# Patient Record
Sex: FEMALE | Race: ASIAN | NOT HISPANIC OR LATINO | Employment: FULL TIME | URBAN - METROPOLITAN AREA
[De-identification: names, ages, dates, MRNs, and addresses within clinical notes are randomized per-mention and may not be internally consistent; named-entity substitution may affect disease eponyms.]

---

## 2024-01-19 ENCOUNTER — APPOINTMENT (OUTPATIENT)
Dept: PRIMARY CARE | Facility: CLINIC | Age: 54
End: 2024-01-19
Payer: COMMERCIAL

## 2024-01-25 ENCOUNTER — OFFICE VISIT (OUTPATIENT)
Dept: PRIMARY CARE | Facility: CLINIC | Age: 54
End: 2024-01-25
Payer: COMMERCIAL

## 2024-01-25 ENCOUNTER — LAB (OUTPATIENT)
Dept: LAB | Facility: LAB | Age: 54
End: 2024-01-25
Payer: COMMERCIAL

## 2024-01-25 VITALS
HEIGHT: 63 IN | RESPIRATION RATE: 18 BRPM | DIASTOLIC BLOOD PRESSURE: 84 MMHG | SYSTOLIC BLOOD PRESSURE: 126 MMHG | OXYGEN SATURATION: 98 % | WEIGHT: 187.6 LBS | HEART RATE: 85 BPM | TEMPERATURE: 97.2 F | BODY MASS INDEX: 33.24 KG/M2

## 2024-01-25 DIAGNOSIS — Z01.419 WELL WOMAN EXAM: ICD-10-CM

## 2024-01-25 DIAGNOSIS — E11.65 TYPE 2 DIABETES MELLITUS WITH HYPERGLYCEMIA, WITHOUT LONG-TERM CURRENT USE OF INSULIN (MULTI): Primary | ICD-10-CM

## 2024-01-25 DIAGNOSIS — Z00.00 ANNUAL PHYSICAL EXAM: ICD-10-CM

## 2024-01-25 DIAGNOSIS — M1A.9XX0 CHRONIC GOUT INVOLVING TOE OF RIGHT FOOT WITHOUT TOPHUS, UNSPECIFIED CAUSE: ICD-10-CM

## 2024-01-25 DIAGNOSIS — Z76.89 ESTABLISHING CARE WITH NEW DOCTOR, ENCOUNTER FOR: Primary | ICD-10-CM

## 2024-01-25 DIAGNOSIS — Z12.12 SCREENING FOR COLORECTAL CANCER: ICD-10-CM

## 2024-01-25 DIAGNOSIS — E66.9 CLASS 1 OBESITY WITH BODY MASS INDEX (BMI) OF 33.0 TO 33.9 IN ADULT, UNSPECIFIED OBESITY TYPE, UNSPECIFIED WHETHER SERIOUS COMORBIDITY PRESENT: ICD-10-CM

## 2024-01-25 DIAGNOSIS — Z12.31 BREAST CANCER SCREENING BY MAMMOGRAM: ICD-10-CM

## 2024-01-25 DIAGNOSIS — Z12.11 SCREENING FOR COLORECTAL CANCER: ICD-10-CM

## 2024-01-25 DIAGNOSIS — N39.3 STRESS INCONTINENCE: ICD-10-CM

## 2024-01-25 DIAGNOSIS — E55.9 VITAMIN D DEFICIENCY: ICD-10-CM

## 2024-01-25 DIAGNOSIS — Z23 NEED FOR SHINGLES VACCINE: ICD-10-CM

## 2024-01-25 PROBLEM — J06.9 VIRAL UPPER RESPIRATORY INFECTION: Status: ACTIVE | Noted: 2024-01-25

## 2024-01-25 PROBLEM — J02.9 SORE THROAT: Status: RESOLVED | Noted: 2024-01-25 | Resolved: 2024-01-25

## 2024-01-25 PROBLEM — J06.9 VIRAL UPPER RESPIRATORY INFECTION: Status: RESOLVED | Noted: 2024-01-25 | Resolved: 2024-01-25

## 2024-01-25 PROBLEM — R05.9 COUGH: Status: ACTIVE | Noted: 2024-01-25

## 2024-01-25 PROBLEM — J02.9 SORE THROAT: Status: ACTIVE | Noted: 2024-01-25

## 2024-01-25 PROBLEM — R50.9 FEVER: Status: ACTIVE | Noted: 2024-01-25

## 2024-01-25 PROBLEM — J20.9 ACUTE BRONCHITIS: Status: RESOLVED | Noted: 2024-01-25 | Resolved: 2024-01-25

## 2024-01-25 PROBLEM — R51.9 HEAD ACHE: Status: ACTIVE | Noted: 2024-01-25

## 2024-01-25 PROBLEM — D69.6 THROMBOCYTOPENIA (CMS-HCC): Status: ACTIVE | Noted: 2024-01-25

## 2024-01-25 PROBLEM — J20.9 ACUTE BRONCHITIS: Status: ACTIVE | Noted: 2024-01-25

## 2024-01-25 PROBLEM — R03.0 ELEVATED BP WITHOUT DIAGNOSIS OF HYPERTENSION: Status: ACTIVE | Noted: 2024-01-25

## 2024-01-25 PROBLEM — R68.89: Status: ACTIVE | Noted: 2024-01-25

## 2024-01-25 PROBLEM — R03.0 ELEVATED BP WITHOUT DIAGNOSIS OF HYPERTENSION: Status: RESOLVED | Noted: 2024-01-25 | Resolved: 2024-01-25

## 2024-01-25 PROBLEM — R63.2 POLYPHAGIA: Status: ACTIVE | Noted: 2024-01-25

## 2024-01-25 PROBLEM — R05.9 COUGH: Status: RESOLVED | Noted: 2024-01-25 | Resolved: 2024-01-25

## 2024-01-25 LAB
25(OH)D3 SERPL-MCNC: 10 NG/ML (ref 30–100)
ALBUMIN SERPL BCP-MCNC: 4.5 G/DL (ref 3.4–5)
ALP SERPL-CCNC: 69 U/L (ref 33–110)
ALT SERPL W P-5'-P-CCNC: 26 U/L (ref 7–45)
ANION GAP SERPL CALC-SCNC: 17 MMOL/L (ref 10–20)
AST SERPL W P-5'-P-CCNC: 24 U/L (ref 9–39)
BASOPHILS # BLD AUTO: 0.04 X10*3/UL (ref 0–0.1)
BASOPHILS NFR BLD AUTO: 0.8 %
BILIRUB SERPL-MCNC: 0.6 MG/DL (ref 0–1.2)
BUN SERPL-MCNC: 10 MG/DL (ref 6–23)
CALCIUM SERPL-MCNC: 10 MG/DL (ref 8.6–10.6)
CHLORIDE SERPL-SCNC: 100 MMOL/L (ref 98–107)
CHOLEST SERPL-MCNC: 239 MG/DL (ref 0–199)
CHOLESTEROL/HDL RATIO: 5.5
CO2 SERPL-SCNC: 26 MMOL/L (ref 21–32)
CREAT SERPL-MCNC: 0.94 MG/DL (ref 0.5–1.05)
EGFRCR SERPLBLD CKD-EPI 2021: 72 ML/MIN/1.73M*2
EOSINOPHIL # BLD AUTO: 0.05 X10*3/UL (ref 0–0.7)
EOSINOPHIL NFR BLD AUTO: 1 %
ERYTHROCYTE [DISTWIDTH] IN BLOOD BY AUTOMATED COUNT: 12 % (ref 11.5–14.5)
EST. AVERAGE GLUCOSE BLD GHB EST-MCNC: 346 MG/DL
GLUCOSE SERPL-MCNC: 312 MG/DL (ref 74–99)
HBA1C MFR BLD: 13.7 %
HCT VFR BLD AUTO: 49.4 % (ref 36–46)
HDLC SERPL-MCNC: 43.6 MG/DL
HGB BLD-MCNC: 16.2 G/DL (ref 12–16)
IMM GRANULOCYTES # BLD AUTO: 0.02 X10*3/UL (ref 0–0.7)
IMM GRANULOCYTES NFR BLD AUTO: 0.4 % (ref 0–0.9)
LDLC SERPL CALC-MCNC: 129 MG/DL
LYMPHOCYTES # BLD AUTO: 1.12 X10*3/UL (ref 1.2–4.8)
LYMPHOCYTES NFR BLD AUTO: 22 %
MCH RBC QN AUTO: 27.6 PG (ref 26–34)
MCHC RBC AUTO-ENTMCNC: 32.8 G/DL (ref 32–36)
MCV RBC AUTO: 84 FL (ref 80–100)
MONOCYTES # BLD AUTO: 0.27 X10*3/UL (ref 0.1–1)
MONOCYTES NFR BLD AUTO: 5.3 %
NEUTROPHILS # BLD AUTO: 3.58 X10*3/UL (ref 1.2–7.7)
NEUTROPHILS NFR BLD AUTO: 70.5 %
NON HDL CHOLESTEROL: 195 MG/DL (ref 0–149)
NRBC BLD-RTO: 0 /100 WBCS (ref 0–0)
PLATELET # BLD AUTO: 242 X10*3/UL (ref 150–450)
POTASSIUM SERPL-SCNC: 4.6 MMOL/L (ref 3.5–5.3)
PROT SERPL-MCNC: 7.7 G/DL (ref 6.4–8.2)
RBC # BLD AUTO: 5.87 X10*6/UL (ref 4–5.2)
SODIUM SERPL-SCNC: 138 MMOL/L (ref 136–145)
TRIGL SERPL-MCNC: 332 MG/DL (ref 0–149)
TSH SERPL-ACNC: 2.32 MIU/L (ref 0.44–3.98)
VLDL: 66 MG/DL (ref 0–40)
WBC # BLD AUTO: 5.1 X10*3/UL (ref 4.4–11.3)

## 2024-01-25 PROCEDURE — 36415 COLL VENOUS BLD VENIPUNCTURE: CPT

## 2024-01-25 PROCEDURE — 82306 VITAMIN D 25 HYDROXY: CPT

## 2024-01-25 PROCEDURE — 99386 PREV VISIT NEW AGE 40-64: CPT | Performed by: STUDENT IN AN ORGANIZED HEALTH CARE EDUCATION/TRAINING PROGRAM

## 2024-01-25 PROCEDURE — 80061 LIPID PANEL: CPT

## 2024-01-25 PROCEDURE — 80053 COMPREHEN METABOLIC PANEL: CPT

## 2024-01-25 PROCEDURE — 3008F BODY MASS INDEX DOCD: CPT | Performed by: STUDENT IN AN ORGANIZED HEALTH CARE EDUCATION/TRAINING PROGRAM

## 2024-01-25 PROCEDURE — 84443 ASSAY THYROID STIM HORMONE: CPT

## 2024-01-25 PROCEDURE — 85025 COMPLETE CBC W/AUTO DIFF WBC: CPT

## 2024-01-25 PROCEDURE — 90750 HZV VACC RECOMBINANT IM: CPT | Performed by: STUDENT IN AN ORGANIZED HEALTH CARE EDUCATION/TRAINING PROGRAM

## 2024-01-25 PROCEDURE — 1036F TOBACCO NON-USER: CPT | Performed by: STUDENT IN AN ORGANIZED HEALTH CARE EDUCATION/TRAINING PROGRAM

## 2024-01-25 PROCEDURE — 90471 IMMUNIZATION ADMIN: CPT | Performed by: STUDENT IN AN ORGANIZED HEALTH CARE EDUCATION/TRAINING PROGRAM

## 2024-01-25 PROCEDURE — 83036 HEMOGLOBIN GLYCOSYLATED A1C: CPT

## 2024-01-25 RX ORDER — METFORMIN HYDROCHLORIDE 500 MG/1
1000 TABLET, EXTENDED RELEASE ORAL
Qty: 60 TABLET | Refills: 11 | Status: SHIPPED | OUTPATIENT
Start: 2024-01-25 | End: 2024-05-02 | Stop reason: DRUGHIGH

## 2024-01-25 RX ORDER — ERGOCALCIFEROL 1.25 MG/1
50000 CAPSULE ORAL
Qty: 4 CAPSULE | Refills: 1 | Status: SHIPPED | OUTPATIENT
Start: 2024-01-25 | End: 2024-03-21

## 2024-01-25 ASSESSMENT — LIFESTYLE VARIABLES
AUDIT-C TOTAL SCORE: 1
HOW MANY STANDARD DRINKS CONTAINING ALCOHOL DO YOU HAVE ON A TYPICAL DAY: 1 OR 2
SKIP TO QUESTIONS 9-10: 1
HOW OFTEN DO YOU HAVE A DRINK CONTAINING ALCOHOL: MONTHLY OR LESS
HOW OFTEN DO YOU HAVE SIX OR MORE DRINKS ON ONE OCCASION: NEVER

## 2024-01-25 ASSESSMENT — PATIENT HEALTH QUESTIONNAIRE - PHQ9
2. FEELING DOWN, DEPRESSED OR HOPELESS: SEVERAL DAYS
1. LITTLE INTEREST OR PLEASURE IN DOING THINGS: NOT AT ALL
10. IF YOU CHECKED OFF ANY PROBLEMS, HOW DIFFICULT HAVE THESE PROBLEMS MADE IT FOR YOU TO DO YOUR WORK, TAKE CARE OF THINGS AT HOME, OR GET ALONG WITH OTHER PEOPLE: SOMEWHAT DIFFICULT
SUM OF ALL RESPONSES TO PHQ9 QUESTIONS 1 & 2: 1

## 2024-01-25 NOTE — PATIENT INSTRUCTIONS
Due for shingles vaccine   Colonoscopy and mammogram ordered.   Blood work before your next visit.  If you receive medical information from My Chart, your results will be released into your online chart. This means you may view or see results before someone from our office contact you directly.  Please keep in mind that if blood work or imaging were ordered during your visit, all the nonurgent lab results will be discussed with you at your next office visit.  Please arrive 15 minutes before your appointment.     Please return to our nurse clinic in 2-6 months for 2nd dose of shingles vaccine   Return to office in 12 months for annual physical exam or as needed

## 2024-01-25 NOTE — PROGRESS NOTES
Subjective   Patient ID: Iva Pollock is a pleasant 53 y.o. female w hx of gout  who presents for New Patient Visit.  HPI    Health Maintenance:  -   Colonoscopy: ordered   -   Mammogram: ordered   -   PAP: due   -   Bone density DEXA: NA     Immunizations:  - COVID vaccination status: vaccinated   - Influenza: declined   - Shingles: recommended , 1st dose today.   - TDAP: recommended every 10 years   - Pneumo Vaccine: at 65    She reports she has occasional loss of bladder control with coughing and sneezing.  Denies any pelvic pain or pressure.  Denies any history of pregnancy or pelvic surgery.  Does not have hematuria, pyuria or dysuria.    Review of Systems   All other systems reviewed and are negative.      Visit Vitals  /84 (BP Location: Right arm, Patient Position: Sitting, BP Cuff Size: Adult)   Pulse 85   Temp 36.2 °C (97.2 °F)   Resp 18          Objective   Physical Exam  Constitutional:       General: She is not in acute distress.     Appearance: Normal appearance.   HENT:      Head: Normocephalic and atraumatic.   Eyes:      General: No scleral icterus.     Conjunctiva/sclera: Conjunctivae normal.   Cardiovascular:      Rate and Rhythm: Normal rate and regular rhythm.      Heart sounds: Normal heart sounds.   Pulmonary:      Effort: Pulmonary effort is normal.      Breath sounds: Normal breath sounds. No wheezing.   Abdominal:      General: Bowel sounds are normal. There is no distension.      Palpations: Abdomen is soft.      Tenderness: There is no abdominal tenderness.   Musculoskeletal:      Cervical back: Neck supple.      Right lower leg: No edema.      Left lower leg: No edema.   Lymphadenopathy:      Cervical: No cervical adenopathy.   Skin:     General: Skin is warm and dry.   Neurological:      General: No focal deficit present.      Mental Status: She is alert and oriented to person, place, and time.   Psychiatric:         Mood and Affect: Mood normal.         Behavior: Behavior  normal.         Assessment/Plan   Problem List Items Addressed This Visit    None  Visit Diagnoses       Establishing care with new doctor, encounter for    -  Primary    Annual physical exam        Class 1 obesity with body mass index (BMI) of 33.0 to 33.9 in adult, unspecified obesity type, unspecified whether serious comorbidity present        Relevant Orders    Comprehensive Metabolic Panel    CBC and Auto Differential    Vitamin D 25-Hydroxy,Total (for eval of Vitamin D levels)    Hemoglobin A1C    Lipid Panel    TSH with reflex to Free T4 if abnormal    Breast cancer screening by mammogram        Relevant Orders    BI mammo bilateral screening tomosynthesis    Screening for colorectal cancer        Relevant Orders    Colonoscopy Screening; Average Risk Patient    Chronic gout involving toe of right foot without tophus, unspecified cause        Well woman exam        Relevant Orders    Referral to Obstetrics / Gynecology    Need for shingles vaccine        Relevant Orders    Zoster vaccine, recombinant, adult (SHINGRIX) (Completed)    Stress incontinence        Relevant Orders    Referral to Physical Therapy

## 2024-02-08 ENCOUNTER — TELEMEDICINE (OUTPATIENT)
Dept: PHARMACY | Facility: HOSPITAL | Age: 54
End: 2024-02-08
Payer: COMMERCIAL

## 2024-02-08 DIAGNOSIS — E11.65 TYPE 2 DIABETES MELLITUS WITH HYPERGLYCEMIA, WITHOUT LONG-TERM CURRENT USE OF INSULIN (MULTI): ICD-10-CM

## 2024-02-08 RX ORDER — BLOOD SUGAR DIAGNOSTIC
STRIP MISCELLANEOUS
Qty: 100 STRIP | Refills: 3 | Status: SHIPPED | OUTPATIENT
Start: 2024-02-08 | End: 2024-05-02 | Stop reason: DRUGHIGH

## 2024-02-08 RX ORDER — DEXTROSE 4 G
TABLET,CHEWABLE ORAL
Qty: 1 EACH | Refills: 0 | Status: SHIPPED | OUTPATIENT
Start: 2024-02-08

## 2024-02-08 RX ORDER — LANCETS
EACH MISCELLANEOUS
Qty: 100 EACH | Refills: 3 | Status: SHIPPED | OUTPATIENT
Start: 2024-02-08 | End: 2024-05-02 | Stop reason: DRUGHIGH

## 2024-02-08 NOTE — PROGRESS NOTES
Subjective     Patient ID: Iva Pollock is a 53 y.o. female who presents for Diabetes.    Referring Provider: Rachael Avery MD     Diabetes  She presents for her initial diabetic visit. She has type 2 diabetes mellitus. Current diabetic treatment includes oral agent (monotherapy). She is compliant with treatment all of the time.       Allergies   Allergen Reactions    Sulfamethoxazole-Trimethoprim Swelling and Rash       Objective     Current DM Pharmacotherapy:   Metformin  mg; take 2 tablets by mouth daily with breakfast    SECONDARY PREVENTION  - Statin? No  - ACE-I/ARB? No  - Aspirin? No    Current monitoring regimen:   Patient is using:  patient is newly diagnosed and so she does not have a glucometer at this time.    Pertinent PMH Review:  - PMH of Pancreatitis: No  - PMH/FH of Medullary Thyroid Cancer: No  - PMH of Retinopathy: No  - PMH of Urinary Tract Infections: No    Lab Review  Lab Results   Component Value Date    BILITOT 0.6 01/25/2024    CALCIUM 10.0 01/25/2024    CO2 26 01/25/2024     01/25/2024    CREATININE 0.94 01/25/2024    GLUCOSE 312 (H) 01/25/2024    ALKPHOS 69 01/25/2024    K 4.6 01/25/2024    PROT 7.7 01/25/2024     01/25/2024    AST 24 01/25/2024    ALT 26 01/25/2024    BUN 10 01/25/2024    ANIONGAP 17 01/25/2024    ALBUMIN 4.5 01/25/2024     Lab Results   Component Value Date    TRIG 332 (H) 01/25/2024    CHOL 239 (H) 01/25/2024    LDLCALC 129 (H) 01/25/2024    HDL 43.6 01/25/2024     Lab Results   Component Value Date    HGBA1C 13.7 (H) 01/25/2024     The 10-year ASCVD risk score (Iron NIEVES, et al., 2019) is: 5.1%    Values used to calculate the score:      Age: 53 years      Sex: Female      Is Non- : No      Diabetic: Yes      Tobacco smoker: No      Systolic Blood Pressure: 126 mmHg      Is BP treated: No      HDL Cholesterol: 43.6 mg/dL      Total Cholesterol: 239 mg/dL      Assessment/Plan     Problem List Items Addressed This Visit        Type 2 diabetes mellitus with hyperglycemia, without long-term current use of insulin (CMS/[facility])     Patient's diabetes is not currently at goal. Her most recent A1c from 1/25/2024 was 13.7% which is above the goal A1c of <7%. Patient is newly diagnosed with diabetes and does not have testing supplies at this time. Given how high her A1c was on 1/25/2024, it is good for patient to see where BG is daily so will recommend her to test once a day. May also be able to recommend a GLP 1-RA or SGLT2i to help further reduce her BG based on insurance coverage and may be able to apply for PAP.    PLAN:  Continue  Metformin  mg; take 2 tablets by mouth daily with breakfast  Begin testing blood sugar daily before breakfast.         Relevant Medications    blood-glucose meter (Accu-Chek Guide Glucose Meter) misc    blood sugar diagnostic (Accu-Chek Guide test strips) strip    lancets (Accu-Chek Fastclix Lancet Drum) misc    Other Relevant Orders    Follow Up In [facility]       Type 2 diabetes mellitus, is not at goal. Goal A1C: <7%    Follow up: I recommend diabetes care be 2 weeks.    Emmie Falcon, Pharm. D.  PGY-1 Pharmacy Resident    Continue all meds under the continuation of care with the referring provider and clinical pharmacy team

## 2024-02-08 NOTE — ASSESSMENT & PLAN NOTE
Patient's diabetes is not currently at goal. Her most recent A1c from 1/25/2024 was 13.7% which is above the goal A1c of <7%. Patient is newly diagnosed with diabetes and does not have testing supplies at this time. Given how high her A1c was on 1/25/2024, it is good for patient to see where BG is daily so will recommend her to test once a day. May also be able to recommend a GLP 1-RA or SGLT2i to help further reduce her BG based on insurance coverage and may be able to apply for PAP.    PLAN:  Continue  Metformin  mg; take 2 tablets by mouth daily with breakfast  Begin testing blood sugar daily before breakfast.

## 2024-02-22 ENCOUNTER — TELEMEDICINE (OUTPATIENT)
Dept: PHARMACY | Facility: HOSPITAL | Age: 54
End: 2024-02-22
Payer: COMMERCIAL

## 2024-02-22 DIAGNOSIS — E11.65 TYPE 2 DIABETES MELLITUS WITH HYPERGLYCEMIA, WITHOUT LONG-TERM CURRENT USE OF INSULIN (MULTI): ICD-10-CM

## 2024-02-22 NOTE — PROGRESS NOTES
Subjective     Patient ID: Iva Pollock is a 53 y.o. female who presents for Diabetes.    Referring Provider: Rachael Avery MD     Diabetes  She presents for her follow-up diabetic visit. She has type 2 diabetes mellitus. Her disease course has been improving. There are no hypoglycemic associated symptoms. There are no hypoglycemic complications. There are no diabetic complications. Current diabetic treatment includes oral agent (monotherapy). She is following a generally healthy diet. She participates in exercise three times a week. Her breakfast blood glucose range is generally 130-140 mg/dl.       Allergies   Allergen Reactions    Sulfamethoxazole-Trimethoprim Swelling and Rash       Objective     Current DM Pharmacotherapy:   Metformin XR 500mg; take 2 tablets by mouth daily with breakfast    SECONDARY PREVENTION  - Statin? No  - ACE-I/ARB? No  - Aspirin? No    Current monitoring regimen:   Patient is using: glucometer    Testing frequency: daily in the mornings before breakfast    SMBG Readings: typically in the range of 130-145 mg/dL, today was 122 mg/dL    Any episodes of hypoglycemia? No  Hypoglycemia awareness? Yes      Pertinent PMH Review:  - PMH of Pancreatitis: No  - PMH/FH of Medullary Thyroid Cancer: No  - PMH of Retinopathy: No  - PMH of Urinary Tract Infections: No    Lab Review  Lab Results   Component Value Date    BILITOT 0.6 01/25/2024    CALCIUM 10.0 01/25/2024    CO2 26 01/25/2024     01/25/2024    CREATININE 0.94 01/25/2024    GLUCOSE 312 (H) 01/25/2024    ALKPHOS 69 01/25/2024    K 4.6 01/25/2024    PROT 7.7 01/25/2024     01/25/2024    AST 24 01/25/2024    ALT 26 01/25/2024    BUN 10 01/25/2024    ANIONGAP 17 01/25/2024    ALBUMIN 4.5 01/25/2024     Lab Results   Component Value Date    TRIG 332 (H) 01/25/2024    CHOL 239 (H) 01/25/2024    LDLCALC 129 (H) 01/25/2024    HDL 43.6 01/25/2024     Lab Results   Component Value Date    HGBA1C 13.7 (H) 01/25/2024     The 10-year  ASCVD risk score (Iron NIEVES, et al., 2019) is: 5.1%    Values used to calculate the score:      Age: 53 years      Sex: Female      Is Non- : No      Diabetic: Yes      Tobacco smoker: No      Systolic Blood Pressure: 126 mmHg      Is BP treated: No      HDL Cholesterol: 43.6 mg/dL      Total Cholesterol: 239 mg/dL      Assessment/Plan     Problem List Items Addressed This Visit       Type 2 diabetes mellitus with hyperglycemia, without long-term current use of insulin (CMS/[facility])     Patient's diabetes is not currently at goal. Her most recent A1c from 1/25/2024 was 13.7% which is above the goal A1c of <7%. Patient has been testing her BG once a day in the mornings before she eats. Typically her BG ranges from 130-145 mg/dL in the mornings. Will see what insurance covers for an SGLT2i or GLP-1RA to see what option would be best for patient. Patient does not want to be on a lot of medications if possible, counseled patient on the other benefits of these classes of medications including cardioprotection, renal protection, and weight loss (for GLP-1s only). Patient understood and wants to see what the insurance will cover.    PLAN  Continue  Metformin  mg; take 2 tablets by mouth daily with breakfast            Type 2 diabetes mellitus, is not at goal. Goal A1C: <7%    Follow up: I recommend diabetes care be 3 weeks.    Emmie Falcon, Pharm. D.  PGY-1 Pharmacy Resident     Continue all meds under the continuation of care with the referring provider and clinical pharmacy team

## 2024-02-22 NOTE — ASSESSMENT & PLAN NOTE
Patient's diabetes is not currently at goal. Her most recent A1c from 1/25/2024 was 13.7% which is above the goal A1c of <7%. Patient has been testing her BG once a day in the mornings before she eats. Typically her BG ranges from 130-145 mg/dL in the mornings. Will see what insurance covers for an SGLT2i or GLP-1RA to see what option would be best for patient. Patient does not want to be on a lot of medications if possible, counseled patient on the other benefits of these classes of medications including cardioprotection, renal protection, and weight loss (for GLP-1s only). Patient understood and wants to see what the insurance will cover.    PLAN  Continue  Metformin  mg; take 2 tablets by mouth daily with breakfast

## 2024-03-14 ENCOUNTER — TELEMEDICINE (OUTPATIENT)
Dept: PHARMACY | Facility: HOSPITAL | Age: 54
End: 2024-03-14
Payer: COMMERCIAL

## 2024-03-14 DIAGNOSIS — E11.65 TYPE 2 DIABETES MELLITUS WITH HYPERGLYCEMIA, WITHOUT LONG-TERM CURRENT USE OF INSULIN (MULTI): ICD-10-CM

## 2024-03-14 NOTE — ASSESSMENT & PLAN NOTE
Patient's diabetes is not currently at goal. Her most recent A1c from 1/25/2024 was 13.7% which is above the goal A1c of <7%. Patient has been continuing with the lifestyle changes and is eating a good balanced diet that includes a lot of healthy fruits and vegetables and does exercise several times a week.   Counseled patient about the beneficial effects of the SGLT2 and GLP-1 RA classes including the cardioprotective and renal protective effects in addition to the glucose lowering effects. A test claim shows  Farxiga 5 mg was $40 copay, Ozempic would need a PA. Patient states that she does not want to be on a lot of medications if she can help it and wants to think more about starting a new medication since her FBG at this time is under the goal of <130 mg/dL. Counseled patient on the adverse outcomes that are more likely with uncontrolled BG and since her A1c in January was so elevated, it is important to adequately treat her diabetes so she is not at risk for these conditions.    PLAN:  Continue  Metformin  mg; take 2 tablets by mouth daily with breakfast

## 2024-03-14 NOTE — PROGRESS NOTES
Subjective     Patient ID: Iva Pollock is a 53 y.o. female who presents for Diabetes.    Referring Provider: aRchael Avery MD     Diabetes  She presents for her follow-up diabetic visit. She has type 2 diabetes mellitus. Her breakfast blood glucose range is generally 110-130 mg/dl.       Diet: Patient is eating a lot of healthy fruits and vegetables and avoiding more carbohydrates     Exercise: Patient exercises several days a week    Allergies   Allergen Reactions    Sulfamethoxazole-Trimethoprim Swelling and Rash       Objective     Current DM Pharmacotherapy:   Metformin  mg; take 2 tablets by mouth daily with breakfast    SECONDARY PREVENTION  - Statin? No  - ACE-I/ARB? No  - Aspirin? No    Current monitoring regimen:   Patient is using: glucometer    Testing frequency: daily in the mornings before breakfast    SMBG Readings: Patient reports that her FBG has been running around the 120's in mornings     Any episodes of hypoglycemia? No  Hypoglycemia awareness? Yes      Pertinent PMH Review:  - PMH of Pancreatitis: No  - PMH/FH of Medullary Thyroid Cancer: No  - PMH/FH of Multiple Endocrine Neoplasia (MEN) Type II: No  - PMH of Retinopathy: No  - PMH of Urinary Tract Infections: No    Lab Review  Lab Results   Component Value Date    BILITOT 0.6 01/25/2024    CALCIUM 10.0 01/25/2024    CO2 26 01/25/2024     01/25/2024    CREATININE 0.94 01/25/2024    GLUCOSE 312 (H) 01/25/2024    ALKPHOS 69 01/25/2024    K 4.6 01/25/2024    PROT 7.7 01/25/2024     01/25/2024    AST 24 01/25/2024    ALT 26 01/25/2024    BUN 10 01/25/2024    ANIONGAP 17 01/25/2024    ALBUMIN 4.5 01/25/2024     Lab Results   Component Value Date    TRIG 332 (H) 01/25/2024    CHOL 239 (H) 01/25/2024    LDLCALC 129 (H) 01/25/2024    HDL 43.6 01/25/2024     Lab Results   Component Value Date    HGBA1C 13.7 (H) 01/25/2024     The 10-year ASCVD risk score (Iron NIEVES, et al., 2019) is: 5.1%    Values used to calculate the  score:      Age: 53 years      Sex: Female      Is Non- : No      Diabetic: Yes      Tobacco smoker: No      Systolic Blood Pressure: 126 mmHg      Is BP treated: No      HDL Cholesterol: 43.6 mg/dL      Total Cholesterol: 239 mg/dL      Assessment/Plan     Problem List Items Addressed This Visit       Type 2 diabetes mellitus with hyperglycemia, without long-term current use of insulin (CMS/[facility])     Patient's diabetes is not currently at goal. Her most recent A1c from 1/25/2024 was 13.7% which is above the goal A1c of <7%. Patient has been continuing with the lifestyle changes and is eating a good balanced diet that includes a lot of healthy fruits and vegetables and does exercise several times a week.   Counseled patient about the beneficial effects of the SGLT2 and GLP-1 RA classes including the cardioprotective and renal protective effects in addition to the glucose lowering effects. A test claim shows  Farxiga 5 mg was $40 copay, Ozempic would need a PA. Patient states that she does not want to be on a lot of medications if she can help it and wants to think more about starting a new medication since her FBG at this time is under the goal of <130 mg/dL. Counseled patient on the adverse outcomes that are more likely with uncontrolled BG and since her A1c in January was so elevated, it is important to adequately treat her diabetes so she is not at risk for these conditions.    PLAN:  Continue  Metformin  mg; take 2 tablets by mouth daily with breakfast            Type 2 diabetes mellitus, is not at goal. Goal A1C: <7%    Follow up: I recommend diabetes care be 3 weeks.    Emmie Falcon, Pharm. D.  PGY-1 Pharmacy Resident     Continue all meds under the continuation of care with the referring provider and clinical pharmacy team

## 2024-04-04 ENCOUNTER — TELEMEDICINE (OUTPATIENT)
Dept: PHARMACY | Facility: HOSPITAL | Age: 54
End: 2024-04-04
Payer: COMMERCIAL

## 2024-04-04 DIAGNOSIS — E11.65 TYPE 2 DIABETES MELLITUS WITH HYPERGLYCEMIA, WITHOUT LONG-TERM CURRENT USE OF INSULIN (MULTI): ICD-10-CM

## 2024-04-04 NOTE — PROGRESS NOTES
Subjective     Patient ID: Iva Pollock is a 53 y.o. female who presents for Diabetes.    Referring Provider: Rachael Avery MD     Diabetes  She presents for her follow-up diabetic visit. She has type 2 diabetes mellitus. Her disease course has been improving. Current diabetic treatment includes oral agent (monotherapy). She is compliant with treatment all of the time. She is following a generally healthy diet. She participates in exercise every other day. Her breakfast blood glucose range is generally 110-130 mg/dl. Her dinner blood glucose range is generally 140-180 mg/dl.       Diet: Patient eats a lot of healthy fruits and vegetables and is avoiding a lot of carbohydrates.    Exercise: Patient exercises every other day.    Allergies   Allergen Reactions    Sulfamethoxazole-Trimethoprim Swelling and Rash       Objective     Current DM Pharmacotherapy:   Metformin  mg; take 2 tablets by mouth daily with breakfast    SECONDARY PREVENTION  - Statin? No  - ACE-I/ARB? No  - Aspirin? No    Current monitoring regimen:   Patient is using: glucometer    Testing frequency: Daily in the mornings before breakfast.    SMBG Readings: 110's-120s in the mornings. In evenings it is around 170.    Any episodes of hypoglycemia? No  Hypoglycemia awareness? Yes      Pertinent PMH Review:  - PMH of Pancreatitis: No  - PMH/FH of Medullary Thyroid Cancer: No  - PMH/FH of Multiple Endocrine Neoplasia (MEN) Type II: No  - PMH of Retinopathy: No  - PMH of Urinary Tract Infections: No    Lab Review  Lab Results   Component Value Date    BILITOT 0.6 01/25/2024    CALCIUM 10.0 01/25/2024    CO2 26 01/25/2024     01/25/2024    CREATININE 0.94 01/25/2024    GLUCOSE 312 (H) 01/25/2024    ALKPHOS 69 01/25/2024    K 4.6 01/25/2024    PROT 7.7 01/25/2024     01/25/2024    AST 24 01/25/2024    ALT 26 01/25/2024    BUN 10 01/25/2024    ANIONGAP 17 01/25/2024    ALBUMIN 4.5 01/25/2024     Lab Results   Component Value Date     TRIG 332 (H) 01/25/2024    CHOL 239 (H) 01/25/2024    LDLCALC 129 (H) 01/25/2024    HDL 43.6 01/25/2024     Lab Results   Component Value Date    HGBA1C 13.7 (H) 01/25/2024     The 10-year ASCVD risk score (Iron NIEVES, et al., 2019) is: 5.1%    Values used to calculate the score:      Age: 53 years      Sex: Female      Is Non- : No      Diabetic: Yes      Tobacco smoker: No      Systolic Blood Pressure: 126 mmHg      Is BP treated: No      HDL Cholesterol: 43.6 mg/dL      Total Cholesterol: 239 mg/dL      Assessment/Plan     Problem List Items Addressed This Visit       Type 2 diabetes mellitus with hyperglycemia, without long-term current use of insulin (CMS/[facility])     Patient's diabetes is not currently at goal. Her most recent A1c from 1/25/2024 was 13.7% which is above the goal A1c of <7%. Patient has been continuing with the lifestyle changes and is eating a good balanced diet that includes a lot of healthy fruits and vegetables and does exercise several times a week.   Counseled patient about the beneficial effects of the SGLT2 and GLP-1 RA classes including the cardioprotective and renal protective effects in addition to the glucose lowering effects. Patient wants to see how her A1c comes back at the end of this month before starting a SGLT2, she says that she does not want to be on a lot of medications if possible.   PLAN:  Continue  Metformin  mg; take 2 tablets by mouth daily with breakfast            Type 2 diabetes mellitus, is not at goal. Goal A1C: <7%    Follow up: I recommend diabetes care be 4 weeks.    Emmie Falcon, Pharm. D.  PGY-1 Pharmacy Resident     Continue all meds under the continuation of care with the referring provider and clinical pharmacy team

## 2024-04-04 NOTE — ASSESSMENT & PLAN NOTE
Patient's diabetes is not currently at goal. Her most recent A1c from 1/25/2024 was 13.7% which is above the goal A1c of <7%. Patient has been continuing with the lifestyle changes and is eating a good balanced diet that includes a lot of healthy fruits and vegetables and does exercise several times a week.   Counseled patient about the beneficial effects of the SGLT2 and GLP-1 RA classes including the cardioprotective and renal protective effects in addition to the glucose lowering effects. Patient wants to see how her A1c comes back at the end of this month before starting a SGLT2, she says that she does not want to be on a lot of medications if possible.   PLAN:  Continue  Metformin  mg; take 2 tablets by mouth daily with breakfast

## 2024-04-16 ENCOUNTER — APPOINTMENT (OUTPATIENT)
Dept: PRIMARY CARE | Facility: CLINIC | Age: 54
End: 2024-04-16
Payer: COMMERCIAL

## 2024-04-23 ENCOUNTER — APPOINTMENT (OUTPATIENT)
Dept: PRIMARY CARE | Facility: CLINIC | Age: 54
End: 2024-04-23
Payer: COMMERCIAL

## 2024-04-23 ENCOUNTER — CLINICAL SUPPORT (OUTPATIENT)
Dept: PRIMARY CARE | Facility: CLINIC | Age: 54
End: 2024-04-23
Payer: COMMERCIAL

## 2024-04-23 DIAGNOSIS — Z23 NEED FOR SHINGLES VACCINE: ICD-10-CM

## 2024-04-23 PROCEDURE — 90750 HZV VACC RECOMBINANT IM: CPT | Performed by: STUDENT IN AN ORGANIZED HEALTH CARE EDUCATION/TRAINING PROGRAM

## 2024-04-23 PROCEDURE — 90471 IMMUNIZATION ADMIN: CPT | Performed by: STUDENT IN AN ORGANIZED HEALTH CARE EDUCATION/TRAINING PROGRAM

## 2024-04-30 ENCOUNTER — LAB (OUTPATIENT)
Dept: LAB | Facility: LAB | Age: 54
End: 2024-04-30
Payer: COMMERCIAL

## 2024-04-30 DIAGNOSIS — E11.65 TYPE 2 DIABETES MELLITUS WITH HYPERGLYCEMIA, WITHOUT LONG-TERM CURRENT USE OF INSULIN (MULTI): ICD-10-CM

## 2024-04-30 LAB
EST. AVERAGE GLUCOSE BLD GHB EST-MCNC: 169 MG/DL
HBA1C MFR BLD: 7.5 %

## 2024-04-30 PROCEDURE — 83036 HEMOGLOBIN GLYCOSYLATED A1C: CPT

## 2024-04-30 PROCEDURE — 36415 COLL VENOUS BLD VENIPUNCTURE: CPT

## 2024-05-02 ENCOUNTER — TELEMEDICINE (OUTPATIENT)
Dept: PHARMACY | Facility: HOSPITAL | Age: 54
End: 2024-05-02
Payer: COMMERCIAL

## 2024-05-02 DIAGNOSIS — E11.65 TYPE 2 DIABETES MELLITUS WITH HYPERGLYCEMIA, WITHOUT LONG-TERM CURRENT USE OF INSULIN (MULTI): Primary | ICD-10-CM

## 2024-05-02 RX ORDER — METFORMIN HYDROCHLORIDE 500 MG/1
TABLET, EXTENDED RELEASE ORAL
Qty: 90 TABLET | Refills: 11 | Status: SHIPPED | OUTPATIENT
Start: 2024-05-02

## 2024-05-02 RX ORDER — LANCETS
EACH MISCELLANEOUS
Qty: 200 EACH | Refills: 1 | Status: SHIPPED | OUTPATIENT
Start: 2024-05-02

## 2024-05-02 RX ORDER — BLOOD SUGAR DIAGNOSTIC
1 STRIP MISCELLANEOUS 2 TIMES DAILY
Qty: 200 STRIP | Refills: 3 | Status: SHIPPED | OUTPATIENT
Start: 2024-05-02

## 2024-05-02 NOTE — PROGRESS NOTES
Subjective     Patient ID: Iva Pollock is a 53 y.o. female who presents for Diabetes.    Referring Provider: Rachael Avery MD     Diabetes  She presents for her follow-up diabetic visit. She has type 2 diabetes mellitus. Her disease course has been improving. Current diabetic treatment includes diet and oral agent (monotherapy). She is compliant with treatment all of the time. She is following a generally healthy diet. She participates in exercise every other day. Her home blood glucose trend is decreasing steadily. Her breakfast blood glucose range is generally 110-130 mg/dl.       Diet: Patient continuing her diet of heathy fruits and vegetables and tries to avoid most carbohydrates.    Exercise: Patient exercises every other day    Allergies   Allergen Reactions    Sulfamethoxazole-Trimethoprim Swelling and Rash       Objective     Current DM Pharmacotherapy:   Metformin  mg; take 2 tablets by mouth daily with breakfast    SECONDARY PREVENTION  - Statin? No  - ACE-I/ARB? No  - Aspirin? No    Current monitoring regimen:   Patient is using: glucometer    Testing frequency: Daily in the mornings before breakfast    SMBG Readings: Blood sugar is typically in the 110's-120's in the mornings    Any episodes of hypoglycemia? No  Hypoglycemia awareness? Yes      Pertinent PMH Review:  - PMH of Pancreatitis: No  - PMH/FH of Medullary Thyroid Cancer: No  - PMH/FH of Multiple Endocrine Neoplasia (MEN) Type II: No  - PMH of Retinopathy: No  - PMH of Urinary Tract Infections/Yeast Infections: No    Lab Review  Lab Results   Component Value Date    BILITOT 0.6 01/25/2024    CALCIUM 10.0 01/25/2024    CO2 26 01/25/2024     01/25/2024    CREATININE 0.94 01/25/2024    GLUCOSE 312 (H) 01/25/2024    ALKPHOS 69 01/25/2024    K 4.6 01/25/2024    PROT 7.7 01/25/2024     01/25/2024    AST 24 01/25/2024    ALT 26 01/25/2024    BUN 10 01/25/2024    ANIONGAP 17 01/25/2024    ALBUMIN 4.5 01/25/2024     Lab Results    Component Value Date    TRIG 332 (H) 01/25/2024    CHOL 239 (H) 01/25/2024    LDLCALC 129 (H) 01/25/2024    HDL 43.6 01/25/2024     Lab Results   Component Value Date    HGBA1C 7.5 (H) 04/30/2024     The 10-year ASCVD risk score (Iron NIEVES, et al., 2019) is: 5.1%    Values used to calculate the score:      Age: 53 years      Sex: Female      Is Non- : No      Diabetic: Yes      Tobacco smoker: No      Systolic Blood Pressure: 126 mmHg      Is BP treated: No      HDL Cholesterol: 43.6 mg/dL      Total Cholesterol: 239 mg/dL      Assessment/Plan     Problem List Items Addressed This Visit       Type 2 diabetes mellitus with hyperglycemia, without long-term current use of insulin (Multi) - Primary     Patient's diabetes is not currently at goal. Her most recent A1c from 4/30/2024 was much improved from previous values at 7.5%, but is still over the goal A1c of <7%.   Counseled patient about the beneficial effects of the SGLT2 and GLP-1 RA classes including the cardioprotective and renal protective effects in addition to the glucose lowering effects. Patient would rather increase the dose of metformin than start a new medication at this time.   Patient wants to try testing BG twice a day to see where it is in the afternoon/evening after she eats. Patient is getting ready to travel out of the country for a month to visit family. She states that she has enough of the metformin for her trip, but wants to get a new prescription for test strips/ lancets to test twice a day before she leaves.    PLAN:  Change:  Metformin  mg; take 2 tablets (1000 mg) in the morning and 1 tablet (500 mg) in the evening with meals.   Begin testing BG twice a day, in the mornings before breakfast and 2 hours after the largest meal of the day         Relevant Medications    metFORMIN XR (Glucophage-XR) 500 mg 24 hr tablet    blood sugar diagnostic (Accu-Chek Guide test strips) strip    lancets (Accu-Chek Fastclix  Lancet Drum) misc    Other Relevant Orders    Follow Up In [facility] Pharmacy       Type 2 diabetes mellitus, is not at goal. Goal A1C: <7%    Follow up: I recommend diabetes care be 7 weeks.    Emmie Falcon, Pharm. D.  PGY-1 Pharmacy Resident    Continue all meds under the continuation of care with the referring provider and clinical pharmacy team

## 2024-05-02 NOTE — ASSESSMENT & PLAN NOTE
Patient's diabetes is not currently at goal. Her most recent A1c from 4/30/2024 was much improved from previous values at 7.5%, but is still over the goal A1c of <7%.   Counseled patient about the beneficial effects of the SGLT2 and GLP-1 RA classes including the cardioprotective and renal protective effects in addition to the glucose lowering effects. Patient would rather increase the dose of metformin than start a new medication at this time.   Patient wants to try testing BG twice a day to see where it is in the afternoon/evening after she eats. Patient is getting ready to travel out of the country for a month to visit family. She states that she has enough of the metformin for her trip, but wants to get a new prescription for test strips/ lancets to test twice a day before she leaves.    PLAN:  Change:  Metformin  mg; take 2 tablets (1000 mg) in the morning and 1 tablet (500 mg) in the evening with meals.   Begin testing BG twice a day, in the mornings before breakfast and 2 hours after the largest meal of the day

## 2024-06-13 ENCOUNTER — HOSPITAL ENCOUNTER (OUTPATIENT)
Dept: GASTROENTEROLOGY | Facility: HOSPITAL | Age: 54
Setting detail: OUTPATIENT SURGERY
Discharge: HOME | End: 2024-06-13
Payer: COMMERCIAL

## 2024-06-13 VITALS
SYSTOLIC BLOOD PRESSURE: 167 MMHG | HEIGHT: 63 IN | TEMPERATURE: 96.8 F | BODY MASS INDEX: 34.38 KG/M2 | RESPIRATION RATE: 16 BRPM | DIASTOLIC BLOOD PRESSURE: 98 MMHG | HEART RATE: 61 BPM | OXYGEN SATURATION: 98 % | WEIGHT: 194 LBS

## 2024-06-13 DIAGNOSIS — Z12.12 SCREENING FOR COLORECTAL CANCER: ICD-10-CM

## 2024-06-13 DIAGNOSIS — Z12.11 SCREENING FOR COLORECTAL CANCER: ICD-10-CM

## 2024-06-13 LAB
GLUCOSE BLD MANUAL STRIP-MCNC: 103 MG/DL (ref 74–99)
GLUCOSE BLD MANUAL STRIP-MCNC: 108 MG/DL (ref 74–99)

## 2024-06-13 PROCEDURE — 3700000013 HC SEDATION LEVEL 5+ TIME - EACH ADDITIONAL 15 MINUTES

## 2024-06-13 PROCEDURE — 45380 COLONOSCOPY AND BIOPSY: CPT | Performed by: INTERNAL MEDICINE

## 2024-06-13 PROCEDURE — 99153 MOD SED SAME PHYS/QHP EA: CPT | Performed by: INTERNAL MEDICINE

## 2024-06-13 PROCEDURE — 2500000004 HC RX 250 GENERAL PHARMACY W/ HCPCS (ALT 636 FOR OP/ED): Performed by: INTERNAL MEDICINE

## 2024-06-13 PROCEDURE — 7100000010 HC PHASE TWO TIME - EACH INCREMENTAL 1 MINUTE

## 2024-06-13 PROCEDURE — 82947 ASSAY GLUCOSE BLOOD QUANT: CPT

## 2024-06-13 PROCEDURE — 99152 MOD SED SAME PHYS/QHP 5/>YRS: CPT | Performed by: INTERNAL MEDICINE

## 2024-06-13 PROCEDURE — 7100000009 HC PHASE TWO TIME - INITIAL BASE CHARGE

## 2024-06-13 PROCEDURE — 3700000012 HC SEDATION LEVEL 5+ TIME - INITIAL 15 MINUTES 5/> YEARS

## 2024-06-13 RX ORDER — MIDAZOLAM HYDROCHLORIDE 1 MG/ML
INJECTION, SOLUTION INTRAMUSCULAR; INTRAVENOUS AS NEEDED
Status: COMPLETED | OUTPATIENT
Start: 2024-06-13 | End: 2024-06-13

## 2024-06-13 RX ORDER — MEPERIDINE HYDROCHLORIDE 25 MG/ML
INJECTION INTRAMUSCULAR; INTRAVENOUS; SUBCUTANEOUS AS NEEDED
Status: COMPLETED | OUTPATIENT
Start: 2024-06-13 | End: 2024-06-13

## 2024-06-13 ASSESSMENT — PATIENT HEALTH QUESTIONNAIRE - PHQ9
2. FEELING DOWN, DEPRESSED OR HOPELESS: NOT AT ALL
SUM OF ALL RESPONSES TO PHQ9 QUESTIONS 1 AND 2: 0
1. LITTLE INTEREST OR PLEASURE IN DOING THINGS: NOT AT ALL

## 2024-06-13 ASSESSMENT — PAIN - FUNCTIONAL ASSESSMENT
PAIN_FUNCTIONAL_ASSESSMENT: 0-10
PAIN_FUNCTIONAL_ASSESSMENT: 0-10

## 2024-06-13 ASSESSMENT — PAIN SCALES - GENERAL
PAINLEVEL_OUTOF10: 0 - NO PAIN

## 2024-06-13 ASSESSMENT — COLUMBIA-SUICIDE SEVERITY RATING SCALE - C-SSRS
2. HAVE YOU ACTUALLY HAD ANY THOUGHTS OF KILLING YOURSELF?: NO
1. IN THE PAST MONTH, HAVE YOU WISHED YOU WERE DEAD OR WISHED YOU COULD GO TO SLEEP AND NOT WAKE UP?: NO
6. HAVE YOU EVER DONE ANYTHING, STARTED TO DO ANYTHING, OR PREPARED TO DO ANYTHING TO END YOUR LIFE?: NO

## 2024-06-13 NOTE — H&P
History Of Present Illness  Iva Pollock is a 53 y.o. female presenting with screening.     Past Medical History  Past Medical History:   Diagnosis Date    Diabetes mellitus, type 2 (Multi)     Gout, unspecified 07/26/2014    Acute gout    Other conditions influencing health status 12/26/2013    Influenza Type A    Other general symptoms and signs 06/04/2019    Suspected dengue fever    Pain in left shoulder 09/19/2016    Left shoulder pain    Pain in right foot 06/12/2016    Right foot pain    Pain in right hip 09/19/2016    Right hip pain    Pain in unspecified foot 07/26/2014    Foot pain    Pneumonia, unspecified organism 03/13/2016    Community acquired pneumonia    Thrombocytopenia (CMS-HCC)      Surgical History  Past Surgical History:   Procedure Laterality Date    APPENDECTOMY  09/19/2016    Appendectomy    CHOLECYSTECTOMY       Social History  She reports that she has never smoked. She has never used smokeless tobacco. She reports that she does not currently use alcohol. She reports that she does not use drugs.    Family History  Family History   Problem Relation Name Age of Onset    Diabetes Mother      Diabetes Maternal Grandmother      Diabetes Maternal Grandfather      Diabetes Paternal Grandmother      Diabetes Paternal Grandfather          Allergies  Allergies   Allergen Reactions    Sulfa (Sulfonamide Antibiotics) Rash    Sulfamethoxazole-Trimethoprim Rash and Swelling     Review of Systems  Pre-sedation Evaluation:  ASA Classification - ASA 2 - Patient with mild systemic disease with no functional limitations  Mallampati Score - II (hard and soft palate, upper portion of tonsils and uvula visible)    Physical Exam  Constitutional:       Appearance: Normal appearance.   Cardiovascular:      Rate and Rhythm: Normal rate and regular rhythm.   Pulmonary:      Effort: Pulmonary effort is normal.      Breath sounds: Normal breath sounds.   Neurological:      Mental Status: She is alert.          Last  "Recorded Vitals  Blood pressure (!) 169/102, pulse 71, temperature 36.2 °C (97.2 °F), temperature source Temporal, resp. rate 18, height 1.6 m (5' 3\"), weight 88 kg (194 lb 0.1 oz), SpO2 97%.     Assessment/Plan   R/O neoplasm for colonoscopy     PTA/Current Medications:  (Not in a hospital admission)    Current Outpatient Medications   Medication Sig Dispense Refill    metFORMIN XR (Glucophage-XR) 500 mg 24 hr tablet Take 2 tablets by mouth in the morning and 1 tablet by mouth in the evening with meals. Do not crush, chew, or split. 90 tablet 11    blood sugar diagnostic (Accu-Chek Guide test strips) strip 1 strip 2 times a day. 200 strip 3    blood-glucose meter (Accu-Chek Guide Glucose Meter) misc Use as directed to test blood sugar daily 1 each 0    lancets (Accu-Chek Fastclix Lancet Drum) misc Use 1 lancet to check blood sugar twice a day as directed. 200 each 1     No current facility-administered medications for this encounter.     Frandy Kenyon MD  "

## 2024-06-13 NOTE — DISCHARGE INSTRUCTIONS
Patient Instructions after a Colonoscopy      The anesthetics, sedatives or narcotics which were given to you today will be acting in your body for the next 24 hours, so you might feel a little sleepy or groggy.  This feeling should slowly wear off. Carefully read and follow the instructions.     You received sedation today:  - Do not drive or operate any machinery or power tools of any kind.   - No alcoholic beverages today, not even beer or wine.  - Do not make any important decisions or sign any legal documents.  - No over the counter medications that contain alcohol or that may cause drowsiness.  - Do not make any important decisions or sign any legal documents.  - Make sure you have someone with you for first 24 hours.    While it is common to experience mild to moderate abdominal distention, gas, or belching after your procedure, if any of these symptoms occur following discharge from the GI Lab or within one week of having your procedure, call the [facility] to be advised whether a visit to your nearest Urgent Care or Emergency Department is indicated.  Take this paper with you if you go.     - If you develop an allergic reaction to the medications that were given during your procedure such as difficulty breathing, rash, hives, severe nausea, vomiting or lightheadedness.  - If you experience chest pain, shortness of breath, severe abdominal pain, fevers and chills.  -If you develop signs and symptoms of bleeding such as blood in your spit, if your stools turn black, tarry, or bloody  - If you have not urinated within 8 hours following your procedure.  - If your IV site becomes painful, red, inflamed, or looks infected.    If you received a biopsy/polypectomy/sphincterotomy the following instructions apply below:    __ Do not use Aspirin containing products, non-steroidal medications or anti-coagulants for one week following your procedure. (Examples of these types of medications are: Advil,  Arthrotec, Aleve, Coumadin, Ecotrin, Heparin, Ibuprofen, Indocin, Motrin, Naprosyn, Nuprin, Plavix, Vioxx, and Voltarin, or their generic forms.  This list is not all-inclusive.  Check with your physician or pharmacist before resuming medications.)   __ Eat a soft diet today.  Avoid foods that are poorly digested for the next 24 hours.  These foods would include: nuts, beans, lettuce, red meats, and fried foods. Start with liquids and advance your diet as tolerated, gradually work up to eating solids.   __ Do not have a Barium Study or Enema for one week.    Your physician recommends the additional following instructions:    -You have a contact number available for emergencies. The signs and symptoms of potential delayed complications were discussed with you. You may return to normal activities tomorrow.  -Resume your previous diet.  -Continue your present medications.   -We are waiting for your pathology results.  -Your physician has recommended a repeat colonoscopy (date to be determined after pending pathology results are reviewed) for surveillance based on pathology results.  -The findings and recommendations have been discussed with you.  -The findings and recommendations were discussed with your family.  - Please see Medication Reconciliation Form for new medication/medications prescribed.       If you experience any problems or have any questions following discharge from the GI Lab, please call:    Frandy Kenyon MD  [phone]      Nurse Signature                                                                        Date___________________                                                                            Patient/Responsible Party Signature                                        Date___________________

## 2024-06-14 NOTE — ADDENDUM NOTE
Encounter addended by: Shannan Diana RN on: 6/14/2024 10:24 AM   Actions taken: Contacts section saved, Flowsheet accepted

## 2024-06-20 ENCOUNTER — APPOINTMENT (OUTPATIENT)
Dept: PHARMACY | Facility: HOSPITAL | Age: 54
End: 2024-06-20
Payer: COMMERCIAL

## 2024-06-20 DIAGNOSIS — E11.65 TYPE 2 DIABETES MELLITUS WITH HYPERGLYCEMIA, WITHOUT LONG-TERM CURRENT USE OF INSULIN (MULTI): Primary | ICD-10-CM

## 2024-06-20 NOTE — ASSESSMENT & PLAN NOTE
Patient's diabetes is not currently at goal. Her most recent A1c from 4/30/2024 was 7.5%, which is over the goal A1c of <7%. Patient states that her morning FBG has been around 120 mg/dL every day recently. She still had not began to test twice a day because she usually falls asleep in the evening before the 2 hours after her meal. She states that typically her lunch is a larger meal than dinner, counseled patient to test BG 2 hours after lunch.     PLAN:  Continue  Metformin  mg; take 2 tablets (1000 mg) by mouth in the mornings and 1 tablet (500 mg) by mouth in the evenings with meals    Begin to test blood sugar twice a day.

## 2024-06-20 NOTE — PROGRESS NOTES
Subjective     Patient ID: Iva Pollock is a 53 y.o. female who presents for Diabetes.    Referring Provider: Rachael Avery MD     Diabetes  She presents for her follow-up diabetic visit. She has type 2 diabetes mellitus. Current diabetic treatment includes oral agent (monotherapy). She is compliant with treatment all of the time. She is following a generally healthy diet. She participates in exercise three times a week. Her breakfast blood glucose range is generally 110-130 mg/dl.       Diet: Patient eats a lot of healthy fruits and vegetables and typically tries to avoid most carbohydrates. While she was out of the country visiting family, patient reports that she did eat more food, and more carbohydrates specifically than she normally does and she is getting back to her regular diet now that she is back home.     Exercise: Patient hikes in the evening 3 days a week.     Allergies   Allergen Reactions    Sulfa (Sulfonamide Antibiotics) Rash    Sulfamethoxazole-Trimethoprim Rash and Swelling       Objective     Current DM Pharmacotherapy:   Metformin  mg; take 2 tablets (1000 mg) by mouth in the morning and 1 tablet (500 mg) in the evening with meals    SECONDARY PREVENTION  - Statin? No  - ACE-I/ARB? No  - Aspirin? No    Current monitoring regimen:   Patient is using: glucometer    Testing frequency: Daily in the mornings before breakfast     SMBG Readings: 120 mg/dL on average    Any episodes of hypoglycemia? No  Hypoglycemia awareness? Yes      Pertinent PMH Review:  - PMH of Pancreatitis: No  - PMH/FH of Medullary Thyroid Cancer: No  - PMH/FH of Multiple Endocrine Neoplasia (MEN) Type II: No  - PMH of Retinopathy: No  - PMH of Urinary Tract Infections/Yeast Infections: No    Lab Review  Lab Results   Component Value Date    BILITOT 0.6 01/25/2024    CALCIUM 10.0 01/25/2024    CO2 26 01/25/2024     01/25/2024    CREATININE 0.94 01/25/2024    GLUCOSE 312 (H) 01/25/2024    ALKPHOS 69 01/25/2024     K 4.6 01/25/2024    PROT 7.7 01/25/2024     01/25/2024    AST 24 01/25/2024    ALT 26 01/25/2024    BUN 10 01/25/2024    ANIONGAP 17 01/25/2024    ALBUMIN 4.5 01/25/2024     Lab Results   Component Value Date    TRIG 332 (H) 01/25/2024    CHOL 239 (H) 01/25/2024    LDLCALC 129 (H) 01/25/2024    HDL 43.6 01/25/2024     Lab Results   Component Value Date    HGBA1C 7.5 (H) 04/30/2024     The 10-year ASCVD risk score (Iron NIEVES, et al., 2019) is: 8.8%    Values used to calculate the score:      Age: 53 years      Sex: Female      Is Non- : No      Diabetic: Yes      Tobacco smoker: No      Systolic Blood Pressure: 167 mmHg      Is BP treated: No      HDL Cholesterol: 43.6 mg/dL      Total Cholesterol: 239 mg/dL      Assessment/Plan     Problem List Items Addressed This Visit       Type 2 diabetes mellitus with hyperglycemia, without long-term current use of insulin (Multi) - Primary     Patient's diabetes is not currently at goal. Her most recent A1c from 4/30/2024 was 7.5%, which is over the goal A1c of <7%. Patient states that her morning FBG has been around 120 mg/dL every day recently. She still had not began to test twice a day because she usually falls asleep in the evening before the 2 hours after her meal. She states that typically her lunch is a larger meal than dinner, counseled patient to test BG 2 hours after lunch.     PLAN:  Continue  Metformin  mg; take 2 tablets (1000 mg) by mouth in the mornings and 1 tablet (500 mg) by mouth in the evenings with meals    Begin to test blood sugar twice a day.             Type 2 diabetes mellitus, is not at goal. Goal A1C: <7%    Follow up: I recommend diabetes care be 5 weeks.    Emmie Falcon, Pharm. D.  PGY-1 Pharmacy Resident    Continue all meds under the continuation of care with the referring provider and clinical pharmacy team

## 2024-06-27 LAB
LABORATORY COMMENT REPORT: NORMAL
PATH REPORT.FINAL DX SPEC: NORMAL
PATH REPORT.GROSS SPEC: NORMAL
PATH REPORT.TOTAL CANCER: NORMAL

## 2024-07-25 ENCOUNTER — APPOINTMENT (OUTPATIENT)
Dept: PHARMACY | Facility: HOSPITAL | Age: 54
End: 2024-07-25
Payer: COMMERCIAL

## 2024-07-25 DIAGNOSIS — E11.65 TYPE 2 DIABETES MELLITUS WITH HYPERGLYCEMIA, WITHOUT LONG-TERM CURRENT USE OF INSULIN (MULTI): ICD-10-CM

## 2024-07-25 NOTE — ASSESSMENT & PLAN NOTE
Patient's goal A1c is < 7%.  Is pt at goal? No, most recent A1c from 4/30/2024 was 7.5%  Patient's SMBGs are at goal. FBG have recently been in the low 100s, averaging 102-106 mg/dL and 2 hours after lunch, her postprandial BG averages 135-140 mg/dL.     Rationale for plan: Continue current therapy, patient SMBG are at goal.    Medication Changes:  CONTINUE:  Metformin  mg; take 2 tablets (1000 mg) by mouth in the mornings    Future Considerations:  Patient has appointment with new PCP on 8/28/2024. Will need to get new A1c at that time. Patient wants to be able to get off the medication at some point depending on her A1c.     Monitoring and Education:  Counseled patient on getting new blood work done with her new PCP  Counseled patient on MOA, dosing, adverse effects of medications  Answered all patient questions/concerns

## 2024-07-25 NOTE — PROGRESS NOTES
Clinical Pharmacy Appointment    Patient ID: Iva Pollock is a 53 y.o. female who presents for Diabetes.    Pt is here for Follow Up appointment.     Referring Provider: Rachael Avery MD  PCP: No primary care provider on file.   Last visit with PCP: 1/25/2024   Next visit with PCP: Dr. Avery left , patient needs a new PCP. Has appointment on 8/28/2024 with Nancy Belcher MD      Subjective   HPI  DIABETES MELLITUS TYPE 2:   Current diabetic medications include:  Metformin  mg; take 2 tablets (1000 mg) by mouth in the mornings     Glucose Readings:  Glucometer/CGM Type: Accu-Check Glucometer  Patient tests BG 2 times per day    Current home BG readings: in the morning 102-106 mg/dL, 2 hours after lunch 135-140 mg/dL   Previous home BG readings: Average 120 mg/dL    Any episodes of hypoglycemia? N/A.  Did patient treat episode of hypoglycemia appropriately? N/A  Does the patient have a prescription for ready-to-use Glucagon? Not on insulin    Lifestyle:  Diet: 2 meals/day. Patient eats a lot of healthy fruits and vegetables and tries to avoid most carbs, most days does not eat dinner, typically stops eating after 5 pm.     Physical Activity: Hikes in the evenings 3 days a week.     Secondary Prevention:  Statin? No  ACE-I/ARB? No  Aspirin? No    Pertinent PMH Review:  PMH of Pancreatitis: No  PMH of Retinopathy: No  PMH of Urinary Tract Infections: No  PMH of MTC: No    Medication Reconciliation:  Changed:   Added:   Discontinued:     Drug Interactions  No relevant drug interactions were noted.    Medication System Management  Patients preferred pharmacy: [facility] in [place], [place]  Adherence/Organization: takes the medication as prescribed  Affordability/Accessibility: no concern with cost for metformin      Objective   Allergies   Allergen Reactions    Sulfa (Sulfonamide Antibiotics) Rash    Sulfamethoxazole-Trimethoprim Rash and Swelling     Social History     Social History  "Narrative    Not on file      Medication Review  Current Outpatient Medications   Medication Instructions    blood sugar diagnostic (Accu-Chek Guide test strips) strip 1 strip, miscellaneous, 2 times daily    blood-glucose meter (Accu-Chek Guide Glucose Meter) misc Use as directed to test blood sugar daily    lancets (Accu-Chek Fastclix Lancet Drum) misc Use 1 lancet to check blood sugar twice a day as directed.    metFORMIN XR (Glucophage-XR) 500 mg 24 hr tablet Take 2 tablets by mouth in the morning and 1 tablet by mouth in the evening with meals. Do not crush, chew, or split.      Vitals  BP Readings from Last 2 Encounters:   06/13/24 (!) 167/98   01/25/24 126/84     BMI Readings from Last 1 Encounters:   06/13/24 34.37 kg/m²      Labs  A1C  Lab Results   Component Value Date    HGBA1C 7.5 (H) 04/30/2024    HGBA1C 13.7 (H) 01/25/2024     BMP  Lab Results   Component Value Date    CALCIUM 10.0 01/25/2024     01/25/2024    K 4.6 01/25/2024    CO2 26 01/25/2024     01/25/2024    BUN 10 01/25/2024    CREATININE 0.94 01/25/2024    EGFR 72 01/25/2024     LFTs  Lab Results   Component Value Date    ALT 26 01/25/2024    AST 24 01/25/2024    ALKPHOS 69 01/25/2024    BILITOT 0.6 01/25/2024     FLP  Lab Results   Component Value Date    TRIG 332 (H) 01/25/2024    CHOL 239 (H) 01/25/2024    LDLF 148 (H) 11/13/2019    LDLCALC 129 (H) 01/25/2024    HDL 43.6 01/25/2024     Urine Microalbumin  No results found for: \"MICROALBCREA\"  Weight Management  Wt Readings from Last 3 Encounters:   06/13/24 88 kg (194 lb 0.1 oz)   01/25/24 85.1 kg (187 lb 9.6 oz)   06/01/19 81.6 kg (179 lb 14.3 oz)      There is no height or weight on file to calculate BMI.     Assessment/Plan   Problem List Items Addressed This Visit       Type 2 diabetes mellitus with hyperglycemia, without long-term current use of insulin (Multi)     Patient's goal A1c is < 7%.  Is pt at goal? No, most recent A1c from 4/30/2024 was 7.5%  Patient's SMBGs are " at goal. FBG have recently been in the low 100s, averaging 102-106 mg/dL and 2 hours after lunch, her postprandial BG averages 135-140 mg/dL.     Rationale for plan: Continue current therapy, patient SMBG are at goal.    Medication Changes:  CONTINUE:  Metformin  mg; take 2 tablets (1000 mg) by mouth in the mornings    Future Considerations:  Patient has appointment with new PCP on 8/28/2024. Will need to get new A1c at that time. Patient wants to be able to get off the medication at some point depending on her A1c.     Monitoring and Education:  Counseled patient on getting new blood work done with her new PCP  Counseled patient on MOA, dosing, adverse effects of medications  Answered all patient questions/concerns            Clinical Pharmacist follow-up: if needed, Telehealth visit    Continue all meds under the continuation of care with the referring provider and clinical pharmacy team.    Thank you,  Emmie Falcon, PharmD  Clinical Pharmacist - Primary Care  [phone]  7/25/2024    Verbal consent to manage patient's drug therapy was obtained from the patient. They were informed they may decline to participate or withdraw from participation in pharmacy services at any time.

## 2024-08-28 ENCOUNTER — APPOINTMENT (OUTPATIENT)
Dept: PRIMARY CARE | Facility: CLINIC | Age: 54
End: 2024-08-28
Payer: COMMERCIAL

## 2024-08-28 VITALS
SYSTOLIC BLOOD PRESSURE: 164 MMHG | BODY MASS INDEX: 32.87 KG/M2 | HEART RATE: 74 BPM | HEIGHT: 63 IN | DIASTOLIC BLOOD PRESSURE: 99 MMHG | WEIGHT: 185.5 LBS | OXYGEN SATURATION: 98 %

## 2024-08-28 DIAGNOSIS — E66.9 OBESITY (BMI 30-39.9): ICD-10-CM

## 2024-08-28 DIAGNOSIS — E11.65 TYPE 2 DIABETES MELLITUS WITH HYPERGLYCEMIA, WITHOUT LONG-TERM CURRENT USE OF INSULIN (MULTI): ICD-10-CM

## 2024-08-28 DIAGNOSIS — I10 PRIMARY HYPERTENSION: Primary | ICD-10-CM

## 2024-08-28 DIAGNOSIS — Z76.89 ESTABLISHING CARE WITH NEW DOCTOR, ENCOUNTER FOR: ICD-10-CM

## 2024-08-28 LAB — HBA1C MFR BLD: 6.4 % (ref 4.2–6.5)

## 2024-08-28 PROCEDURE — 3080F DIAST BP >= 90 MM HG: CPT | Performed by: FAMILY MEDICINE

## 2024-08-28 PROCEDURE — 4010F ACE/ARB THERAPY RXD/TAKEN: CPT | Performed by: FAMILY MEDICINE

## 2024-08-28 PROCEDURE — 3049F LDL-C 100-129 MG/DL: CPT | Performed by: FAMILY MEDICINE

## 2024-08-28 PROCEDURE — 83036 HEMOGLOBIN GLYCOSYLATED A1C: CPT | Mod: CLIA WAIVED TEST | Performed by: FAMILY MEDICINE

## 2024-08-28 PROCEDURE — 3008F BODY MASS INDEX DOCD: CPT | Performed by: FAMILY MEDICINE

## 2024-08-28 PROCEDURE — 99214 OFFICE O/P EST MOD 30 MIN: CPT | Performed by: FAMILY MEDICINE

## 2024-08-28 PROCEDURE — 3044F HG A1C LEVEL LT 7.0%: CPT | Performed by: FAMILY MEDICINE

## 2024-08-28 PROCEDURE — 1036F TOBACCO NON-USER: CPT | Performed by: FAMILY MEDICINE

## 2024-08-28 PROCEDURE — 3077F SYST BP >= 140 MM HG: CPT | Performed by: FAMILY MEDICINE

## 2024-08-28 RX ORDER — LOSARTAN POTASSIUM 50 MG/1
50 TABLET ORAL DAILY
Qty: 30 TABLET | Refills: 11 | Status: SHIPPED | OUTPATIENT
Start: 2024-08-28 | End: 2025-08-28

## 2024-08-28 ASSESSMENT — ENCOUNTER SYMPTOMS
DEPRESSION: 0
LOSS OF SENSATION IN FEET: 0
OCCASIONAL FEELINGS OF UNSTEADINESS: 0

## 2024-08-28 ASSESSMENT — PATIENT HEALTH QUESTIONNAIRE - PHQ9
1. LITTLE INTEREST OR PLEASURE IN DOING THINGS: NOT AT ALL
2. FEELING DOWN, DEPRESSED OR HOPELESS: NOT AT ALL
SUM OF ALL RESPONSES TO PHQ9 QUESTIONS 1 AND 2: 0

## 2024-08-28 ASSESSMENT — COLUMBIA-SUICIDE SEVERITY RATING SCALE - C-SSRS
6. HAVE YOU EVER DONE ANYTHING, STARTED TO DO ANYTHING, OR PREPARED TO DO ANYTHING TO END YOUR LIFE?: NO
2. HAVE YOU ACTUALLY HAD ANY THOUGHTS OF KILLING YOURSELF?: NO
1. IN THE PAST MONTH, HAVE YOU WISHED YOU WERE DEAD OR WISHED YOU COULD GO TO SLEEP AND NOT WAKE UP?: NO

## 2024-08-28 NOTE — PROGRESS NOTES
This is a 54-year-old female patient who is seeing me for the first time    She says she used to see your doctor who is retired    And has not been having regular follow-up    She was noted to have diabetes not under control and today her A1c is much better than what it was in December    She works as a  and she goes through a lot of stress at work from September to December and during that time she has been neglecting her health and her A1c jensen up to 11    Currently she is taking metformin  mg 2 tablets a day    She is  not having any children    She goes to the gym with her  twice a week which she enjoys very much    Also during summer she has been going on hikes from about 630 till 730 which she will not be able to continue when the weather changes    We spoke at length about lifestyle changes healthy living    She is doing remarkably well but since her blood pressure is high we made this shared decision to start on on losartan 50 mg    Advised patient to come back for her annual  PE

## 2024-08-28 NOTE — PATIENT INSTRUCTIONS
1.  Every day sleep  at night or rest ( some days we are unable to sleep )around the same time without the TV-this is important habit to reduce dementia and aging prematurely    2.  Eat 3 cups of green leafy vegetables daily include at least 1 fruit.,  Reduce animal protein consumption-/ also  Starch  consumption  --this will help to lose weight avoid illnesses such as dementia high blood pressure cancer.,  Diabetes    3.  Exercise including aerobics as well as resistant exercise for at least 45 minutes a day for 5 days this will also help to reduce premature aging       4.DEEP BREATHING EXERCISE___ BLOW UP YOUR HELIUM BALLOON       5.  You have been enjoying your hike during the summer from 6:30 PM until about 8:30 PM    Lack of that hike is contributing to the stress you are going through from September until December so therefore I wanted to replace your hiking it may not be at that time but you got to find a different time may be in the middle of your lunch hour chest spent only 30 minutes outside I want you to explores your eyes to the natural sunlight      Will meet again for annual physical exam mid-October please give this good try    Your blood pressure keep a record of your blood pressure but practice the deep breathing exercises and write down your blood pressure and if the blood pressure remains high please come and see me before mid-October    Blood pressure at the number that I would like to target is 140/80    Okay I can start you on a blood pressure medicine today okay

## 2024-12-10 ENCOUNTER — LAB (OUTPATIENT)
Dept: LAB | Facility: LAB | Age: 54
End: 2024-12-10
Payer: COMMERCIAL

## 2024-12-10 ENCOUNTER — APPOINTMENT (OUTPATIENT)
Dept: PRIMARY CARE | Facility: CLINIC | Age: 54
End: 2024-12-10
Payer: COMMERCIAL

## 2024-12-10 VITALS
HEIGHT: 63 IN | HEART RATE: 70 BPM | BODY MASS INDEX: 30.14 KG/M2 | DIASTOLIC BLOOD PRESSURE: 91 MMHG | OXYGEN SATURATION: 97 % | TEMPERATURE: 97.3 F | WEIGHT: 170.1 LBS | SYSTOLIC BLOOD PRESSURE: 165 MMHG

## 2024-12-10 DIAGNOSIS — E55.9 VITAMIN D DEFICIENCY: ICD-10-CM

## 2024-12-10 DIAGNOSIS — Z00.00 ROUTINE GENERAL MEDICAL EXAMINATION AT A HEALTH CARE FACILITY: ICD-10-CM

## 2024-12-10 DIAGNOSIS — E11.65 TYPE 2 DIABETES MELLITUS WITH HYPERGLYCEMIA, WITHOUT LONG-TERM CURRENT USE OF INSULIN: ICD-10-CM

## 2024-12-10 DIAGNOSIS — I10 PRIMARY HYPERTENSION: ICD-10-CM

## 2024-12-10 DIAGNOSIS — R19.00 ABDOMINAL MASS, UNSPECIFIED ABDOMINAL LOCATION: ICD-10-CM

## 2024-12-10 DIAGNOSIS — Z00.00 ROUTINE GENERAL MEDICAL EXAMINATION AT A HEALTH CARE FACILITY: Primary | ICD-10-CM

## 2024-12-10 DIAGNOSIS — N64.9 BREAST LESION: ICD-10-CM

## 2024-12-10 LAB
25(OH)D3 SERPL-MCNC: 21 NG/ML (ref 30–100)
ALBUMIN SERPL BCP-MCNC: 4.3 G/DL (ref 3.4–5)
ALP SERPL-CCNC: 72 U/L (ref 33–110)
ALT SERPL W P-5'-P-CCNC: 16 U/L (ref 7–45)
ANION GAP SERPL CALC-SCNC: 15 MMOL/L (ref 10–20)
AST SERPL W P-5'-P-CCNC: 20 U/L (ref 9–39)
BASOPHILS # BLD AUTO: 0.03 X10*3/UL (ref 0–0.1)
BASOPHILS NFR BLD AUTO: 0.5 %
BILIRUB SERPL-MCNC: 0.7 MG/DL (ref 0–1.2)
BUN SERPL-MCNC: 10 MG/DL (ref 6–23)
CALCIUM SERPL-MCNC: 9.6 MG/DL (ref 8.6–10.6)
CHLORIDE SERPL-SCNC: 103 MMOL/L (ref 98–107)
CHOLEST SERPL-MCNC: 230 MG/DL (ref 0–199)
CHOLESTEROL/HDL RATIO: 4.6
CO2 SERPL-SCNC: 29 MMOL/L (ref 21–32)
CREAT SERPL-MCNC: 0.85 MG/DL (ref 0.5–1.05)
EGFRCR SERPLBLD CKD-EPI 2021: 81 ML/MIN/1.73M*2
EOSINOPHIL # BLD AUTO: 0.06 X10*3/UL (ref 0–0.7)
EOSINOPHIL NFR BLD AUTO: 1 %
ERYTHROCYTE [DISTWIDTH] IN BLOOD BY AUTOMATED COUNT: 12.6 % (ref 11.5–14.5)
EST. AVERAGE GLUCOSE BLD GHB EST-MCNC: 157 MG/DL
GLUCOSE SERPL-MCNC: 111 MG/DL (ref 74–99)
HBA1C MFR BLD: 7.1 %
HCT VFR BLD AUTO: 42.8 % (ref 36–46)
HDLC SERPL-MCNC: 50.4 MG/DL
HGB BLD-MCNC: 13.6 G/DL (ref 12–16)
IMM GRANULOCYTES # BLD AUTO: 0.02 X10*3/UL (ref 0–0.7)
IMM GRANULOCYTES NFR BLD AUTO: 0.3 % (ref 0–0.9)
LDLC SERPL CALC-MCNC: 138 MG/DL
LYMPHOCYTES # BLD AUTO: 1.2 X10*3/UL (ref 1.2–4.8)
LYMPHOCYTES NFR BLD AUTO: 20.4 %
MCH RBC QN AUTO: 26.9 PG (ref 26–34)
MCHC RBC AUTO-ENTMCNC: 31.8 G/DL (ref 32–36)
MCV RBC AUTO: 85 FL (ref 80–100)
MONOCYTES # BLD AUTO: 0.27 X10*3/UL (ref 0.1–1)
MONOCYTES NFR BLD AUTO: 4.6 %
NEUTROPHILS # BLD AUTO: 4.3 X10*3/UL (ref 1.2–7.7)
NEUTROPHILS NFR BLD AUTO: 73.2 %
NON HDL CHOLESTEROL: 180 MG/DL (ref 0–149)
NRBC BLD-RTO: 0 /100 WBCS (ref 0–0)
PLATELET # BLD AUTO: 282 X10*3/UL (ref 150–450)
POTASSIUM SERPL-SCNC: 4.3 MMOL/L (ref 3.5–5.3)
PROT SERPL-MCNC: 7.4 G/DL (ref 6.4–8.2)
RBC # BLD AUTO: 5.06 X10*6/UL (ref 4–5.2)
SODIUM SERPL-SCNC: 143 MMOL/L (ref 136–145)
TRIGL SERPL-MCNC: 208 MG/DL (ref 0–149)
TSH SERPL-ACNC: 2.55 MIU/L (ref 0.44–3.98)
VLDL: 42 MG/DL (ref 0–40)
WBC # BLD AUTO: 5.9 X10*3/UL (ref 4.4–11.3)

## 2024-12-10 PROCEDURE — 3049F LDL-C 100-129 MG/DL: CPT | Performed by: FAMILY MEDICINE

## 2024-12-10 PROCEDURE — 85025 COMPLETE CBC W/AUTO DIFF WBC: CPT

## 2024-12-10 PROCEDURE — 3044F HG A1C LEVEL LT 7.0%: CPT | Performed by: FAMILY MEDICINE

## 2024-12-10 PROCEDURE — 3008F BODY MASS INDEX DOCD: CPT | Performed by: FAMILY MEDICINE

## 2024-12-10 PROCEDURE — 82306 VITAMIN D 25 HYDROXY: CPT

## 2024-12-10 PROCEDURE — 3077F SYST BP >= 140 MM HG: CPT | Performed by: FAMILY MEDICINE

## 2024-12-10 PROCEDURE — 36415 COLL VENOUS BLD VENIPUNCTURE: CPT

## 2024-12-10 PROCEDURE — 84443 ASSAY THYROID STIM HORMONE: CPT

## 2024-12-10 PROCEDURE — 1036F TOBACCO NON-USER: CPT | Performed by: FAMILY MEDICINE

## 2024-12-10 PROCEDURE — 80061 LIPID PANEL: CPT

## 2024-12-10 PROCEDURE — 99396 PREV VISIT EST AGE 40-64: CPT | Performed by: FAMILY MEDICINE

## 2024-12-10 PROCEDURE — 80053 COMPREHEN METABOLIC PANEL: CPT

## 2024-12-10 PROCEDURE — 83036 HEMOGLOBIN GLYCOSYLATED A1C: CPT

## 2024-12-10 PROCEDURE — 4010F ACE/ARB THERAPY RXD/TAKEN: CPT | Performed by: FAMILY MEDICINE

## 2024-12-10 PROCEDURE — 3080F DIAST BP >= 90 MM HG: CPT | Performed by: FAMILY MEDICINE

## 2024-12-10 RX ORDER — LOSARTAN POTASSIUM 100 MG/1
100 TABLET ORAL DAILY
Qty: 100 TABLET | Refills: 3 | Status: SHIPPED | OUTPATIENT
Start: 2024-12-10 | End: 2026-01-14

## 2024-12-10 NOTE — PROGRESS NOTES
"Subjective   Patient ID: Iva Pollock is a 54 y.o. female who presents for Annual Exam.    HPI     Review of Systems    Objective   BP (!) 165/91 (BP Location: Right arm, Patient Position: Sitting, BP Cuff Size: Adult)   Pulse 70   Temp 36.3 °C (97.3 °F) (Temporal)   Ht 1.6 m (5' 3\")   Wt 77.2 kg (170 lb 1.6 oz)   SpO2 97%   BMI 30.13 kg/m²     Physical Exam    Assessment/Plan          "

## 2024-12-10 NOTE — PROGRESS NOTES
"Subjective   Patient ID: Iva Pollock is a 54 y.o. female who presents for Annual Exam.    HPI     Review of Systems    Objective   BP (!) 165/91 (BP Location: Right arm, Patient Position: Sitting, BP Cuff Size: Adult)   Pulse 70   Temp 36.3 °C (97.3 °F) (Temporal)   Ht 1.6 m (5' 3\")   Wt 77.2 kg (170 lb 1.6 oz)   SpO2 97%   BMI 30.13 kg/m²     Physical Exam  Cardiovascular:      Rate and Rhythm: Regular rhythm.      Heart sounds: Normal heart sounds.   Pulmonary:      Breath sounds: Normal breath sounds.   Abdominal:      Comments: Indurated mases   Skin:     Comments:   Left breast Ulcerated indurated lesion    Neurological:      General: No focal deficit present.   Psychiatric:      Comments: anxious       Assessment/Plan     I think she might be having metastatic breast cancer . Ordered diagnostic mammogram, Ct abdomen pelvis . Spoke to Dr. Cook ( Breast surgery)    Will increase losartan 100 mg     Routine labs were ordered    Advised patient to come back in 6 months             "

## 2024-12-10 NOTE — PROGRESS NOTES
Iva Pollock female   1970 54 y.o.   [id number]      Chief Complaint  ***    History Of Present Illness  Iva Pollock is a 54 y.o. female presenting with ***. She denies breast biopsy or surgery. She denies family history breast cancer.    BREAST IMAGIN2024 bilateral diagnostic mammogram and ultrasound BI-RADS Category     REPRODUCTIVE HISTORY: menarche age, GP, first birth age, , OCP's, premenopausal, LMP,  no HRT, breast tissue                                   FAMILY CANCER HISTORY:      Surgical History  She has a past surgical history that includes Appendectomy (2016) and Cholecystectomy.     Social History  She reports that she has never smoked. She has never used smokeless tobacco. She reports that she does not currently use alcohol. She reports that she does not use drugs.    Family History  Family History   Problem Relation Name Age of Onset    Diabetes Mother      Diabetes Maternal Grandmother      Diabetes Maternal Grandfather      Diabetes Paternal Grandmother      Diabetes Paternal Grandfather          Allergies  Sulfa (sulfonamide antibiotics) and Sulfamethoxazole-trimethoprim    Medications  Current Outpatient Medications   Medication Instructions    blood sugar diagnostic (Accu-Chek Guide test strips) strip 1 strip, miscellaneous, 2 times daily    blood-glucose meter (Accu-Chek Guide Glucose Meter) misc Use as directed to test blood sugar daily    lancets (Accu-Chek Fastclix Lancet Drum) misc Use 1 lancet to check blood sugar twice a day as directed.    losartan (COZAAR) 100 mg, oral, Daily    metFORMIN XR (Glucophage-XR) 500 mg 24 hr tablet Take 2 tablets by mouth in the morning and 1 tablet by mouth in the evening with meals. Do not crush, chew, or split.         REVIEW OF SYSTEMS    Constitutional:  Negative for appetite change, fatigue, fever and unexpected weight change.   HENT:  Negative for ear pain, hearing loss, nosebleeds, sore throat and trouble  swallowing.    Eyes:  Negative for discharge, itching and visual disturbance.   Respiratory:  Negative for cough, chest tightness and shortness of breath.    Cardiovascular:  Negative for chest pain, palpitations and leg swelling.   Breast: as indicated in HPI  Gastrointestinal:  Negative for abdominal pain, constipation, diarrhea and nausea.   Endocrine: Negative for cold intolerance and heat intolerance.   Genitourinary:  Negative for dysuria, frequency, hematuria, pelvic pain and vaginal bleeding.   Musculoskeletal:  Negative for arthralgias, back pain, gait problem, joint swelling and myalgias.   Skin:  Negative for color change and rash.   Allergic/Immunologic: Negative for environmental allergies and food allergies.   Neurological:  Negative for dizziness, tremors, speech difficulty, weakness, numbness and headaches.   Hematological:  Does not bruise/bleed easily.   Psychiatric/Behavioral:  Negative for agitation, dysphoric mood and sleep disturbance. The patient is not nervous/anxious.         Past Medical History  She has a past medical history of Diabetes mellitus, type 2 (Multi), Gout, unspecified (07/26/2014), Other conditions influencing health status (12/26/2013), Other general symptoms and signs (06/04/2019), Pain in left shoulder (09/19/2016), Pain in right foot (06/12/2016), Pain in right hip (09/19/2016), Pain in unspecified foot (07/26/2014), Pneumonia, unspecified organism (03/13/2016), and Thrombocytopenia (CMS-[facility]).     Physical Exam  Patient is alert and oriented x3 and in a relaxed and appropriate mood. Her gait is steady and hand grasps are equal. Sclera is clear. The breasts are nearly symmetrical. The tissue is soft without palpable abnormalities, discrete nodules or masses. The skin and nipples appear normal. There is no cervical, supraclavicular or axillary lymphadenopathy. Heart rate and rhythm normal, S1 and S2 appreciated. The lungs are clear to auscultation bilaterally. Abdomen is  soft and non-tender.       Physical Exam     Last Recorded Vitals  There were no vitals filed for this visit.    Relevant Results   Time was spent viewing digital images of the radiology testing with the patient. I explained the results in depth, along with suggested explanation for follow up recommendations based on the testing results. BI-RADS Category ***    Imaging  No results found for this or any previous visit from the past 365 days.       Assessment/Plan       PLAN:  ***    Patient Discussion/Summary  Your clinical examination and imaging are normal. Please return in one year for bilateral screening mammogram and office visit or sooner if you have any problems or concerns.     You can see your health information, review clinical summaries from office visits & test results online when you follow your health with MY  Chart, a personal health record. To sign up go to [email or web address]. If you need assistance with signing up or trouble getting into your account call [facility] Patient Line 24/7 at [phone].    My office phone number is [phone] if you need to get in touch with me or have additional questions or concerns. Thank you for choosing [facility] and trusting me as your healthcare provider. I look forward to seeing you again at your next office visit. I am honored to be a provider on your health care team and I remain dedicated to helping you achieve your health goals.      Hilda Ford, TAMIKO-CNP

## 2024-12-11 ENCOUNTER — PROCEDURE VISIT (OUTPATIENT)
Dept: SURGICAL ONCOLOGY | Facility: CLINIC | Age: 54
End: 2024-12-11
Payer: COMMERCIAL

## 2024-12-11 ENCOUNTER — HOSPITAL ENCOUNTER (OUTPATIENT)
Dept: RADIOLOGY | Facility: HOSPITAL | Age: 54
End: 2024-12-11
Payer: COMMERCIAL

## 2024-12-11 ENCOUNTER — HOSPITAL ENCOUNTER (OUTPATIENT)
Dept: RADIOLOGY | Facility: CLINIC | Age: 54
Discharge: HOME | End: 2024-12-11
Payer: COMMERCIAL

## 2024-12-11 ENCOUNTER — HOSPITAL ENCOUNTER (OUTPATIENT)
Dept: RADIOLOGY | Facility: HOSPITAL | Age: 54
Discharge: HOME | End: 2024-12-11
Payer: COMMERCIAL

## 2024-12-11 ENCOUNTER — APPOINTMENT (OUTPATIENT)
Dept: RADIOLOGY | Facility: CLINIC | Age: 54
End: 2024-12-11
Payer: COMMERCIAL

## 2024-12-11 VITALS
BODY MASS INDEX: 33.29 KG/M2 | DIASTOLIC BLOOD PRESSURE: 119 MMHG | WEIGHT: 187.94 LBS | SYSTOLIC BLOOD PRESSURE: 172 MMHG | HEART RATE: 66 BPM | TEMPERATURE: 95.1 F

## 2024-12-11 VITALS — BODY MASS INDEX: 29.77 KG/M2 | WEIGHT: 168 LBS | HEIGHT: 63 IN

## 2024-12-11 DIAGNOSIS — R19.00 ABDOMINAL MASS, UNSPECIFIED ABDOMINAL LOCATION: ICD-10-CM

## 2024-12-11 DIAGNOSIS — N64.9 BREAST LESION: ICD-10-CM

## 2024-12-11 DIAGNOSIS — R59.0 AXILLARY LYMPHADENOPATHY: ICD-10-CM

## 2024-12-11 DIAGNOSIS — N63.25 MASS OVERLAPPING MULTIPLE QUADRANTS OF LEFT BREAST: ICD-10-CM

## 2024-12-11 DIAGNOSIS — R92.8 OTHER ABNORMAL AND INCONCLUSIVE FINDINGS ON DIAGNOSTIC IMAGING OF BREAST: ICD-10-CM

## 2024-12-11 DIAGNOSIS — R59.0 AXILLARY LYMPHADENOPATHY: Primary | ICD-10-CM

## 2024-12-11 PROCEDURE — 38505 NEEDLE BIOPSY LYMPH NODES: CPT | Mod: LEFT SIDE | Performed by: RADIOLOGY

## 2024-12-11 PROCEDURE — 19083 BX BREAST 1ST LESION US IMAG: CPT | Mod: LT

## 2024-12-11 PROCEDURE — 99214 OFFICE O/P EST MOD 30 MIN: CPT | Mod: 25 | Performed by: NURSE PRACTITIONER

## 2024-12-11 PROCEDURE — 2780000003 HC OR 278 NO HCPCS

## 2024-12-11 PROCEDURE — 2720000007 HC OR 272 NO HCPCS

## 2024-12-11 PROCEDURE — C1819 TISSUE LOCALIZATION-EXCISION: HCPCS

## 2024-12-11 PROCEDURE — 2550000001 HC RX 255 CONTRASTS: Performed by: FAMILY MEDICINE

## 2024-12-11 PROCEDURE — 76982 USE 1ST TARGET LESION: CPT | Mod: 50,LT

## 2024-12-11 PROCEDURE — 77062 BREAST TOMOSYNTHESIS BI: CPT

## 2024-12-11 PROCEDURE — 19083 BX BREAST 1ST LESION US IMAG: CPT | Mod: LEFT SIDE | Performed by: RADIOLOGY

## 2024-12-11 PROCEDURE — 77062 BREAST TOMOSYNTHESIS BI: CPT | Mod: LEFT SIDE | Performed by: STUDENT IN AN ORGANIZED HEALTH CARE EDUCATION/TRAINING PROGRAM

## 2024-12-11 PROCEDURE — 38505 NEEDLE BIOPSY LYMPH NODES: CPT

## 2024-12-11 PROCEDURE — 76642 ULTRASOUND BREAST LIMITED: CPT | Mod: LT

## 2024-12-11 PROCEDURE — 10035 PLMT SFT TISS LOCLZJ DEV 1ST: CPT | Mod: LEFT SIDE | Performed by: RADIOLOGY

## 2024-12-11 PROCEDURE — 2500000004 HC RX 250 GENERAL PHARMACY W/ HCPCS (ALT 636 FOR OP/ED): Performed by: RADIOLOGY

## 2024-12-11 PROCEDURE — 77066 DX MAMMO INCL CAD BI: CPT | Mod: LEFT SIDE | Performed by: STUDENT IN AN ORGANIZED HEALTH CARE EDUCATION/TRAINING PROGRAM

## 2024-12-11 PROCEDURE — A4648 IMPLANTABLE TISSUE MARKER: HCPCS

## 2024-12-11 PROCEDURE — 74177 CT ABD & PELVIS W/CONTRAST: CPT | Performed by: RADIOLOGY

## 2024-12-11 PROCEDURE — 74177 CT ABD & PELVIS W/CONTRAST: CPT

## 2024-12-11 PROCEDURE — 99204 OFFICE O/P NEW MOD 45 MIN: CPT | Performed by: NURSE PRACTITIONER

## 2024-12-11 PROCEDURE — 76642 ULTRASOUND BREAST LIMITED: CPT | Mod: LEFT SIDE | Performed by: STUDENT IN AN ORGANIZED HEALTH CARE EDUCATION/TRAINING PROGRAM

## 2024-12-11 RX ORDER — METFORMIN HYDROCHLORIDE 500 MG/1
1000 TABLET, EXTENDED RELEASE ORAL
Qty: 200 TABLET | Refills: 3 | Status: SHIPPED | OUTPATIENT
Start: 2024-12-11 | End: 2025-12-11

## 2024-12-11 RX ORDER — METRONIDAZOLE 500 MG/1
TABLET ORAL
Qty: 30 TABLET | Refills: 3 | Status: SHIPPED | OUTPATIENT
Start: 2024-12-11

## 2024-12-11 ASSESSMENT — PAIN SCALES - GENERAL
PAINLEVEL_OUTOF10: 2
PAINLEVEL_OUTOF10: 2
PAINLEVEL_OUTOF10: 5 - MODERATE PAIN
PAINLEVEL_OUTOF10: 0-NO PAIN
PAINLEVEL_OUTOF10: 5 - MODERATE PAIN

## 2024-12-11 ASSESSMENT — PATIENT HEALTH QUESTIONNAIRE - PHQ9
1. LITTLE INTEREST OR PLEASURE IN DOING THINGS: NOT AT ALL
SUM OF ALL RESPONSES TO PHQ9 QUESTIONS 1 & 2: 0
2. FEELING DOWN, DEPRESSED OR HOPELESS: NOT AT ALL
SUM OF ALL RESPONSES TO PHQ9 QUESTIONS 1 & 2: 0
1. LITTLE INTEREST OR PLEASURE IN DOING THINGS: NOT AT ALL
2. FEELING DOWN, DEPRESSED OR HOPELESS: NOT AT ALL

## 2024-12-11 NOTE — DISCHARGE INSTRUCTIONS
AFTER THE TEST  A steri-strip and bandage will be placed over the incision. You may shower after 24 hours. Remove bandage after 24 hours. Remove bandage after the shower. Leave the steri-strips in place to fall off on their own. If after 1 week the steri-strips are still on, you may remove them. Avoid swimming or soaking in tub for 3 days.     You may have mild discomfort at the test site. If needed, you may take Tylenol (Acetaminophen) for pain. Please avoid taking NSAIDs, Motrin, Advil, Aleve, or ibuprofen for 24 hours following the biopsy. After 24 hours you may resume NSAIDSs.     If you take aspirin, Plavix, Coumadin, Xarelto or Eliquis please tell us. If these medications were stopped by your provider, please ask them when to resume.     You may have some tenderness, bruising or slight bleeding at the site. Please apply ice packs to the site for 15 minutes on and 15 minutes off for a 2 hour minimum.     Most people can return to their usual routine after the procedure. Avoid Strenuous activity for 24 hours.     Sleep in a bra the night after your biopsy. Continue to do so for comfort.     Call your provider if you have any of the following symptoms :  Fever  Increased pain  Increased bleeding  Redness  Increased swelling  Yellowish drainage  Your provider will get the biopsy results within 5 - 7 days. Call your provider with any questions.     Patient education brochure and pain/comfort measures have been reviewed.   Phone number provided to contact Breast Center if problems arise.     Patient verbalized understanding of home going instructions. Reviewed instructions from Isabela Solomon CNP regarding care of patient's breast wound. Given supplies and she will  antifungal prescription and follow script instructions for use.  Patient left at 1415.

## 2024-12-11 NOTE — PATIENT INSTRUCTIONS
Thank you for coming to see me today at [facility]. I recommend biopsy. A breast radiology physician will perform the biopsy. Possible diagnoses include benign, atypical, or cancer as we discussed.  Bruising and mild discomfort after the biopsy is normal and will improve. I normally have results in 7-10 business days. I will call you with results, please have your phone handy to take my call.    IMPORTANT INFORMATION REGARDING YOUR RESULTS  If you receive medical information from My [facility] Personal Health Record (online chart) your results will be released into your chart. This means you may view or see results of your biopsy or procedure before I contact you directly. If this occurs, please call the office and we will discuss your results over the phone.     You can see your health information, review clinical summaries from office visits & test results online when you follow your health with MY  Chart, a personal health record. To sign up go to [email or web address]. If you need assistance with signing up or trouble getting into your account call [employer] Patient Line 24/7 at [phone].     Should you have any questions or concerns after biopsy, please do not hesitate to call my office at [phone]. If it has been more than a week since your biopsy was performed and you have not been given the results, please call my office [phone]. Thank you for choosing [facility] and trusting me as your healthcare provider. I am honored to be a provider on your health care team and I remain dedicated to helping you achieve your health goals.

## 2024-12-11 NOTE — PROGRESS NOTES
Iva Pollock female   1970 54 y.o.   [id number]      Chief Complaint    New Patient Visit          [facility]  Iva Pollock is a 54 y.o.   (D[place]) female referred by Nancy Belcher MD to the [facility] for left breast non-tender mass. She noted left breast mass about a year ago. The mass has progressed through the skin and the nipple has resolved. She denies breast surgery or biopsy. She has no family history of breast or ovarian cancer.     She presents with her  Jose Luis.     BREAST IMAGING: bilateral diagnostic mammogram and ultrasound to be performed today. 11/21/2016 bilateral screening mammogram, BI-RADS Category 1.    REPRODUCTIVE HISTORY: menarche age 12, nullipara, no OCPs, perimenopausal, LMP 9/2024, heterogeneously dense tissue    FAMILY CANCER HISTORY:   Maternal Uncle: throat cancer (smoker)    REVIEW OF SYSTEMS    Constitutional:  Negative for appetite change, fatigue, fever and unexpected weight change.   HENT:  Negative for ear pain, hearing loss, nosebleeds, sore throat and trouble swallowing.    Eyes:  Negative for discharge, itching and visual disturbance.   Respiratory:  Negative for cough, chest tightness and shortness of breath.    Cardiovascular:  Negative for chest pain, palpitations and leg swelling.   Breast: as indicated in HPI  Gastrointestinal:  Negative for abdominal pain, constipation, diarrhea and nausea.   Endocrine: Negative for cold intolerance and heat intolerance.   Genitourinary:  Negative for dysuria, frequency, hematuria, pelvic pain and vaginal bleeding.   Musculoskeletal:  Negative for arthralgias, back pain, gait problem, joint swelling and myalgias.   Skin:  Negative for color change and rash.   Allergic/Immunologic: Negative for environmental allergies and food allergies.   Neurological:  Negative for dizziness, tremors, speech difficulty, weakness, numbness and headaches.   Hematological:  Does not bruise/bleed easily.    Psychiatric/Behavioral:  Negative for agitation, dysphoric mood and sleep disturbance. The patient is not nervous/anxious.         MEDICATIONS  Current Outpatient Medications   Medication Instructions    blood sugar diagnostic (Accu-Chek Guide test strips) strip 1 strip, miscellaneous, 2 times daily    blood-glucose meter (Accu-Chek Guide Glucose Meter) misc Use as directed to test blood sugar daily    lancets (Accu-Chek Fastclix Lancet Drum) misc Use 1 lancet to check blood sugar twice a day as directed.    losartan (COZAAR) 100 mg, oral, Daily    metFORMIN XR (Glucophage-XR) 500 mg 24 hr tablet Take 2 tablets by mouth in the morning and 1 tablet by mouth in the evening with meals. Do not crush, chew, or split.    metFORMIN XR (GLUCOPHAGE-XR) 1,000 mg, oral, Daily with breakfast, Do not crush, chew, or split.    metroNIDAZOLE (Flagyl) 500 mg tablet 500mg tablet, crush tablet and sprinkle over open wound twice daily        ALLERGIES  Allergies   Allergen Reactions    Sulfa (Sulfonamide Antibiotics) Rash    Sulfamethoxazole-Trimethoprim Rash and Swelling        Patient Active Problem List    Diagnosis Date Noted    Type 2 diabetes mellitus with hyperglycemia, without long-term current use of insulin 02/08/2024    Fever 01/25/2024    Head ache 01/25/2024    Obesity (BMI 30-39.9) 01/25/2024    Polyphagia 01/25/2024    Suspected dengue fever 01/25/2024    Thrombocytopenia (CMS-[facility]) 01/25/2024     Past Medical History:   Diagnosis Date    Diabetes mellitus, type 2 (Multi)     Gout, unspecified 07/26/2014    Acute gout    Other conditions influencing health status 12/26/2013    Influenza Type A    Other general symptoms and signs 06/04/2019    Suspected dengue fever    Pain in left shoulder 09/19/2016    Left shoulder pain    Pain in right foot 06/12/2016    Right foot pain    Pain in right hip 09/19/2016    Right hip pain    Pain in unspecified foot 07/26/2014    Foot pain    Pneumonia, unspecified organism 03/13/2016     Community acquired pneumonia    Thrombocytopenia (CMS-HCC)       Past Surgical History:   Procedure Laterality Date    APPENDECTOMY  09/19/2016    Appendectomy    CHOLECYSTECTOMY        Family History   Problem Relation Name Age of Onset    Diabetes Mother      Hypertension Father      Throat cancer Mother's Brother      Diabetes Maternal Grandmother      Diabetes Maternal Grandfather      Diabetes Paternal Grandmother      Diabetes Paternal Grandfather            SOCIAL HISTORY      Social History     Tobacco Use    Smoking status: Never    Smokeless tobacco: Never   Substance Use Topics    Alcohol use: Not Currently        VITALS  Vitals:    12/11/24 1110   BP: (!) 172/119   Pulse: 66   Temp: 35.1 °C (95.1 °F)        PHYSICAL EXAM  Patient is alert and oriented x3, with appropriate mood, tearful. The gait is steady and hand grasps are equal. Sclera clear. The left superolateral breast with hard irregular 7cm+ mass with skin involvement and 2 x1cm open skin and clear drainage near the resolved nipple. The right breast tissue is soft without palpable abnormalities, discrete nodules or masses. The left axilla with fullness and one abnormal 2cm axillary lymph node. There is no cervical, supraclavicular, or axillary lymphadenopathy palpable. Heart rate and rhythm normal, S1 and S2 appreciated. The lungs are clear bilaterally. Abdomen is soft & non-tender.    Physical Exam  Chest:              IMAGING    BI mammo bilateral diagnostic tomosynthesis 12/11/2024  BI US breast limited left 12/11/2024    Narrative  Interpreted By:  Prasanth Corley,  STUDY:  BI US BREAST LIMITED LEFT; BI MAMMO BILATERAL DIAGNOSTIC  TOMOSYNTHESIS;  12/11/2024 11:49 am; 12/11/2024 10:37 am    ACCESSION NUMBER(S):  [id number]; [id number]    ORDERING CLINICIAN:  STEPHON JAMISON    INDICATION:  Patient presents for evaluation of an ulcerating left breast lump  with inversion of the nipple and spontaneous bloody nipple discharge.    ,N64.9  Disorder of breast, unspecified    COMPARISON:  11/21/2016    FINDINGS:  MAMMOGRAPHY: 2D and tomosynthesis images were reviewed at 1 mm slice  thickness.    Density:  The breasts are heterogeneously dense, which may obscure  small masses.    A radiopaque marker was placed on the skin at the site of the  patient's area of concern in the left upper outer breast. There is a  large high density spiculated mass with protrusion out of the skin  and associated skin thickening underneath the radiopaque marker.  There is also a spiculated mass in the left axilla.    No suspicious masses or calcifications are identified in the right  breast.    ULTRASOUND: Targeted ultrasound was performed of the left breast and  axilla by a registered sonographer with elastography. In the left  breast at 12 o'clock, 3 cm from the nipple in the patient's area of  palpable concern, there is a large heterogeneous hypoechoic mass  measuring at least 6.1 x 3.2 x 4.3 cm which is stiff on elastography  and demonstrates internal flow. Scanning of the left axilla  demonstrates an irregular lymph node with loss of fatty hilum and  abnormal vascular flow measuring 1.7 x 1.6 x 1.1 cm. 3 additional  morphologically normal lymph nodes are seen.    Impression  Highly suspicious ulcerating left breast mass with left axillary  lymphadenopathy. Further evaluation with surgical consultation and  ultrasound-guided biopsy is recommended. Dr. Prasanth Corley explained the  findings and recommendations to the patient at the time of exam. A  message was sent to the referring practitioner at the time of this  dictation regarding these findings using the epic critical findings  reporting system. A pre-procedure form was filled out. Biopsy will be  subsequently performed this afternoon.    No mammographic evidence of malignancy in the right breast.    Method of Detection: Category Pat - Patient Reported Self-examination  Finding    BI-RADS CATEGORY:  BI-RADS CATEGORY:  5  Highly Suggestive of Malignancy.  Recommendation:  Surgical Consultation and Biopsy.  Recommended Date:  Immediate.  Laterality:  Left.    For any future breast imaging appointments, please call [phone] ([phone]).      MACRO:  Critical Finding:  Breast Imaging Abnormality. Notification was  initiated on 12/11/2024 at 3:54 pm by  Prasanth Corley.  (**-YCF-**)  Instructions:  Surgical Consultation and Imaging Guided Biopsy.    Signed by: Prasanth Corley 12/11/2024 3:54 PM  Dictation workstation:   [id number]      Time was spent viewing digital images of the radiology testing with the patient. I explained the results in depth, along with suggested explanation for follow up recommendations based on the testing results.          ORDERS  Orders Placed This Encounter   Procedures    BI US guided breast localization and biopsy left     Standing Status:   Future     Number of Occurrences:   1     Standing Expiration Date:   2/11/2026     Order Specific Question:   Reason for exam:     Answer:   left     Order Specific Question:   Radiologist to Determine Optimal Study     Answer:   Yes     Order Specific Question:   Release result to [employer]     Answer:   Immediate [1]     Order Specific Question:   Is this exam part of a Research Study? If Yes, link this order to the research study     Answer:   No    BI US biopsy of lymph node     Standing Status:   Future     Number of Occurrences:   1     Standing Expiration Date:   2/11/2026     Order Specific Question:   Is the patient pregnant?     Answer:   No     Order Specific Question:   Number of Lesion(s)     Answer:   1     Order Specific Question:   Location to place 1     Answer:   Axilla     Order Specific Question:   Lesion 1 Biopsy     Answer:   Biopsy US     Order Specific Question:   Breast Laterality Lesion 1     Answer:   Left     Order Specific Question:   Breast Abnormality Lesion 1     Answer:   Lymph Node     Order Specific Question:   Reason for exam:     Answer:   .      Order Specific Question:   Radiologist to Determine Optimal Study     Answer:   Yes     Order Specific Question:   Release result to [employer]     Answer:   Immediate [1]     Order Specific Question:   Is this exam part of a Research Study? If Yes, link this order to the research study     Answer:   No          ASSESSMENT/PLAN  1. Axillary lymphadenopathy  BI US biopsy of lymph node      2. Breast lesion  Referral to Breast Surgery      3. Mass overlapping multiple quadrants of left breast  BI US guided breast localization and biopsy left    metroNIDAZOLE (Flagyl) 500 mg tablet             Follow up for is to proceed to biopsy.  Proceed to biopsy. A breast radiology physician will perform the biopsy. Results are usually available in about 7 business days. I will call patient with results and instruct on next steps and plan.         Isabela Solomon, TAMIKO-[facility]

## 2024-12-11 NOTE — Clinical Note
Done with breast site biopsy.  Pressure held x 10 minutes, incision dry, steri strips intact and compression dressing applied.

## 2024-12-12 ENCOUNTER — TELEPHONE (OUTPATIENT)
Dept: RADIOLOGY | Facility: CLINIC | Age: 54
End: 2024-12-12

## 2024-12-13 ENCOUNTER — APPOINTMENT (OUTPATIENT)
Dept: SURGICAL ONCOLOGY | Facility: HOSPITAL | Age: 54
End: 2024-12-13
Payer: COMMERCIAL

## 2024-12-17 ENCOUNTER — TELEPHONE (OUTPATIENT)
Dept: SURGICAL ONCOLOGY | Facility: CLINIC | Age: 54
End: 2024-12-17
Payer: COMMERCIAL

## 2024-12-17 DIAGNOSIS — C77.3 BREAST CANCER METASTASIZED TO AXILLARY LYMPH NODE, LEFT (MULTI): Primary | ICD-10-CM

## 2024-12-17 DIAGNOSIS — C50.912 BREAST CANCER METASTASIZED TO AXILLARY LYMPH NODE, LEFT (MULTI): Primary | ICD-10-CM

## 2024-12-17 LAB
LAB AP ASR DISCLAIMER: NORMAL
LAB AP ASR DISCLAIMER: NORMAL
LABORATORY COMMENT REPORT: NORMAL
LABORATORY COMMENT REPORT: NORMAL
PATH REPORT.ADDENDUM SPEC: NORMAL
PATH REPORT.ADDENDUM SPEC: NORMAL
PATH REPORT.FINAL DX SPEC: NORMAL
PATH REPORT.FINAL DX SPEC: NORMAL
PATH REPORT.GROSS SPEC: NORMAL
PATH REPORT.GROSS SPEC: NORMAL
PATH REPORT.RELEVANT HX SPEC: NORMAL
PATH REPORT.RELEVANT HX SPEC: NORMAL
PATH REPORT.TOTAL CANCER: NORMAL
PATH REPORT.TOTAL CANCER: NORMAL

## 2024-12-17 PROCEDURE — 88363 XM ARCHIVE TISSUE MOLEC ANAL: CPT | Performed by: PATHOLOGY

## 2024-12-17 NOTE — RESULT ENCOUNTER NOTE
Informed breast and lymph node biopsy show breast cancer. She will meet with medical and surgical oncologist and plan for care.

## 2024-12-17 NOTE — TELEPHONE ENCOUNTER
"Result Communication    Resulted Orders   Surgical Pathology Exam   Result Value Ref Range    Case Report       Surgical Pathology                                Case: [id number]                                  Authorizing Provider:  TAMIKO Elliott-THOMAS  Collected:           12/11/2024 1304              Ordering Location:     [facility]       Received:            12/11/2024 1436                                     Center                                                                       Pathologist:           Kalie Sood MD                                                           Specimens:   A) - BREAST CORE BIOPSY LEFT, Left breast 12:00 3 cmfn                                              B) - AXILLARY LYMPH NODE BIOPSY LEFT - BREAST, Left Axillary Lymph Node                    FINAL DIAGNOSIS       A. Left breast mass, 12:00, 3 cm from nipple, ultrasound guided core needle biopsy:  -- Invasive ductal carcinoma, grade 2, see note.    Note:  In this limited sample, the invasive carcinoma measures up to 7 mm in greatest dimension.  ER, CT and HER2 will be reported in an addendum.    B. Left axillary node, ultrasound guided core needle biopsy:  -- Invasive ductal carcinoma involving fibrous and fibroadipose tissue, see note.    Note: Invasive carcinoma involves predominantly fibrous and fibroadipose tissue. While there are few lymphocytes present, a definitive lymph node architecture is not identified. This may represent a lymph completely involved by carcinoma. Clinical and radiologic correlation is recommended.                  By the signature on this report, the individual or group listed as making the Final Interpretation/Diagnosis certifies that they have reviewed this case.       Clinical History       Ultrasound Guided Core Biopsy Left Breast Mass 12:00 3 cm fn      Gross Description       A: Received in formalin, labeled with the patient´s name and hospital number and \"left breast " "12:00 3 cm FN\", are multiple irregular/cylindrical segments of yellow-white fatty soft tissue aggregating to 14.2 x 0.5 x 0.2 cm.  The specimen is submitted in toto in two cassettes.  RCC    NOTE:  Ischemia time: 12/11/24 at 1:04.  This specimen was placed into formalin at: 12/11/24 1:20 p.  B: Received in formalin, labeled with the patient´s name and hospital number and \"left axillary lymph node\", are multiple irregular/cylindrical segments of yellow-white fatty soft tissue aggregating to 1.4 x 0.3 x 0.2 cm.  The specimen is submitted in toto in one cassette.  RCC      Disclaimer       One or more of the reagents used to perform assays on this specimen MAY have contained components considered to be analyte specific reagents (ASR's).  ASR's have not been cleared or approved by the U.S. Food and Drug Administration.  These assays were developed and their performance characteristics determined by the Department of Pathology at [facility]. The FDA does not require this test to go through premarket FDA review. This test is used for clinical purposes. It should not be regarded as investigational or for research. This laboratory is certified under the Clinical Laboratory Improvement Amendments (CLIA) as qualified to perform high complexity clinical laboratory testing.  The assays were performed with appropriate positive and negative controls which stained appropriately.     Surgical Pathology Exam   Result Value Ref Range    Case Report       Surgical Pathology                                Case: [id number]                                  Authorizing Provider:  TAMIKO Elliott-CNP  Collected:           12/11/2024 1304              Ordering Location:     [facility]       Received:            12/11/2024 1436                                     Center                                                                       Pathologist:           Kalie Sood MD             " "                                              Specimens:   A) - BREAST CORE BIOPSY LEFT, Left breast 12:00 3 cmfn                                              B) - AXILLARY LYMPH NODE BIOPSY LEFT - BREAST, Left Axillary Lymph Node                    FINAL DIAGNOSIS       A. Left breast mass, 12:00, 3 cm from nipple, ultrasound guided core needle biopsy:  -- Invasive ductal carcinoma, grade 2, see note.    Note:  In this limited sample, the invasive carcinoma measures up to 7 mm in greatest dimension.  ER, NE and HER2 will be reported in an addendum.    B. Left axillary node, ultrasound guided core needle biopsy:  -- Invasive ductal carcinoma involving fibrous and fibroadipose tissue, see note.    Note: Invasive carcinoma involves predominantly fibrous and fibroadipose tissue. While there are few lymphocytes present, a definitive lymph node architecture is not identified. This may represent a lymph completely involved by carcinoma. Clinical and radiologic correlation is recommended.                  By the signature on this report, the individual or group listed as making the Final Interpretation/Diagnosis certifies that they have reviewed this case.       Clinical History       Ultrasound Guided Core Biopsy Left Breast Mass 12:00 3 cm fn      Gross Description       A: Received in formalin, labeled with the patient´s name and hospital number and \"left breast 12:00 3 cm FN\", are multiple irregular/cylindrical segments of yellow-white fatty soft tissue aggregating to 14.2 x 0.5 x 0.2 cm.  The specimen is submitted in toto in two cassettes.  RCC    NOTE:  Ischemia time: 12/11/24 at 1:04.  This specimen was placed into formalin at: 12/11/24 1:20 p.  B: Received in formalin, labeled with the patient´s name and hospital number and \"left axillary lymph node\", are multiple irregular/cylindrical segments of yellow-white fatty soft tissue aggregating to 1.4 x 0.3 x 0.2 cm.  The specimen is submitted in toto in one " cassette.  RCC      Disclaimer       One or more of the reagents used to perform assays on this specimen MAY have contained components considered to be analyte specific reagents (ASR's).  ASR's have not been cleared or approved by the U.S. Food and Drug Administration.  These assays were developed and their performance characteristics determined by the Department of Pathology at [facility]. The FDA does not require this test to go through premarket FDA review. This test is used for clinical purposes. It should not be regarded as investigational or for research. This laboratory is certified under the Clinical Laboratory Improvement Amendments (CLIA) as qualified to perform high complexity clinical laboratory testing.  The assays were performed with appropriate positive and negative controls which stained appropriately.         10:23 AM      Results were successfully communicated with the patient and they acknowledged their understanding. Informed breast and lymph node biopsy show breast cancer. She will meet with medical and surgical oncologist and plan for care.

## 2024-12-23 ENCOUNTER — PATIENT OUTREACH (OUTPATIENT)
Dept: HEMATOLOGY/ONCOLOGY | Facility: HOSPITAL | Age: 54
End: 2024-12-23
Payer: COMMERCIAL

## 2024-12-23 SDOH — ECONOMIC STABILITY: TRANSPORTATION INSECURITY
IN THE PAST 12 MONTHS, HAS THE LACK OF TRANSPORTATION KEPT YOU FROM MEDICAL APPOINTMENTS OR FROM GETTING MEDICATIONS?: NO

## 2024-12-23 SDOH — ECONOMIC STABILITY: FOOD INSECURITY: WITHIN THE PAST 12 MONTHS, THE FOOD YOU BOUGHT JUST DIDN'T LAST AND YOU DIDN'T HAVE MONEY TO GET MORE.: NEVER TRUE

## 2024-12-23 SDOH — ECONOMIC STABILITY: INCOME INSECURITY: IN THE LAST 12 MONTHS, WAS THERE A TIME WHEN YOU WERE NOT ABLE TO PAY THE MORTGAGE OR RENT ON TIME?: NO

## 2024-12-23 SDOH — ECONOMIC STABILITY: FOOD INSECURITY: WITHIN THE PAST 12 MONTHS, YOU WORRIED THAT YOUR FOOD WOULD RUN OUT BEFORE YOU GOT MONEY TO BUY MORE.: NEVER TRUE

## 2024-12-23 SDOH — HEALTH STABILITY: PHYSICAL HEALTH: ON AVERAGE, HOW MANY DAYS PER WEEK DO YOU ENGAGE IN MODERATE TO STRENUOUS EXERCISE (LIKE A BRISK WALK)?: 3 DAYS

## 2024-12-23 SDOH — ECONOMIC STABILITY: GENERAL
WHICH OF THE FOLLOWING WOULD YOU LIKE TO GET CONNECTED TO IN ORDER TO RECEIVE A DISCOUNT OR FOR FREE? (CHOOSE ALL THAT APPLY): NO ASSISTANCE NEEDED

## 2024-12-23 SDOH — HEALTH STABILITY: PHYSICAL HEALTH: ON AVERAGE, HOW MANY MINUTES DO YOU ENGAGE IN EXERCISE AT THIS LEVEL?: 30 MIN

## 2024-12-23 SDOH — ECONOMIC STABILITY: GENERAL
WHICH OF THE FOLLOWING DO YOU KNOW HOW TO USE AND HAVE ACCESS TO EVERY DAY? (CHOOSE ALL THAT APPLY): DESKTOP COMPUTER, LAPTOP COMPUTER, OR TABLET WITH BROADBAND INTERNET CONNECTION;SMARTPHONE WITH CELLULAR DATA PLAN

## 2024-12-23 SDOH — ECONOMIC STABILITY: TRANSPORTATION INSECURITY
IN THE PAST 12 MONTHS, HAS LACK OF TRANSPORTATION KEPT YOU FROM MEETINGS, WORK, OR FROM GETTING THINGS NEEDED FOR DAILY LIVING?: NO

## 2024-12-23 ASSESSMENT — SOCIAL DETERMINANTS OF HEALTH (SDOH)
IN THE PAST 12 MONTHS, HAS THE ELECTRIC, GAS, OIL, OR WATER COMPANY THREATENED TO SHUT OFF SERVICE IN YOUR HOME?: NO
HOW OFTEN DO YOU ATTENT MEETINGS OF THE CLUB OR ORGANIZATION YOU BELONG TO?: NEVER
WITHIN THE LAST YEAR, HAVE YOU BEEN HUMILIATED OR EMOTIONALLY ABUSED IN OTHER WAYS BY YOUR PARTNER OR EX-PARTNER?: NO
WITHIN THE LAST YEAR, HAVE YOU BEEN KICKED, HIT, SLAPPED, OR OTHERWISE PHYSICALLY HURT BY YOUR PARTNER OR EX-PARTNER?: NO
WITHIN THE LAST YEAR, HAVE TO BEEN RAPED OR FORCED TO HAVE ANY KIND OF SEXUAL ACTIVITY BY YOUR PARTNER OR EX-PARTNER?: NO
HOW HARD IS IT FOR YOU TO PAY FOR THE VERY BASICS LIKE FOOD, HOUSING, MEDICAL CARE, AND HEATING?: NOT VERY HARD
WITHIN THE LAST YEAR, HAVE YOU BEEN AFRAID OF YOUR PARTNER OR EX-PARTNER?: NO
HOW OFTEN DO YOU ATTEND CHURCH OR RELIGIOUS SERVICES?: 1 TO 4 TIMES PER YEAR
DO YOU BELONG TO ANY CLUBS OR ORGANIZATIONS SUCH AS CHURCH GROUPS UNIONS, FRATERNAL OR ATHLETIC GROUPS, OR SCHOOL GROUPS?: NO
HOW OFTEN DO YOU GET TOGETHER WITH FRIENDS OR RELATIVES?: ONCE A WEEK
IN A TYPICAL WEEK, HOW MANY TIMES DO YOU TALK ON THE PHONE WITH FAMILY, FRIENDS, OR NEIGHBORS?: THREE TIMES A WEEK

## 2024-12-23 ASSESSMENT — PATIENT HEALTH QUESTIONNAIRE - PHQ9
2. FEELING DOWN, DEPRESSED OR HOPELESS: NOT AT ALL
1. LITTLE INTEREST OR PLEASURE IN DOING THINGS: NOT AT ALL
SUM OF ALL RESPONSES TO PHQ9 QUESTIONS 1 & 2: 0

## 2024-12-23 ASSESSMENT — LIFESTYLE VARIABLES
HOW OFTEN DO YOU HAVE A DRINK CONTAINING ALCOHOL: 2-4 TIMES A MONTH
HOW MANY STANDARD DRINKS CONTAINING ALCOHOL DO YOU HAVE ON A TYPICAL DAY: 1 OR 2
SKIP TO QUESTIONS 9-10: 1
HOW OFTEN DO YOU HAVE SIX OR MORE DRINKS ON ONE OCCASION: NEVER
AUDIT-C TOTAL SCORE: 2

## 2024-12-23 NOTE — PROGRESS NOTES
Patient returned my call and answered all the the SDOH. Patients question answered as to why we ask all of these questions. Reviewed upcoming appointments with patient and explained what to expect during the first visit.

## 2024-12-27 PROBLEM — C50.112 MALIGNANT NEOPLASM OF CENTRAL PORTION OF LEFT BREAST IN FEMALE, ESTROGEN RECEPTOR POSITIVE: Status: ACTIVE | Noted: 2024-12-27

## 2024-12-27 PROBLEM — Z17.0 MALIGNANT NEOPLASM OF CENTRAL PORTION OF LEFT BREAST IN FEMALE, ESTROGEN RECEPTOR POSITIVE: Status: ACTIVE | Noted: 2024-12-27

## 2024-12-27 ASSESSMENT — ENCOUNTER SYMPTOMS
EYES NEGATIVE: 1
ENDOCRINE NEGATIVE: 1
MUSCULOSKELETAL NEGATIVE: 1
CARDIOVASCULAR NEGATIVE: 1
HEMATOLOGIC/LYMPHATIC NEGATIVE: 1
GASTROINTESTINAL NEGATIVE: 1
PSYCHIATRIC NEGATIVE: 1
CONSTITUTIONAL NEGATIVE: 1
RESPIRATORY NEGATIVE: 1
NEUROLOGICAL NEGATIVE: 1

## 2024-12-27 NOTE — PROGRESS NOTES
Subjective     Diagnosis date: 24 left breast invasive ductal carcinoma, grade 2, ER >95% 3+, NV 40-50% 2-3+, HER2 1+, cT4bN1 likely M1, stage IV     Cancer Staging   Malignant neoplasm of central portion of left breast in female, estrogen receptor positive  Staging form: Breast, AJCC 8th Edition  - Clinical stage from 2024: Stage IV - Signed by Sidra Cook MD on 2024  cT4b  cN1  cM1  [place] grade: G2  ER Status: Positive  NV Status: Positive  HER2 Status: Negative       Iva DAVID Pollock is a 54 y.o. female who was referred by: Dr. Nancy Belcher  for evaluation of a palpable left breast cancer which has been present for at least 1 year. She presents to the [facility] for treatment recommendations. She proceeded to have diagnostic imaging demonstrating a mass at least 6cm in size at the 12 o'clock position with overlying skin changes and at least 1 abnormal axillary lymph node. The breast mass and lymph node underwent US-guided core biopsy revealing a diagnosis of invasive ductal carcinoma. CT abdomen/pelvis ordered by Dr. Belcher has concerns for bony metastasis. She has no family history of breast or ovarian cancers.    BREAST IMAGIN16 Bilateral screening mammogram demonstrates heterogeneously dense breast tissue, BI-RADS Category 1,   24 bilateral diagnostic mammogram and targeted ultrasound, indicates BI-RADS Category 5, at the site of concern, There is a large high density spiculated mass with protrusion out of the skin and associated skin thickening underneath the radiopaque marker. There is also a spiculated mass in the left axilla. No suspicious masses or calcifications are identified in the right breast.  In the left breast at 12 o'clock, 3 cm from the nipple in the patient's area of palpable concern, there is a large heterogeneous hypoechoic mass measuring at least 6.1cm which is stiff on elastography and demonstrates internal flow. Scanning of  the left axilla demonstrates an irregular lymph node with loss of fatty hilum and abnormal vascular flow with 3 additional morphologically normal lymph nodes are seen.    BREAST PROCEDURE: 24 left breast, 12:00 3cm from nipple, ultrasound-guided core biopsy and left axillary lymph node ultrasound-guided biopsy    REPRODUCTIVE HEALTH: menarche at 12, jefe-menopausal, , with no hormone exposure such as birth control pills or post menopausal estrogen    MEDICAL HISTORY: hypertension, hyperlipidemia, diabetes    FAMILY CANCER HISTORY:   Maternal uncle with throat cancer (smoker)    MEDICAL ONCOLOGY: Dr. Marinelli    RADIATION ONCOLOGY: referral post op if indicated    GENETICS: meets NCCN criteria with stage IV breast cancer    Cancer-related family history is not on file.    Review of Systems   Constitutional: Negative.    HENT: Negative.     Eyes: Negative.    Respiratory: Negative.     Cardiovascular: Negative.    Gastrointestinal: Negative.    Endocrine: Negative.    Genitourinary: Negative.    Musculoskeletal: Negative.    Skin: Negative.    Neurological: Negative.    Hematological: Negative.    Psychiatric/Behavioral: Negative.          Objective     Visit Vitals  BP (!) 148/96 (BP Location: Left arm, Patient Position: Sitting, BP Cuff Size: Small adult)   Pulse 71   Temp 36.3 °C (97.3 °F) (Temporal)   Wt 85.3 kg (188 lb)   BMI 33.30 kg/m²   OB Status Having periods   Smoking Status Never   BSA 1.95 m²        Breast: Exam performed in sitting and supine positions.   cup size: C  RIGHT BREAST EXAM:  Breast: no discrete mass  Skin Erythema: no  Peau d' orange: no  Nipple Inversion: no  Nipple Discharge: no     LEFT BREAST EXAM:  Breast: large 6cm mass occupying the central and UOQ with overlying skin attachment and invasion and near-obliteration of the NAC  Skin Erythema: yes  Attachment of Overlying Skin: yes  Peau d' orange: no  Chest Wall Attachment: no  Nipple Inversion: yes  Nipple Discharge: no      RIGHT FAIZA BASIN EXAM:  Axillary: negative  Infraclavicular: negative  Supraclavicular: negative     LEFT FAIZA BASIN EXAM:  Axillary: positive  Infraclavicular: negative  Supraclavicular: negative        General: Otherwise healthy appearing. Patient is in no acute distress.     HEENT: Conjunctivae are well colored and sclerae nonicteric. Neck is supple, trachea midline. No lymphadenopathy or thyromegaly.     Chest: Respiratory rate and effort normal. No cough.     CV: regular rate and rhythm.     Abdomen: Abdomen is non-tender to palpation, uterine fibroid is palpable     Extremities: Extremities are atraumatic. There is no evidence of lymphedema.    Shoulder abduction test: (raising affected arm(s) from lateral to 180 degrees overhead, one at a time) no limitations     Neurologic: Neurologically intact. Alert and oriented.        Imaging    Images from bilateral diagnostic mammogram and CT of the abdomen/pelvis were reviewed with breast imaging and images were shared with MsKerry Phill today.    Assessment and Plan    The natural history and evolution of breast cancer were discussed with Ms. Pollock and her partner Jose Luis, including the difference between in-situ and invasive carcinoma, and the distinction between local and systemic disease and local and systemic therapy. We also discussed the findings on the imaging ordered by Dr. Belcher, which is concerning for bony metastatic disease. We discussed that this is likely stage IV breast cancer. She is scheduled to meet with Dr. Marinelli in medical oncology on 1/6/25 to discuss this further and review her systemic therapy options.  We discussed how local treatment is reserved for local failure/unresponsive disease when there is stage IV breast cancer. If on additional work up, she does not have evidence of systemic disease, I would recommend neoadjuvant treatment with the role to shrink her local disease before proceeding with resection. Currently her cancer is  unresectable surgically. If she does not response to systemic therapy, there could be a role for palliative radiation.     Ms. Pollock meets NCCN criteria for genetic testing with stage IV breast cancer and this is scheduled for March 2025.    Following this discussion, where all of the patient's questions were answered, we agreed to proceed with consultation with Dr. Marinelli to review her systemic therapy options and need for further imaging/biopsy to confirm metastatic disease. No indications for surgery at this time, but happy to revisit particularly if additional systemic work up does not identify metastatic disease. She was provided with our contact information and encouraged to reach out with questions or concerns.    Sidra Cook MD

## 2024-12-31 ENCOUNTER — OFFICE VISIT (OUTPATIENT)
Dept: SURGICAL ONCOLOGY | Facility: CLINIC | Age: 54
End: 2024-12-31
Payer: COMMERCIAL

## 2024-12-31 VITALS
TEMPERATURE: 97.3 F | SYSTOLIC BLOOD PRESSURE: 148 MMHG | WEIGHT: 188 LBS | DIASTOLIC BLOOD PRESSURE: 96 MMHG | HEART RATE: 71 BPM | BODY MASS INDEX: 33.3 KG/M2

## 2024-12-31 DIAGNOSIS — C50.112 MALIGNANT NEOPLASM OF CENTRAL PORTION OF LEFT BREAST IN FEMALE, ESTROGEN RECEPTOR POSITIVE: Primary | ICD-10-CM

## 2024-12-31 DIAGNOSIS — Z17.0 MALIGNANT NEOPLASM OF CENTRAL PORTION OF LEFT BREAST IN FEMALE, ESTROGEN RECEPTOR POSITIVE: Primary | ICD-10-CM

## 2024-12-31 PROCEDURE — 3050F LDL-C >= 130 MG/DL: CPT | Performed by: SURGERY

## 2024-12-31 PROCEDURE — 3051F HG A1C>EQUAL 7.0%<8.0%: CPT | Performed by: SURGERY

## 2024-12-31 PROCEDURE — 3077F SYST BP >= 140 MM HG: CPT | Performed by: SURGERY

## 2024-12-31 PROCEDURE — 99215 OFFICE O/P EST HI 40 MIN: CPT | Performed by: SURGERY

## 2024-12-31 PROCEDURE — 3080F DIAST BP >= 90 MM HG: CPT | Performed by: SURGERY

## 2024-12-31 PROCEDURE — 4010F ACE/ARB THERAPY RXD/TAKEN: CPT | Performed by: SURGERY

## 2024-12-31 ASSESSMENT — PAIN SCALES - GENERAL: PAINLEVEL_OUTOF10: 3

## 2025-01-05 LAB — TEST COMMENT - SURGICAL SENDOUT REQUEST: NORMAL

## 2025-01-06 ENCOUNTER — OFFICE VISIT (OUTPATIENT)
Dept: HEMATOLOGY/ONCOLOGY | Facility: HOSPITAL | Age: 55
End: 2025-01-06
Payer: COMMERCIAL

## 2025-01-06 ENCOUNTER — LAB (OUTPATIENT)
Dept: LAB | Facility: HOSPITAL | Age: 55
End: 2025-01-06
Payer: COMMERCIAL

## 2025-01-06 ENCOUNTER — SOCIAL WORK (OUTPATIENT)
Dept: CASE MANAGEMENT | Facility: HOSPITAL | Age: 55
End: 2025-01-06
Payer: COMMERCIAL

## 2025-01-06 VITALS
HEIGHT: 63 IN | RESPIRATION RATE: 17 BRPM | TEMPERATURE: 99.1 F | SYSTOLIC BLOOD PRESSURE: 125 MMHG | OXYGEN SATURATION: 98 % | DIASTOLIC BLOOD PRESSURE: 83 MMHG | BODY MASS INDEX: 32.38 KG/M2 | HEART RATE: 84 BPM | WEIGHT: 182.76 LBS

## 2025-01-06 DIAGNOSIS — C50.112 MALIGNANT NEOPLASM OF CENTRAL PORTION OF LEFT BREAST IN FEMALE, ESTROGEN RECEPTOR POSITIVE: ICD-10-CM

## 2025-01-06 DIAGNOSIS — Z17.0 MALIGNANT NEOPLASM OF CENTRAL PORTION OF LEFT BREAST IN FEMALE, ESTROGEN RECEPTOR POSITIVE: ICD-10-CM

## 2025-01-06 DIAGNOSIS — C79.51 MALIGNANT NEOPLASM METASTATIC TO BONE (MULTI): ICD-10-CM

## 2025-01-06 DIAGNOSIS — Z17.0 MALIGNANT NEOPLASM OF CENTRAL PORTION OF LEFT BREAST IN FEMALE, ESTROGEN RECEPTOR POSITIVE: Primary | ICD-10-CM

## 2025-01-06 DIAGNOSIS — C50.112 MALIGNANT NEOPLASM OF CENTRAL PORTION OF LEFT BREAST IN FEMALE, ESTROGEN RECEPTOR POSITIVE: Primary | ICD-10-CM

## 2025-01-06 LAB
ALBUMIN SERPL BCP-MCNC: 4.7 G/DL (ref 3.4–5)
ALP SERPL-CCNC: 79 U/L (ref 33–110)
ALT SERPL W P-5'-P-CCNC: 16 U/L (ref 7–45)
ANION GAP SERPL CALC-SCNC: 16 MMOL/L (ref 10–20)
AST SERPL W P-5'-P-CCNC: 24 U/L (ref 9–39)
BASOPHILS # BLD AUTO: 0.05 X10*3/UL (ref 0–0.1)
BASOPHILS NFR BLD AUTO: 0.6 %
BILIRUB SERPL-MCNC: 0.8 MG/DL (ref 0–1.2)
BUN SERPL-MCNC: 15 MG/DL (ref 6–23)
CALCIUM SERPL-MCNC: 10.3 MG/DL (ref 8.6–10.3)
CANCER AG27-29 SERPL-ACNC: 93.2 U/ML (ref 0–38.6)
CHLORIDE SERPL-SCNC: 101 MMOL/L (ref 98–107)
CO2 SERPL-SCNC: 26 MMOL/L (ref 21–32)
CREAT SERPL-MCNC: 0.9 MG/DL (ref 0.5–1.05)
EGFRCR SERPLBLD CKD-EPI 2021: 76 ML/MIN/1.73M*2
EOSINOPHIL # BLD AUTO: 0.04 X10*3/UL (ref 0–0.7)
EOSINOPHIL NFR BLD AUTO: 0.5 %
ERYTHROCYTE [DISTWIDTH] IN BLOOD BY AUTOMATED COUNT: 13.3 % (ref 11.5–14.5)
ESTRADIOL SERPL-MCNC: 26 PG/ML
FSH SERPL-ACNC: 87.5 IU/L
GLUCOSE SERPL-MCNC: 117 MG/DL (ref 74–99)
HCT VFR BLD AUTO: 40.5 % (ref 36–46)
HGB BLD-MCNC: 13.2 G/DL (ref 12–16)
IMM GRANULOCYTES # BLD AUTO: 0.03 X10*3/UL (ref 0–0.7)
IMM GRANULOCYTES NFR BLD AUTO: 0.4 % (ref 0–0.9)
LH SERPL-ACNC: 34.9 IU/L
LYMPHOCYTES # BLD AUTO: 1.26 X10*3/UL (ref 1.2–4.8)
LYMPHOCYTES NFR BLD AUTO: 15.9 %
MCH RBC QN AUTO: 27.3 PG (ref 26–34)
MCHC RBC AUTO-ENTMCNC: 32.6 G/DL (ref 32–36)
MCV RBC AUTO: 84 FL (ref 80–100)
MONOCYTES # BLD AUTO: 0.49 X10*3/UL (ref 0.1–1)
MONOCYTES NFR BLD AUTO: 6.2 %
NEUTROPHILS # BLD AUTO: 6.04 X10*3/UL (ref 1.2–7.7)
NEUTROPHILS NFR BLD AUTO: 76.4 %
NRBC BLD-RTO: 0 /100 WBCS (ref 0–0)
PLATELET # BLD AUTO: 302 X10*3/UL (ref 150–450)
POTASSIUM SERPL-SCNC: 4.1 MMOL/L (ref 3.5–5.3)
PROT SERPL-MCNC: 7.9 G/DL (ref 6.4–8.2)
RBC # BLD AUTO: 4.83 X10*6/UL (ref 4–5.2)
SODIUM SERPL-SCNC: 139 MMOL/L (ref 136–145)
WBC # BLD AUTO: 7.9 X10*3/UL (ref 4.4–11.3)

## 2025-01-06 PROCEDURE — 3079F DIAST BP 80-89 MM HG: CPT | Performed by: INTERNAL MEDICINE

## 2025-01-06 PROCEDURE — 80053 COMPREHEN METABOLIC PANEL: CPT

## 2025-01-06 PROCEDURE — 99215 OFFICE O/P EST HI 40 MIN: CPT | Mod: 25 | Performed by: INTERNAL MEDICINE

## 2025-01-06 PROCEDURE — 4010F ACE/ARB THERAPY RXD/TAKEN: CPT | Performed by: INTERNAL MEDICINE

## 2025-01-06 PROCEDURE — 36415 COLL VENOUS BLD VENIPUNCTURE: CPT

## 2025-01-06 PROCEDURE — 3008F BODY MASS INDEX DOCD: CPT | Performed by: INTERNAL MEDICINE

## 2025-01-06 PROCEDURE — 82670 ASSAY OF TOTAL ESTRADIOL: CPT

## 2025-01-06 PROCEDURE — 83520 IMMUNOASSAY QUANT NOS NONAB: CPT

## 2025-01-06 PROCEDURE — 86300 IMMUNOASSAY TUMOR CA 15-3: CPT

## 2025-01-06 PROCEDURE — 83001 ASSAY OF GONADOTROPIN (FSH): CPT

## 2025-01-06 PROCEDURE — 85025 COMPLETE CBC W/AUTO DIFF WBC: CPT

## 2025-01-06 PROCEDURE — 99205 OFFICE O/P NEW HI 60 MIN: CPT | Performed by: INTERNAL MEDICINE

## 2025-01-06 PROCEDURE — 3074F SYST BP LT 130 MM HG: CPT | Performed by: INTERNAL MEDICINE

## 2025-01-06 RX ORDER — DIPHENHYDRAMINE HYDROCHLORIDE 50 MG/ML
50 INJECTION INTRAMUSCULAR; INTRAVENOUS AS NEEDED
OUTPATIENT
Start: 2025-01-20

## 2025-01-06 RX ORDER — HEPARIN 100 UNIT/ML
500 SYRINGE INTRAVENOUS AS NEEDED
OUTPATIENT
Start: 2025-01-06

## 2025-01-06 RX ORDER — EPINEPHRINE 0.3 MG/.3ML
0.3 INJECTION SUBCUTANEOUS EVERY 5 MIN PRN
OUTPATIENT
Start: 2025-01-20

## 2025-01-06 RX ORDER — ALBUTEROL SULFATE 0.83 MG/ML
3 SOLUTION RESPIRATORY (INHALATION) AS NEEDED
OUTPATIENT
Start: 2025-01-20

## 2025-01-06 RX ORDER — FAMOTIDINE 10 MG/ML
20 INJECTION INTRAVENOUS ONCE AS NEEDED
OUTPATIENT
Start: 2025-01-20

## 2025-01-06 RX ORDER — HEPARIN SODIUM,PORCINE/PF 10 UNIT/ML
50 SYRINGE (ML) INTRAVENOUS AS NEEDED
OUTPATIENT
Start: 2025-01-06

## 2025-01-06 RX ORDER — LIDOCAINE HYDROCHLORIDE 10 MG/ML
2 INJECTION, SOLUTION EPIDURAL; INFILTRATION; INTRACAUDAL; PERINEURAL ONCE
OUTPATIENT
Start: 2025-01-20

## 2025-01-06 ASSESSMENT — ENCOUNTER SYMPTOMS
SHORTNESS OF BREATH: 0
DIARRHEA: 0
DIAPHORESIS: 0
LEG SWELLING: 0
BACK PAIN: 0
HEMOPTYSIS: 0
BRUISES/BLEEDS EASILY: 0
DIZZINESS: 0
ARTHRALGIAS: 0
RESPIRATORY NEGATIVE: 1
PALPITATIONS: 0
DEPRESSION: 0
CONSTITUTIONAL NEGATIVE: 1
CONFUSION: 0
EXTREMITY WEAKNESS: 0
COUGH: 0
WHEEZING: 0
APPETITE CHANGE: 0
CARDIOVASCULAR NEGATIVE: 1
EYE PROBLEMS: 0
DYSURIA: 0
ABDOMINAL DISTENTION: 0
SCLERAL ICTERUS: 0
ABDOMINAL PAIN: 0
SEIZURES: 0
FATIGUE: 0
CHEST TIGHTNESS: 0
DIFFICULTY URINATING: 0
NAUSEA: 0
SLEEP DISTURBANCE: 0
CHILLS: 0
CONSTIPATION: 0
MYALGIAS: 0
FEVER: 0
BLOOD IN STOOL: 0
HOT FLASHES: 0
EYES NEGATIVE: 1
NERVOUS/ANXIOUS: 0
NUMBNESS: 0
HEADACHES: 0
FREQUENCY: 0
ADENOPATHY: 0
HEMATURIA: 0

## 2025-01-06 ASSESSMENT — PAIN SCALES - GENERAL: PAINLEVEL_OUTOF10: 0-NO PAIN

## 2025-01-06 NOTE — PROGRESS NOTES
New Patient visit. Patient is a pleasant 54 year old  female who resides with her spouse in [place], [place]. Her spouse is  accompanying her on this visit.  Patient is said to have a strong support system available to her. Patient is alert and oriented x 4, verbally communicative. Patient is employed as a  for a local company. Patient requesting social work intervention. Patient is exploring Services that  can provide to her as she navigates her illness .Sw provided patient with a list of services including assistance with medical paperwork, etc. Along with contact information. SW provided patient with brochure for the [facility]. SW will continue to follow supportively.    BOY Mcginnis

## 2025-01-06 NOTE — PATIENT INSTRUCTIONS
Bloodwork today  PET scan ASAP  Start goserelin injections this Friday   Plan is to start anti-estrogen therapy and ribociclib in the next couple of wks  Follow-up with me in 2 wks

## 2025-01-06 NOTE — PROGRESS NOTES
Breast Medical Oncology Clinic  Location: [facility]      BREAST CANCER DIAGNOSIS  Malignant neoplasm of central portion of left breast in female, estrogen receptor positive, Clinical: Stage IV (cT4b, cN1(f), cM1, G2, ER+, SD+, HER2-)         HISTORY OF PRESENT ILLNESS    Iva Pollock is a 54 y.o. woman with recent diagnosis of left sided breast cancer ER/SD+ with ulcerating mass which has been there for a while.  Initial staging scans showed some evidence of bone mets. Patient has noted non-radiating hip and back pain for the last 12 months. Not getting worse. Not every day. Alleviated by taking tylenol or ibuprofen. Has more pain in left breast.            Review of Systems   Constitutional: Negative.  Negative for appetite change, chills, diaphoresis, fatigue and fever.   HENT:  Negative.  Negative for hearing loss and lump/mass.    Eyes: Negative.  Negative for eye problems and icterus.   Respiratory: Negative.  Negative for chest tightness, cough, hemoptysis, shortness of breath and wheezing.    Cardiovascular: Negative.  Negative for chest pain, leg swelling and palpitations.   Gastrointestinal:  Negative for abdominal distention, abdominal pain, blood in stool, constipation, diarrhea and nausea.   Endocrine: Negative for hot flashes.   Genitourinary:  Positive for menstrual problem and vaginal bleeding (+heavy menstruation). Negative for bladder incontinence, difficulty urinating, dyspareunia, dysuria, frequency and hematuria.    Musculoskeletal:  Negative for arthralgias, back pain, gait problem and myalgias.        +back and hip pain non-radiating   Neurological:  Negative for dizziness, extremity weakness, gait problem, headaches, numbness and seizures.   Hematological:  Negative for adenopathy. Does not bruise/bleed easily.   Psychiatric/Behavioral:  Negative for confusion, depression and sleep disturbance. The patient is not nervous/anxious.    All other systems reviewed and are negative.        Past  "Medical History:  has a past medical history of Diabetes mellitus, type 2 (Multi), Gout, unspecified (2014), Other conditions influencing health status (2013), Other general symptoms and signs (2019), Pain in left shoulder (2016), Pain in right foot (2016), Pain in right hip (2016), Pain in unspecified foot (2014), Pneumonia, unspecified organism (2016), and Thrombocytopenia (CMS-[facility]).  Surgical History:   has a past surgical history that includes Appendectomy (2016) and Cholecystectomy.  Social History:   reports that she has never smoked. She has never used smokeless tobacco. She reports current alcohol use of about 1.0 standard drink of alcohol per week. She reports that she does not use drugs.  Family History:    Family History   Problem Relation Name Age of Onset    Diabetes Mother      Hypertension Father      Throat cancer Mother's Brother      Diabetes Maternal Grandmother      Diabetes Maternal Grandfather      Diabetes Paternal Grandmother      Diabetes Paternal Grandfather       Family Oncology History:  Cancer-related family history is not on file.      OBJECTIVE    VS / Pain:  /83   Pulse 84   Temp 37.3 °C (99.1 °F) (Temporal)   Resp 17   Ht (S) 1.601 m (5' 3.03\")   Wt 82.9 kg (182 lb 12.2 oz)   SpO2 98%   BMI 32.34 kg/m²   BSA: 1.92 meters squared   Pain Scale: 3    Performance Status:  The ECOG performance scale today is ECO- Fully active, able to carry on all pre-disease performance w/o restriction.          Physical Exam  Constitutional:       General: She is not in acute distress.     Appearance: She is not ill-appearing, toxic-appearing or diaphoretic.   HENT:      Nose: No congestion or rhinorrhea.      Mouth/Throat:      Pharynx: No posterior oropharyngeal erythema.   Cardiovascular:      Rate and Rhythm: Normal rate and regular rhythm.      Pulses: Normal pulses.      Heart sounds: Normal heart sounds. No murmur heard.     " No gallop.   Pulmonary:      Breath sounds: Normal breath sounds.   Chest:       Abdominal:      General: There is no distension.      Palpations: There is no mass.      Tenderness: There is no abdominal tenderness.   Musculoskeletal:         General: No swelling.      Cervical back: No rigidity.      Right lower leg: No edema.      Left lower leg: No edema.   Lymphadenopathy:      Cervical: No cervical adenopathy.   Skin:     General: Skin is warm.      Coloration: Skin is not cyanotic.      Findings: No bruising, ecchymosis or erythema.   Neurological:      General: No focal deficit present.      Mental Status: She is oriented to person, place, and time.      Cranial Nerves: Cranial nerves 2-12 are intact.      Motor: Motor function is intact.   Psychiatric:         Attention and Perception: Attention and perception normal.         Mood and Affect: Mood and affect normal.         Behavior: Behavior normal.         Thought Content: Thought content normal.         Judgment: Judgment normal.           Diagnostic Results   === 12/11/24 ===    CT ABDOMEN PELVIS W IV CONTRAST    - Impression -  Incompletely included necrotic left breast mass    No sign of active soft tissue metastatic disease in the included  lower chest. Note left axillary and some of the other thoracic lymph  node stations needing evaluation with breast cancer are not included  in the field of view on this exam of the abdomen. The included lower  chest on today's exam cannot serve as an initial staging exam for the  chest as it is not completely included in the field of view    Few lytic and few sclerotic osseous lesions, raising suspicion for  osseous metastatic disease from breast cancer as detailed above    No soft tissue metastatic disease in the abdomen or pelvis    Massive fibroid uterus extends up into the mid abdomen as detailed  above    The mass effect of the fibroid uterus is not causing hydronephrosis  on either  side    MACRO:  None    Signed by: Michael Robbins 12/11/2024 2:15 PM  Dictation workstation:   [id number]      BI mammo bilateral diagnostic tomosynthesis 12/11/2024  BI US breast limited left 12/11/2024    Narrative  Interpreted By:  Prasanth Corley,  STUDY:  BI US BREAST LIMITED LEFT; BI MAMMO BILATERAL DIAGNOSTIC  TOMOSYNTHESIS;  12/11/2024 11:49 am; 12/11/2024 10:37 am    ACCESSION NUMBER(S):  [id number]; [id number]    ORDERING CLINICIAN:  STEPHON JAMISON    INDICATION:  Patient presents for evaluation of an ulcerating left breast lump  with inversion of the nipple and spontaneous bloody nipple discharge.    ,N64.9 Disorder of breast, unspecified    COMPARISON:  11/21/2016    FINDINGS:  MAMMOGRAPHY: 2D and tomosynthesis images were reviewed at 1 mm slice  thickness.    Density:  The breasts are heterogeneously dense, which may obscure  small masses.    A radiopaque marker was placed on the skin at the site of the  patient's area of concern in the left upper outer breast. There is a  large high density spiculated mass with protrusion out of the skin  and associated skin thickening underneath the radiopaque marker.  There is also a spiculated mass in the left axilla.    No suspicious masses or calcifications are identified in the right  breast.    ULTRASOUND: Targeted ultrasound was performed of the left breast and  axilla by a registered sonographer with elastography. In the left  breast at 12 o'clock, 3 cm from the nipple in the patient's area of  palpable concern, there is a large heterogeneous hypoechoic mass  measuring at least 6.1 x 3.2 x 4.3 cm which is stiff on elastography  and demonstrates internal flow. Scanning of the left axilla  demonstrates an irregular lymph node with loss of fatty hilum and  abnormal vascular flow measuring 1.7 x 1.6 x 1.1 cm. 3 additional  morphologically normal lymph nodes are seen.    Impression  Highly suspicious ulcerating left breast mass with left  axillary  lymphadenopathy. Further evaluation with surgical consultation and  ultrasound-guided biopsy is recommended. Dr. Prasanth Corley explained the  findings and recommendations to the patient at the time of exam. A  message was sent to the referring practitioner at the time of this  dictation regarding these findings using the epic critical findings  reporting system. A pre-procedure form was filled out. Biopsy will be  subsequently performed this afternoon.    No mammographic evidence of malignancy in the right breast.    Method of Detection: Category Pat - Patient Reported Self-examination  Finding    BI-RADS CATEGORY:  BI-RADS CATEGORY:  5 Highly Suggestive of Malignancy.  Recommendation:  Surgical Consultation and Biopsy.  Recommended Date:  Immediate.  Laterality:  Left.    For any future breast imaging appointments, please call [phone] ([phone]).      MACRO:  Critical Finding:  Breast Imaging Abnormality. Notification was  initiated on 12/11/2024 at 3:54 pm by  Prasanth Corley.  (**-YCF-**)  Instructions:  Surgical Consultation and Imaging Guided Biopsy.    Signed by: Prasanth Corley 12/11/2024 3:54 PM  Dictation workstation:   [id number]     No images are attached to the encounter.    LABORATORY/PATHOLOGY DATA    Lab on 01/06/2025   Component Date Value Ref Range Status    WBC 01/06/2025 7.9  4.4 - 11.3 x10*3/uL Final    nRBC 01/06/2025 0.0  0.0 - 0.0 /100 WBCs Final    RBC 01/06/2025 4.83  4.00 - 5.20 x10*6/uL Final    Hemoglobin 01/06/2025 13.2  12.0 - 16.0 g/dL Final    Hematocrit 01/06/2025 40.5  36.0 - 46.0 % Final    MCV 01/06/2025 84  80 - 100 fL Final    MCH 01/06/2025 27.3  26.0 - 34.0 pg Final    MCHC 01/06/2025 32.6  32.0 - 36.0 g/dL Final    RDW 01/06/2025 13.3  11.5 - 14.5 % Final    Platelets 01/06/2025 302  150 - 450 x10*3/uL Final    Neutrophils % 01/06/2025 76.4  40.0 - 80.0 % Final    Immature Granulocytes %, Automated 01/06/2025 0.4  0.0 - 0.9 % Final    Lymphocytes % 01/06/2025 15.9  13.0 -  44.0 % Final    Monocytes % 01/06/2025 6.2  2.0 - 10.0 % Final    Eosinophils % 01/06/2025 0.5  0.0 - 6.0 % Final    Basophils % 01/06/2025 0.6  0.0 - 2.0 % Final    Neutrophils Absolute 01/06/2025 6.04  1.20 - 7.70 x10*3/uL Final    Immature Granulocytes Absolute, Au* 01/06/2025 0.03  0.00 - 0.70 x10*3/uL Final    Lymphocytes Absolute 01/06/2025 1.26  1.20 - 4.80 x10*3/uL Final    Monocytes Absolute 01/06/2025 0.49  0.10 - 1.00 x10*3/uL Final    Eosinophils Absolute 01/06/2025 0.04  0.00 - 0.70 x10*3/uL Final    Basophils Absolute 01/06/2025 0.05  0.00 - 0.10 x10*3/uL Final    Glucose 01/06/2025 117 (H)  74 - 99 mg/dL Final    Sodium 01/06/2025 139  136 - 145 mmol/L Final    Potassium 01/06/2025 4.1  3.5 - 5.3 mmol/L Final    Chloride 01/06/2025 101  98 - 107 mmol/L Final    Bicarbonate 01/06/2025 26  21 - 32 mmol/L Final    Anion Gap 01/06/2025 16  10 - 20 mmol/L Final    Urea Nitrogen 01/06/2025 15  6 - 23 mg/dL Final    Creatinine 01/06/2025 0.90  0.50 - 1.05 mg/dL Final    eGFR 01/06/2025 76  >60 mL/min/1.73m*2 Final    Calcium 01/06/2025 10.3  8.6 - 10.3 mg/dL Final    Albumin 01/06/2025 4.7  3.4 - 5.0 g/dL Final    Alkaline Phosphatase 01/06/2025 79  33 - 110 U/L Final    Total Protein 01/06/2025 7.9  6.4 - 8.2 g/dL Final    AST 01/06/2025 24  9 - 39 U/L Final    Bilirubin, Total 01/06/2025 0.8  0.0 - 1.2 mg/dL Final    ALT 01/06/2025 16  7 - 45 U/L Final   Office Visit on 01/06/2025   Component Date Value Ref Range Status    Test Comment 01/05/2025 The test request has been received by the lab and will be reviewed.   Final   Hospital Outpatient Visit on 12/11/2024   Component Date Value Ref Range Status    Case Report 12/11/2024    Final                    Value:Surgical Pathology                                Case: [id number]                                  Authorizing Provider:  OKSANA Elliott  Collected:           12/11/2024 1304              Ordering Location:     [facility]        Received:            2024 [address] [place] [address]                                                                       Pathologist:           Kalie Sood MD                                                           Specimens:   A) - BREAST CORE BIOPSY LEFT, Left breast 12:00 3 cmfn                                              B) - AXILLARY LYMPH NODE BIOPSY LEFT - BREAST, Left Axillary Lymph Node                    FINAL DIAGNOSIS 2024    Final                    Value:A. Left breast mass, 12:00, 3 cm from nipple, ultrasound guided core needle biopsy:  -- Invasive ductal carcinoma, grade 2, see note.    Note:  In this limited sample, the invasive carcinoma measures up to 7 mm in greatest dimension.  ER, PA and HER2 will be reported in an addendum.    B. Left axillary node, ultrasound guided core needle biopsy:  -- Invasive ductal carcinoma involving fibrous and fibroadipose tissue, see note.    Note: Invasive carcinoma involves predominantly fibrous and fibroadipose tissue. While there are few lymphocytes present, a definitive lymph node architecture is not identified. This may represent a lymph completely involved by carcinoma. Clinical and radiologic correlation is recommended.            2024    Final                    Value:By the signature on this report, the individual or group listed as making the Final Interpretation/Diagnosis certifies that they have reviewed this case.       Addendum 2024    Final                    Value:Surgical/Block Number: [id number]  Specimen Site: Left breast mass  Specimen Type: core needle biopsy      Estrogen Receptor (clone SP1):  Positive         Percentage with nuclear staining: >95%        Intensity of staining: Strong    Progesterone Receptor (clone SP2): Positive  Percentage with nuclear stainin-50%        Intensity of staining: Moderate-Strong    HER2 (clone 4B5):            Negative (1+)          Comment:      ASCO/CAP guidelines for ischemic times are met for this sample.   Due to the absence of normal breast parenchyma, no internal positive control staining was identified.  External control tissue stains appropriately.      For ER: Ranges for interpretation: Invasive carcinoma cells exhibiting greater than or equal to 10% nuclear staining are considered POSITIVE. Invasive carcinoma cells exhibiting less than 10%, but greater than or equal to 1% are considered LOW POSITIVE. Invasive carcinoma cells exhibiting less than 1% staining are considered NEGATIVE.                           (Reference: Arch Pathol Lab Med. doi:10.5858/arpa. [id number]) The stated steroid receptor activity for ER was derived from rabbit monoclonal antibody staining (clone SP1) on formalin fixed, paraffin embedded specimens, unless otherwise noted.  Each assay is performed using appropriate positive and negative internal controls.    For ME: Ranges for interpretation: Invasive carcinoma cells exhibiting greater than or equal to 1% nuclear staining are considered POSITIVE.  Invasive carcinoma cells exhibiting less than 1% staining are considered NEGATIVE. (Reference: Arch Pathol Lab Med. doi:10.5858/arpa. 1704-1456-2J) The stated steroid receptor activity for ME was derived from rabbit monoclonal antibody staining (clone 1E2) on formalin fixed, paraffin embedded specimens, unless otherwise noted.  The method employed was a standard peroxidase labeled polymer detection system. Each assay is performed using appropriate positive and negative internal controls.    For HER2: Ranges for                           interpretation: POSITIVE (3+): greater than or equal to 10% tumor cells with intense and uniform staining; EQUIVOCAL (2+): weak to moderate complete immunoreactivity in >10% of tumor cells or circumferential intense staining in less than or equal to 10% of cells; and NEGATIVE (1+): Faint weak immunoreactivity in >10% of tumor cells, but only a  "portion of the membrane is positive; NEGATIVE (0):No immunoreactivity or immunoreactivity in less than or equal to 10% of tumor cells. All tests are performed using a [employer] HER-2/rory (4B5) rabbit monoclonal primary antibody on formalin fixed, paraffin embedded tissue, unless otherwise noted. Only invasive carcinoma is evaluated using the ASCO/CAP scoring system (Arch Pathol Lab Med 2018; 142: 3654-5792) unless otherwise specified.  External cell culture and tissue controls stain appropriately.       Clinical History 12/11/2024    Final                    Value:Ultrasound Guided Core Biopsy Left Breast Mass 12:00 3 cm fn    Gross Description 12/11/2024    Final                    Value:A: Received in formalin, labeled with the patient´s name and hospital number and \"left breast 12:00 3 cm FN\", are multiple irregular/cylindrical segments of yellow-white fatty soft tissue aggregating to 14.2 x 0.5 x 0.2 cm.  The specimen is submitted in toto in two cassettes.  RCC    NOTE:  Ischemia time: 12/11/24 at 1:04.  This specimen was placed into formalin at: 12/11/24 1:20 p.  B: Received in formalin, labeled with the patient´s name and hospital number and \"left axillary lymph node\", are multiple irregular/cylindrical segments of yellow-white fatty soft tissue aggregating to 1.4 x 0.3 x 0.2 cm.  The specimen is submitted in toto in one cassette.  RCC      Disclaimer 12/11/2024    Final                    Value:One or more of the reagents used to perform assays on this specimen MAY have contained components considered to be analyte specific reagents (ASR's).  ASR's have not been cleared or approved by the U.S. Food and Drug Administration.  These assays were developed and their performance characteristics determined by the Department of Pathology at [facility]. The FDA does not require this test to go through premarket FDA review. This test is used for clinical purposes. It should not " be regarded as investigational or for research. This laboratory is certified under the Clinical Laboratory Improvement Amendments (CLIA) as qualified to perform high complexity clinical laboratory testing.  The assays were performed with appropriate positive and negative controls which stained appropriately.     Hospital Outpatient Visit on 12/11/2024   Component Date Value Ref Range Status    Case Report 12/11/2024    Final                    Value:Surgical Pathology                                Case: [id number]                                  Authorizing Provider:  TAMIKO Elliott-CNP  Collected:           12/11/2024 1304              Ordering Location:     [facility]       Received:            12/11/2024 1436                                     Center                                                                       Pathologist:           Kalie Sood MD                                                           Specimens:   A) - BREAST CORE BIOPSY LEFT, Left breast 12:00 3 cmfn                                              B) - AXILLARY LYMPH NODE BIOPSY LEFT - BREAST, Left Axillary Lymph Node                    FINAL DIAGNOSIS 12/11/2024    Final                    Value:A. Left breast mass, 12:00, 3 cm from nipple, ultrasound guided core needle biopsy:  -- Invasive ductal carcinoma, grade 2, see note.    Note:  In this limited sample, the invasive carcinoma measures up to 7 mm in greatest dimension.  ER, PA and HER2 will be reported in an addendum.    B. Left axillary node, ultrasound guided core needle biopsy:  -- Invasive ductal carcinoma involving fibrous and fibroadipose tissue, see note.    Note: Invasive carcinoma involves predominantly fibrous and fibroadipose tissue. While there are few lymphocytes present, a definitive lymph node architecture is not identified. This may represent a lymph completely involved by carcinoma. Clinical and radiologic correlation is recommended.             2024    Final                    Value:By the signature on this report, the individual or group listed as making the Final Interpretation/Diagnosis certifies that they have reviewed this case.       Addendum 2024    Final                    Value:Surgical/Block Number: [id number]  Specimen Site: Left breast mass  Specimen Type: core needle biopsy      Estrogen Receptor (clone SP1):  Positive         Percentage with nuclear staining: >95%        Intensity of staining: Strong    Progesterone Receptor (clone SP2): Positive  Percentage with nuclear stainin-50%        Intensity of staining: Moderate-Strong    HER2 (clone 4B5):            Negative (1+)          Comment:     ASCO/CAP guidelines for ischemic times are met for this sample.   Due to the absence of normal breast parenchyma, no internal positive control staining was identified.  External control tissue stains appropriately.      For ER: Ranges for interpretation: Invasive carcinoma cells exhibiting greater than or equal to 10% nuclear staining are considered POSITIVE. Invasive carcinoma cells exhibiting less than 10%, but greater than or equal to 1% are considered LOW POSITIVE. Invasive carcinoma cells exhibiting less than 1% staining are considered NEGATIVE.                           (Reference: Arch Pathol Lab Med. doi:10.5858/arpa. [id number]) The stated steroid receptor activity for ER was derived from rabbit monoclonal antibody staining (clone SP1) on formalin fixed, paraffin embedded specimens, unless otherwise noted.  Each assay is performed using appropriate positive and negative internal controls.    For ME: Ranges for interpretation: Invasive carcinoma cells exhibiting greater than or equal to 1% nuclear staining are considered POSITIVE.  Invasive carcinoma cells exhibiting less than 1% staining are considered NEGATIVE. (Reference: Arch Pathol Lab Med. doi:10.5858/arpa. 7530-0228-8J) The stated steroid receptor activity for  "PA was derived from rabbit monoclonal antibody staining (clone 1E2) on formalin fixed, paraffin embedded specimens, unless otherwise noted.  The method employed was a standard peroxidase labeled polymer detection system. Each assay is performed using appropriate positive and negative internal controls.    For HER2: Ranges for                           interpretation: POSITIVE (3+): greater than or equal to 10% tumor cells with intense and uniform staining; EQUIVOCAL (2+): weak to moderate complete immunoreactivity in >10% of tumor cells or circumferential intense staining in less than or equal to 10% of cells; and NEGATIVE (1+): Faint weak immunoreactivity in >10% of tumor cells, but only a portion of the membrane is positive; NEGATIVE (0):No immunoreactivity or immunoreactivity in less than or equal to 10% of tumor cells. All tests are performed using a [employer] HER-2/rory (4B5) rabbit monoclonal primary antibody on formalin fixed, paraffin embedded tissue, unless otherwise noted. Only invasive carcinoma is evaluated using the ASCO/CAP scoring system (Arch Pathol Lab Med 2018; 142: 4887-7102) unless otherwise specified.  External cell culture and tissue controls stain appropriately.       Clinical History 12/11/2024    Final                    Value:Ultrasound Guided Core Biopsy Left Breast Mass 12:00 3 cm fn    Gross Description 12/11/2024    Final                    Value:A: Received in formalin, labeled with the patient´s name and hospital number and \"left breast 12:00 3 cm FN\", are multiple irregular/cylindrical segments of yellow-white fatty soft tissue aggregating to 14.2 x 0.5 x 0.2 cm.  The specimen is submitted in toto in two cassettes.  RCC    NOTE:  Ischemia time: 12/11/24 at 1:04.  This specimen was placed into formalin at: 12/11/24 1:20 p.  B: Received in formalin, labeled with the patient´s name and hospital number and \"left axillary lymph node\", are multiple irregular/cylindrical segments of " yellow-white fatty soft tissue aggregating to 1.4 x 0.3 x 0.2 cm.  The specimen is submitted in toto in one cassette.  RCC      Disclaimer 12/11/2024    Final                    Value:One or more of the reagents used to perform assays on this specimen MAY have contained components considered to be analyte specific reagents (ASR's).  ASR's have not been cleared or approved by the U.S. Food and Drug Administration.  These assays were developed and their performance characteristics determined by the Department of Pathology at [facility]. The FDA does not require this test to go through premarket FDA review. This test is used for clinical purposes. It should not be regarded as investigational or for research. This laboratory is certified under the Clinical Laboratory Improvement Amendments (CLIA) as qualified to perform high complexity clinical laboratory testing.  The assays were performed with appropriate positive and negative controls which stained appropriately.     Lab on 12/10/2024   Component Date Value Ref Range Status    WBC 12/10/2024 5.9  4.4 - 11.3 x10*3/uL Final    nRBC 12/10/2024 0.0  0.0 - 0.0 /100 WBCs Final    RBC 12/10/2024 5.06  4.00 - 5.20 x10*6/uL Final    Hemoglobin 12/10/2024 13.6  12.0 - 16.0 g/dL Final    Hematocrit 12/10/2024 42.8  36.0 - 46.0 % Final    MCV 12/10/2024 85  80 - 100 fL Final    MCH 12/10/2024 26.9  26.0 - 34.0 pg Final    MCHC 12/10/2024 31.8 (L)  32.0 - 36.0 g/dL Final    RDW 12/10/2024 12.6  11.5 - 14.5 % Final    Platelets 12/10/2024 282  150 - 450 x10*3/uL Final    Neutrophils % 12/10/2024 73.2  40.0 - 80.0 % Final    Immature Granulocytes %, Automated 12/10/2024 0.3  0.0 - 0.9 % Final    Lymphocytes % 12/10/2024 20.4  13.0 - 44.0 % Final    Monocytes % 12/10/2024 4.6  2.0 - 10.0 % Final    Eosinophils % 12/10/2024 1.0  0.0 - 6.0 % Final    Basophils % 12/10/2024 0.5  0.0 - 2.0 % Final    Neutrophils Absolute 12/10/2024 4.30  1.20 - 7.70  x10*3/uL Final    Immature Granulocytes Absolute, Au* 12/10/2024 0.02  0.00 - 0.70 x10*3/uL Final    Lymphocytes Absolute 12/10/2024 1.20  1.20 - 4.80 x10*3/uL Final    Monocytes Absolute 12/10/2024 0.27  0.10 - 1.00 x10*3/uL Final    Eosinophils Absolute 12/10/2024 0.06  0.00 - 0.70 x10*3/uL Final    Basophils Absolute 12/10/2024 0.03  0.00 - 0.10 x10*3/uL Final    Glucose 12/10/2024 111 (H)  74 - 99 mg/dL Final    Sodium 12/10/2024 143  136 - 145 mmol/L Final    Potassium 12/10/2024 4.3  3.5 - 5.3 mmol/L Final    Chloride 12/10/2024 103  98 - 107 mmol/L Final    Bicarbonate 12/10/2024 29  21 - 32 mmol/L Final    Anion Gap 12/10/2024 15  10 - 20 mmol/L Final    Urea Nitrogen 12/10/2024 10  6 - 23 mg/dL Final    Creatinine 12/10/2024 0.85  0.50 - 1.05 mg/dL Final    eGFR 12/10/2024 81  >60 mL/min/1.73m*2 Final    Calcium 12/10/2024 9.6  8.6 - 10.6 mg/dL Final    Albumin 12/10/2024 4.3  3.4 - 5.0 g/dL Final    Alkaline Phosphatase 12/10/2024 72  33 - 110 U/L Final    Total Protein 12/10/2024 7.4  6.4 - 8.2 g/dL Final    AST 12/10/2024 20  9 - 39 U/L Final    Bilirubin, Total 12/10/2024 0.7  0.0 - 1.2 mg/dL Final    ALT 12/10/2024 16  7 - 45 U/L Final    Cholesterol 12/10/2024 230 (H)  0 - 199 mg/dL Final    HDL-Cholesterol 12/10/2024 50.4  mg/dL Final    Cholesterol/HDL Ratio 12/10/2024 4.6   Final    LDL Calculated 12/10/2024 138 (H)  <=99 mg/dL Final    VLDL 12/10/2024 42 (H)  0 - 40 mg/dL Final    Triglycerides 12/10/2024 208 (H)  0 - 149 mg/dL Final    Non HDL Cholesterol 12/10/2024 180 (H)  0 - 149 mg/dL Final    Thyroid Stimulating Hormone 12/10/2024 2.55  0.44 - 3.98 mIU/L Final    Vitamin D, 25-Hydroxy, Total 12/10/2024 21 (L)  30 - 100 ng/mL Final    Hemoglobin A1C 12/10/2024 7.1 (H)  See comment % Final    Estimated Average Glucose 12/10/2024 157  Not Established mg/dL Final            IMPRESSION/PLAN    Stage 4 ER+/ME+/HER2- breast cancer with evidence of bone mets on CT. Plan is to order [facility] PET/CT,  tempus blood, send NGS from left breast biopsy, and basic labs as well as lh/fsh/estradiol. Will start goserelin this wk. Will see her back in 2 wks time to start anastrozole/ribociclib. If [facility] PET Ct confirms bone mets then would start her on q3 month zometa.      We discussed the clinical significance of diagnosis, goals of care and treatment plan in detail.     I personally spent over half of a total 65  minutes face to face with the patient in counseling and discussion and/or coordination of care as described above.         -------------------------------------------------------------------------------------------------------------------------------  Ivan Marinelli MD  Director of Breast Cancer Medical Oncology Research Program   [facility]  Professor of Medicine  [facility] [address]  [place] [address]  Phone: [phone]  [email or web address]

## 2025-01-09 ENCOUNTER — TELEPHONE (OUTPATIENT)
Dept: HEMATOLOGY/ONCOLOGY | Facility: HOSPITAL | Age: 55
End: 2025-01-09
Payer: COMMERCIAL

## 2025-01-09 ENCOUNTER — HOSPITAL ENCOUNTER (OUTPATIENT)
Dept: RADIOLOGY | Facility: HOSPITAL | Age: 55
Discharge: HOME | End: 2025-01-09
Payer: COMMERCIAL

## 2025-01-09 DIAGNOSIS — Z17.0 MALIGNANT NEOPLASM OF CENTRAL PORTION OF LEFT BREAST IN FEMALE, ESTROGEN RECEPTOR POSITIVE: ICD-10-CM

## 2025-01-09 DIAGNOSIS — C50.112 MALIGNANT NEOPLASM OF CENTRAL PORTION OF LEFT BREAST IN FEMALE, ESTROGEN RECEPTOR POSITIVE: ICD-10-CM

## 2025-01-09 LAB — MIS SERPL-MCNC: <0.003 NG/ML

## 2025-01-09 PROCEDURE — A9591 FLUOROESTRADIOL F 18: HCPCS | Performed by: INTERNAL MEDICINE

## 2025-01-09 PROCEDURE — 78815 PET IMAGE W/CT SKULL-THIGH: CPT | Mod: PI

## 2025-01-09 PROCEDURE — 3430000001 HC RX 343 DIAGNOSTIC RADIOPHARMACEUTICALS: Performed by: INTERNAL MEDICINE

## 2025-01-09 RX ADMIN — FLUOROESTRADIOL F 18 4.9 MILLICURIE: 100 INJECTION INTRAVENOUS at 13:05

## 2025-01-09 NOTE — TELEPHONE ENCOUNTER
Left message informing patient that her infusion for tomorrow has been cancelled as it has not yet been approved by insurance. She does not need to report to the hospital tomorrow 1/10/2025 at 8 am. I will get the infusion rescheduled and communicate with her the new date.

## 2025-01-09 NOTE — TELEPHONE ENCOUNTER
Patient called back and is aware.    She plans to be out of town next week.    She will be back starting on the 20th.      The [phone] is her cell and you can leave a message on the cell as needed.

## 2025-01-10 ENCOUNTER — APPOINTMENT (OUTPATIENT)
Dept: HEMATOLOGY/ONCOLOGY | Facility: HOSPITAL | Age: 55
End: 2025-01-10
Payer: COMMERCIAL

## 2025-01-10 ENCOUNTER — SPECIALTY PHARMACY (OUTPATIENT)
Dept: PHARMACY | Facility: CLINIC | Age: 55
End: 2025-01-10

## 2025-01-10 ENCOUNTER — TELEPHONE (OUTPATIENT)
Dept: HEMATOLOGY/ONCOLOGY | Facility: HOSPITAL | Age: 55
End: 2025-01-10
Payer: COMMERCIAL

## 2025-01-10 RX ORDER — ANASTROZOLE 1 MG/1
1 TABLET ORAL DAILY
Qty: 30 TABLET | Refills: 6 | Status: SHIPPED | OUTPATIENT
Start: 2025-01-10 | End: 2026-01-10

## 2025-01-10 NOTE — TELEPHONE ENCOUNTER
I called patient and we went over cerianna pet scan and that it confirms bone metastases. Also we discussed that labs show hormonal levels c/w menopause but since LMP was in October, will proceed with goserelin next wk. I did tell her that she doesn't have to wait until goserelin shot to start anastrozole-- to start on Monday. And to start ribo when specialty pharmacy sends it

## 2025-01-13 LAB
DNA RANGE(S) EXAMINED NAR: NORMAL
GENE DIS ANL INTERP-IMP: POSITIVE
GENE DIS ASSESSED: NORMAL
REASON FOR STUDY: NORMAL
TEMPUS BLOOD TUMOR MUTATIONAL BURDEN: 13 M/MB
TEMPUS LCA: NORMAL
TEMPUS MSI NOTE: NORMAL
TEMPUS PORTAL: NORMAL
TEMPUS THERAPY1: NORMAL
TEMPUS THERAPY2: NORMAL
TEMPUS THERAPY3: NORMAL
TEMPUS THERAPY4: NORMAL
TEMPUS THERAPYCOUNT: 4
TEMPUS TRIALCOUNT: 3
TEMPUS TRIALMATCHES1: NORMAL
TEMPUS TRIALMATCHES2: NORMAL
TEMPUS TRIALMATCHES3: NORMAL

## 2025-01-15 LAB
ELECTRONICALLY SIGNED BY: NORMAL
FOCUSED SOLID TUMOR DNA RESULTS: NORMAL

## 2025-01-17 DIAGNOSIS — Z17.0 MALIGNANT NEOPLASM OF CENTRAL PORTION OF LEFT BREAST IN FEMALE, ESTROGEN RECEPTOR POSITIVE: ICD-10-CM

## 2025-01-17 DIAGNOSIS — C50.112 MALIGNANT NEOPLASM OF CENTRAL PORTION OF LEFT BREAST IN FEMALE, ESTROGEN RECEPTOR POSITIVE: ICD-10-CM

## 2025-01-17 DIAGNOSIS — C79.51 MALIGNANT NEOPLASM METASTATIC TO BONE (MULTI): ICD-10-CM

## 2025-01-20 ENCOUNTER — OFFICE VISIT (OUTPATIENT)
Dept: HEMATOLOGY/ONCOLOGY | Facility: HOSPITAL | Age: 55
End: 2025-01-20
Payer: COMMERCIAL

## 2025-01-20 ENCOUNTER — INFUSION (OUTPATIENT)
Dept: HEMATOLOGY/ONCOLOGY | Facility: HOSPITAL | Age: 55
End: 2025-01-20
Payer: COMMERCIAL

## 2025-01-20 VITALS
SYSTOLIC BLOOD PRESSURE: 136 MMHG | HEART RATE: 67 BPM | BODY MASS INDEX: 33.01 KG/M2 | RESPIRATION RATE: 18 BRPM | OXYGEN SATURATION: 98 % | DIASTOLIC BLOOD PRESSURE: 90 MMHG | WEIGHT: 186.29 LBS | TEMPERATURE: 97.9 F | HEIGHT: 63 IN

## 2025-01-20 DIAGNOSIS — C50.112 MALIGNANT NEOPLASM OF CENTRAL PORTION OF LEFT BREAST IN FEMALE, ESTROGEN RECEPTOR POSITIVE: ICD-10-CM

## 2025-01-20 DIAGNOSIS — Z17.0 MALIGNANT NEOPLASM OF CENTRAL PORTION OF LEFT BREAST IN FEMALE, ESTROGEN RECEPTOR POSITIVE: ICD-10-CM

## 2025-01-20 DIAGNOSIS — C79.51 MALIGNANT NEOPLASM METASTATIC TO BONE (MULTI): Primary | ICD-10-CM

## 2025-01-20 DIAGNOSIS — G89.3 NEOPLASM RELATED PAIN: ICD-10-CM

## 2025-01-20 PROCEDURE — 96372 THER/PROPH/DIAG INJ SC/IM: CPT

## 2025-01-20 PROCEDURE — 3008F BODY MASS INDEX DOCD: CPT | Performed by: INTERNAL MEDICINE

## 2025-01-20 PROCEDURE — 2500000004 HC RX 250 GENERAL PHARMACY W/ HCPCS (ALT 636 FOR OP/ED): Performed by: INTERNAL MEDICINE

## 2025-01-20 PROCEDURE — 4010F ACE/ARB THERAPY RXD/TAKEN: CPT | Performed by: INTERNAL MEDICINE

## 2025-01-20 PROCEDURE — 3080F DIAST BP >= 90 MM HG: CPT | Performed by: INTERNAL MEDICINE

## 2025-01-20 PROCEDURE — 99215 OFFICE O/P EST HI 40 MIN: CPT | Performed by: INTERNAL MEDICINE

## 2025-01-20 PROCEDURE — 96401 CHEMO ANTI-NEOPL SQ/IM: CPT

## 2025-01-20 PROCEDURE — 3075F SYST BP GE 130 - 139MM HG: CPT | Performed by: INTERNAL MEDICINE

## 2025-01-20 RX ORDER — FAMOTIDINE 10 MG/ML
20 INJECTION INTRAVENOUS ONCE AS NEEDED
OUTPATIENT
Start: 2025-02-17

## 2025-01-20 RX ORDER — LIDOCAINE HYDROCHLORIDE 10 MG/ML
2 INJECTION, SOLUTION EPIDURAL; INFILTRATION; INTRACAUDAL; PERINEURAL ONCE
Status: COMPLETED | OUTPATIENT
Start: 2025-01-20 | End: 2025-01-20

## 2025-01-20 RX ORDER — EPINEPHRINE 0.3 MG/.3ML
0.3 INJECTION SUBCUTANEOUS EVERY 5 MIN PRN
OUTPATIENT
Start: 2025-04-14

## 2025-01-20 RX ORDER — LIDOCAINE HYDROCHLORIDE 10 MG/ML
2 INJECTION, SOLUTION EPIDURAL; INFILTRATION; INTRACAUDAL; PERINEURAL ONCE
OUTPATIENT
Start: 2025-04-14

## 2025-01-20 RX ORDER — DIPHENHYDRAMINE HYDROCHLORIDE 50 MG/ML
50 INJECTION INTRAMUSCULAR; INTRAVENOUS AS NEEDED
OUTPATIENT
Start: 2025-04-14

## 2025-01-20 RX ORDER — ALBUTEROL SULFATE 0.83 MG/ML
3 SOLUTION RESPIRATORY (INHALATION) AS NEEDED
OUTPATIENT
Start: 2025-04-14

## 2025-01-20 RX ORDER — DIPHENHYDRAMINE HYDROCHLORIDE 50 MG/ML
50 INJECTION INTRAMUSCULAR; INTRAVENOUS AS NEEDED
OUTPATIENT
Start: 2025-02-17

## 2025-01-20 RX ORDER — ALBUTEROL SULFATE 0.83 MG/ML
3 SOLUTION RESPIRATORY (INHALATION) AS NEEDED
OUTPATIENT
Start: 2025-02-17

## 2025-01-20 RX ORDER — EPINEPHRINE 0.3 MG/.3ML
0.3 INJECTION SUBCUTANEOUS EVERY 5 MIN PRN
OUTPATIENT
Start: 2025-02-17

## 2025-01-20 RX ORDER — FAMOTIDINE 10 MG/ML
20 INJECTION INTRAVENOUS ONCE AS NEEDED
OUTPATIENT
Start: 2025-04-14

## 2025-01-20 RX ADMIN — LIDOCAINE HYDROCHLORIDE 20 MG: 10 INJECTION, SOLUTION EPIDURAL; INFILTRATION; INTRACAUDAL; PERINEURAL at 09:32

## 2025-01-20 RX ADMIN — GOSERELIN ACETATE 10.8 MG: 10.8 IMPLANT SUBCUTANEOUS at 09:31

## 2025-01-20 ASSESSMENT — ENCOUNTER SYMPTOMS
ADENOPATHY: 0
NERVOUS/ANXIOUS: 0
RESPIRATORY NEGATIVE: 1
CONSTIPATION: 0
LEG SWELLING: 0
CONFUSION: 0
DEPRESSION: 0
BRUISES/BLEEDS EASILY: 0
WHEEZING: 0
EYES NEGATIVE: 1
EXTREMITY WEAKNESS: 0
DIARRHEA: 0
SEIZURES: 0
ABDOMINAL PAIN: 0
HEADACHES: 0
PALPITATIONS: 0
FEVER: 0
DIFFICULTY URINATING: 0
MYALGIAS: 0
SCLERAL ICTERUS: 0
APPETITE CHANGE: 0
HEMATURIA: 0
FREQUENCY: 0
NAUSEA: 0
DYSURIA: 0
ABDOMINAL DISTENTION: 0
SHORTNESS OF BREATH: 0
DIZZINESS: 0
CHEST TIGHTNESS: 0
FATIGUE: 0
NUMBNESS: 0
SLEEP DISTURBANCE: 0
HOT FLASHES: 0
DIAPHORESIS: 0
CONSTITUTIONAL NEGATIVE: 1
CARDIOVASCULAR NEGATIVE: 1
BLOOD IN STOOL: 0
HEMOPTYSIS: 0
BACK PAIN: 0
COUGH: 0
EYE PROBLEMS: 0
ARTHRALGIAS: 0
CHILLS: 0

## 2025-01-20 ASSESSMENT — NCCN CANCER DISTRESS MANAGEMENT
NCCN PHYSICAL CONCERNS: 3
NCCN PRACTICAL CONCERNS: 7
NCCN PRACTICAL CONCERNS: 6
NCCN PHYSICAL CONCERNS: 1
NCCN PRACTICAL CONCERNS: 3

## 2025-01-20 ASSESSMENT — PAIN SCALES - GENERAL: PAINLEVEL_OUTOF10: 0-NO PAIN

## 2025-01-20 NOTE — PROGRESS NOTES
[facility]  Location: [facility]      BREAST CANCER DIAGNOSIS  Malignant neoplasm of central portion of left breast in female, estrogen receptor positive, Clinical: Stage IV (cT4b, cN1(f), cM1, G2, ER+, LA+, HER2-)         CURRENT THERAPY  Anastrozole since 1/13/25, first goserelin today and ribociclib is still in process of being sent    HISTORY OF PRESENT ILLNESS    Iva Pollock is a 54 y.o. woman with recent diagnosis of left sided breast cancer ER/LA+ with ulcerating mass which has been there for a while.  Initial staging scans showed some evidence of bone mets and confirmed by cerianna estradiol pet. Patient has noted non-radiating hip and back pain for the last 12 months. Not getting worse. Not every day. Alleviated by taking tylenol or ibuprofen. Pain in left breast stable            Review of Systems   Constitutional: Negative.  Negative for appetite change, chills, diaphoresis, fatigue and fever.   HENT:  Negative.  Negative for hearing loss and lump/mass.    Eyes: Negative.  Negative for eye problems and icterus.   Respiratory: Negative.  Negative for chest tightness, cough, hemoptysis, shortness of breath and wheezing.    Cardiovascular: Negative.  Negative for chest pain, leg swelling and palpitations.   Gastrointestinal:  Negative for abdominal distention, abdominal pain, blood in stool, constipation, diarrhea and nausea.   Endocrine: Negative for hot flashes.   Genitourinary:  Negative for bladder incontinence, difficulty urinating, dyspareunia, dysuria, frequency, hematuria, menstrual problem and vaginal bleeding (+heavy menstruation).    Musculoskeletal:  Negative for arthralgias, back pain, gait problem and myalgias.        +back and hip pain non-radiating   Neurological:  Negative for dizziness, extremity weakness, gait problem, headaches, numbness and seizures.   Hematological:  Negative for adenopathy. Does not bruise/bleed easily.   Psychiatric/Behavioral:  Negative for  "confusion, depression and sleep disturbance. The patient is not nervous/anxious.    All other systems reviewed and are negative.        Past Medical History:  has a past medical history of Diabetes mellitus, type 2 (Multi), Gout, unspecified (2014), Other conditions influencing health status (2013), Other general symptoms and signs (2019), Pain in left shoulder (2016), Pain in right foot (2016), Pain in right hip (2016), Pain in unspecified foot (2014), Pneumonia, unspecified organism (2016), and Thrombocytopenia (CMS-HCC).  Surgical History:   has a past surgical history that includes Appendectomy (2016) and Cholecystectomy.  Social History:   reports that she has never smoked. She has never used smokeless tobacco. She reports current alcohol use of about 1.0 standard drink of alcohol per week. She reports that she does not use drugs.  Family History:    Family History   Problem Relation Name Age of Onset    Diabetes Mother      Hypertension Father      Throat cancer Mother's Brother      Diabetes Maternal Grandmother      Diabetes Maternal Grandfather      Diabetes Paternal Grandmother      Diabetes Paternal Grandfather       Family Oncology History:  Cancer-related family history is not on file.      OBJECTIVE    VS / Pain:  /90   Pulse 67   Temp 36.6 °C (97.9 °F) (Temporal)   Resp 18   Ht 1.601 m (5' 3.03\")   Wt 84.5 kg (186 lb 4.6 oz)   SpO2 98%   BMI 32.97 kg/m²   BSA: 1.94 meters squared   Pain Scale: 3    Performance Status:  The ECOG performance scale today is ECO- Fully active, able to carry on all pre-disease performance w/o restriction.          Physical Exam  Constitutional:       General: She is not in acute distress.     Appearance: She is not ill-appearing, toxic-appearing or diaphoretic.   HENT:      Nose: No congestion or rhinorrhea.      Mouth/Throat:      Pharynx: No posterior oropharyngeal erythema.   Cardiovascular:      " Rate and Rhythm: Normal rate and regular rhythm.      Pulses: Normal pulses.      Heart sounds: Normal heart sounds. No murmur heard.     No gallop.   Pulmonary:      Breath sounds: Normal breath sounds.   Chest:       Abdominal:      General: There is no distension.      Palpations: There is no mass.      Tenderness: There is no abdominal tenderness.   Musculoskeletal:         General: No swelling.      Cervical back: No rigidity.      Right lower leg: No edema.      Left lower leg: No edema.   Lymphadenopathy:      Cervical: No cervical adenopathy.   Skin:     General: Skin is warm.      Coloration: Skin is not cyanotic.      Findings: No bruising, ecchymosis or erythema.   Neurological:      General: No focal deficit present.      Mental Status: She is oriented to person, place, and time.      Cranial Nerves: Cranial nerves 2-12 are intact.      Motor: Motor function is intact.   Psychiatric:         Attention and Perception: Attention and perception normal.         Mood and Affect: Mood and affect normal.         Behavior: Behavior normal.         Thought Content: Thought content normal.         Judgment: Judgment normal.           Diagnostic Results   === 12/11/24 ===    CT ABDOMEN PELVIS W IV CONTRAST    - Impression -  Incompletely included necrotic left breast mass    No sign of active soft tissue metastatic disease in the included  lower chest. Note left axillary and some of the other thoracic lymph  node stations needing evaluation with breast cancer are not included  in the field of view on this exam of the abdomen. The included lower  chest on today's exam cannot serve as an initial staging exam for the  chest as it is not completely included in the field of view    Few lytic and few sclerotic osseous lesions, raising suspicion for  osseous metastatic disease from breast cancer as detailed above    No soft tissue metastatic disease in the abdomen or pelvis    Massive fibroid uterus extends up into the  mid abdomen as detailed  above    The mass effect of the fibroid uterus is not causing hydronephrosis  on either side    MACRO:  None    Signed by: Michael Robbins 12/11/2024 2:15 PM  Dictation workstation:   [id number]     === 01/09/25 ===    NM PET CT CERIANNA TORSO    - Impression -  1. Left breast centrally necrotic mass consistent with known estrogen  receptor expressing malignancy.  2. Left axillary F-18 fluoroestradiol avid bernardino metastases.  3. Widespread estrogen receptor expressing osseous metastases as  described above.      I personally reviewed the images/study and I agree with the resident  Prasanna Garza's findings as stated. This study was interpreted  at [place], [place].    MACRO:  None      Signed by: Chapo Lewis 1/9/2025 3:39 PM  Dictation workstation:   [id number]     LABORATORY/PATHOLOGY DATA    Lab on 01/06/2025   Component Date Value Ref Range Status    CA 27.29 01/06/2025 93.2 (H)  0.0 - 38.6 U/mL Final    WBC 01/06/2025 7.9  4.4 - 11.3 x10*3/uL Final    nRBC 01/06/2025 0.0  0.0 - 0.0 /100 WBCs Final    RBC 01/06/2025 4.83  4.00 - 5.20 x10*6/uL Final    Hemoglobin 01/06/2025 13.2  12.0 - 16.0 g/dL Final    Hematocrit 01/06/2025 40.5  36.0 - 46.0 % Final    MCV 01/06/2025 84  80 - 100 fL Final    MCH 01/06/2025 27.3  26.0 - 34.0 pg Final    MCHC 01/06/2025 32.6  32.0 - 36.0 g/dL Final    RDW 01/06/2025 13.3  11.5 - 14.5 % Final    Platelets 01/06/2025 302  150 - 450 x10*3/uL Final    Neutrophils % 01/06/2025 76.4  40.0 - 80.0 % Final    Immature Granulocytes %, Automated 01/06/2025 0.4  0.0 - 0.9 % Final    Lymphocytes % 01/06/2025 15.9  13.0 - 44.0 % Final    Monocytes % 01/06/2025 6.2  2.0 - 10.0 % Final    Eosinophils % 01/06/2025 0.5  0.0 - 6.0 % Final    Basophils % 01/06/2025 0.6  0.0 - 2.0 % Final    Neutrophils Absolute 01/06/2025 6.04  1.20 - 7.70 x10*3/uL Final    Immature Granulocytes Absolute, Au* 01/06/2025 0.03  0.00 - 0.70  x10*3/uL Final    Lymphocytes Absolute 01/06/2025 1.26  1.20 - 4.80 x10*3/uL Final    Monocytes Absolute 01/06/2025 0.49  0.10 - 1.00 x10*3/uL Final    Eosinophils Absolute 01/06/2025 0.04  0.00 - 0.70 x10*3/uL Final    Basophils Absolute 01/06/2025 0.05  0.00 - 0.10 x10*3/uL Final    Glucose 01/06/2025 117 (H)  74 - 99 mg/dL Final    Sodium 01/06/2025 139  136 - 145 mmol/L Final    Potassium 01/06/2025 4.1  3.5 - 5.3 mmol/L Final    Chloride 01/06/2025 101  98 - 107 mmol/L Final    Bicarbonate 01/06/2025 26  21 - 32 mmol/L Final    Anion Gap 01/06/2025 16  10 - 20 mmol/L Final    Urea Nitrogen 01/06/2025 15  6 - 23 mg/dL Final    Creatinine 01/06/2025 0.90  0.50 - 1.05 mg/dL Final    eGFR 01/06/2025 76  >60 mL/min/1.73m*2 Final    Calcium 01/06/2025 10.3  8.6 - 10.3 mg/dL Final    Albumin 01/06/2025 4.7  3.4 - 5.0 g/dL Final    Alkaline Phosphatase 01/06/2025 79  33 - 110 U/L Final    Total Protein 01/06/2025 7.9  6.4 - 8.2 g/dL Final    AST 01/06/2025 24  9 - 39 U/L Final    Bilirubin, Total 01/06/2025 0.8  0.0 - 1.2 mg/dL Final    ALT 01/06/2025 16  7 - 45 U/L Final    Estradiol 01/06/2025 26  pg/mL Final    Follicle Stimulating Hormone 01/06/2025 87.5  IU/L Final    Luteinizing Hormone 01/06/2025 34.9  IU/L Final    Anti-Mullerian Hormone 01/06/2025 <0.003  ng/mL Final   Office Visit on 01/06/2025   Component Date Value Ref Range Status    Test Comment 01/05/2025 The test request has been received by the lab and will be reviewed.   Final    Reason for Study 01/06/2025 To identify mutations relevant to patient's cancer.   Final    Genetic Diseases Assessed 01/06/2025 Cancer   Final    Description of Ranges of DNA Seque* 01/06/2025 105 gene liquid biopsy   Final    Overall Interpretation 01/06/2025 positive   Final    [facility] Portal 01/06/2025 [email or web address]   Final    Low Coverage Regions 01/06/2025  RB1   Final    Therapy Count 01/06/2025 4   Final    [facility]: Potential Therapy 1 01/06/2025    Final                    Value:Gene: 8975^PIK3CA^HGNC  Variant: p.N345K  Match Type: snvIndel  Match Type Description: PIK3CA p.N345K  Agent: Alpelisib + Fulvestrant  Drug Class: Combination (PI3K Inhibitor + Estrogen Receptor Antagonist)  Tissue: Breast Cancer  Association: Response  Evidence Status: Consensus  Evidence ID: NCCN  KDB Variant: N345K - GOF  NCCN Associated Evidence: Consensus, Breast Cancer  MSK Associated Evidence: MSK OncoKB, Level 1  Label: FDA On Label  FDA Approved?: Yes  On label?: Yes    [facility]: Potential Therapy 2 01/06/2025    Final                    Value:Gene: 8975^PIK3CA^HGNC  Variant: p.N345K  Match Type: snvIndel  Match Type Description: PIK3CA p.N345K  Agent: Capivasertib + Fulvestrant  Drug Class: Combination (Pan-AKT Inhibitor + Estrogen Receptor Antagonist)  Tissue: Breast Cancer  Association: Response  Evidence Status: Consensus  Evidence ID: NCCN  KDB Variant: N345K - GOF  NCCN Associated Evidence: Consensus, Breast Cancer  MSK Associated Evidence: MSK OncoKB, Level 1  Label: FDA On Label  FDA Approved?: Yes  On label?: Yes    [facility]: Potential Therapy 3 01/06/2025    Final                    Value:Gene: 8975^PIK3CA^HGNC  Variant: p.N345K  Match Type: snvIndel  Match Type Description: PIK3CA p.N345K  Agent: Inavolisib + Fulvestrant + Palbociclib  Drug Class: Combination (PI3K Inhibitor + Estrogen Receptor Antagonist + CDK4/6 Inhibitor)  Tissue: Breast Cancer  Association: Response  Evidence Status: Consensus  Evidence ID: FDA  KDB Variant: Gain-of-function  MSK Associated Evidence: MSK OncoKB, Level 1  Label: FDA On Label  FDA Approved?: Yes  On label?: Yes    [facility]: Potential Therapy 4 01/06/2025    Final                    Value:Gene: 8975^PIK3CA^HGNC  Variant: p.N345K  Match Type: snvIndel  Match Type Description: PIK3CA p.N345K  Agent: Alpelisib  Drug Class: PI3K Inhibitor  Tissue:  Solid Tumors  Association: Response  Evidence Status: Clinical research  Evidence ID: [id number]  Evidence URL: https://www.ncbi.nlm.nih.gov/pubmed/21125065  Evidence Title: Phosphatidylinositol 3-Kinase  -Selective Inhibition With Alpelisib (CRJ756) in NMN2XM-[place] Solid Tumors: Results From the First-in-Human Study - PubMed  KDB Variant: Gain-of-function  Label: FDA Off Label  FDA Approved?: Yes  On label?: No    Trial Count 01/06/2025 3   Final    Trial 1: Matched criteria 01/06/2025    Final                    Value:Clinical Trial NCT ID: [id number]  Clinical Trial Title: Study of ATRN-119 in Patients with Advanced Solid Tumors  Clinical Trial URL: https://clinicaltrials.gov/ct2/show/YYK87488859  Clinical Phase: Phase 1/Phase 2  Clinical Trial Matches: TP53 p.A138V mutation  Clinical Trial Distance and Location: [place], [place]    Trial 2: Matched criteria 01/06/2025    Final                    Value:Clinical Trial NCT ID: [id number]  Clinical Trial Title: Gedatolisib Plus Fulvestrant With or Without Palbociclib vs Standard-of-Care for the Treatment of Patients With Advanced or Metastatic HR+/HER2- Breast Cancer (EDER-1)  Clinical Trial URL: https://clinicaltrials.gov/ct2/show/JCZ17965165  Clinical Phase: Phase 3  Clinical Trial Matches: PIK3CA p.N345K mutation  Clinical Trial Distance and Location: [place], [place]    Trial 3: Matched criteria 01/06/2025    Final                    Value:Clinical Trial NCT ID: [id number]  Clinical Trial Title: First-in-Human Study of STX-478 as Monotherapy and in Combination With Other Antineoplastic Agents in Participants With Advanced Solid Tumors  Clinical Trial URL: https://clinicaltrials.gov/ct2/show/SQP62479705  Clinical Phase: Phase 1/Phase 2  Clinical Trial Matches: PIK3CA p.N345K mutation  Clinical Trial Distance and Location: [place], [place]    Tumor Mutational Marsteller 01/06/2025 13.0  m/MB Final    Microsatellite Instability Note 01/06/2025 MSI-High not detected    Final   Hospital Outpatient Visit on 12/11/2024   Component Date Value Ref Range Status    Case Report 12/11/2024    Final                    Value:Surgical Pathology                                Case: [id number]                                  Authorizing Provider:  TAMIKO Elliott-THOMAS  Collected:           12/11/2024 1304              Ordering Location:     [facility]       Received:            12/11/2024 1436                                     Center                                                                       Pathologist:           Kalie Sood MD                                                           Specimens:   A) - BREAST CORE BIOPSY LEFT, Left breast 12:00 3 cmfn                                              B) - AXILLARY LYMPH NODE BIOPSY LEFT - BREAST, Left Axillary Lymph Node                    FINAL DIAGNOSIS 12/11/2024    Final                    Value:A. Left breast mass, 12:00, 3 cm from nipple, ultrasound guided core needle biopsy:  -- Invasive ductal carcinoma, grade 2, see note.    Note:  In this limited sample, the invasive carcinoma measures up to 7 mm in greatest dimension.  ER, MD and HER2 will be reported in an addendum.    B. Left axillary node, ultrasound guided core needle biopsy:  -- Invasive ductal carcinoma involving fibrous and fibroadipose tissue, see note.    Note: Invasive carcinoma involves predominantly fibrous and fibroadipose tissue. While there are few lymphocytes present, a definitive lymph node architecture is not identified. This may represent a lymph completely involved by carcinoma. Clinical and radiologic correlation is recommended.            12/11/2024    Final                    Value:By the signature on this report, the individual or group listed as making the Final Interpretation/Diagnosis certifies that they have reviewed this case.       Addendum 12/11/2024    Final                    Value:Surgical/Block Number: [id number]  A1  Specimen Site: Left breast mass  Specimen Type: core needle biopsy      Estrogen Receptor (clone SP1):  Positive         Percentage with nuclear staining: >95%        Intensity of staining: Strong    Progesterone Receptor (clone SP2): Positive  Percentage with nuclear stainin-50%        Intensity of staining: Moderate-Strong    HER2 (clone 4B5):            Negative (1+)          Comment:     ASCO/CAP guidelines for ischemic times are met for this sample.   Due to the absence of normal breast parenchyma, no internal positive control staining was identified.  External control tissue stains appropriately.      For ER: Ranges for interpretation: Invasive carcinoma cells exhibiting greater than or equal to 10% nuclear staining are considered POSITIVE. Invasive carcinoma cells exhibiting less than 10%, but greater than or equal to 1% are considered LOW POSITIVE. Invasive carcinoma cells exhibiting less than 1% staining are considered NEGATIVE.                           (Reference: Arch Pathol Lab Med. doi:10.5858/arpa. [id number]) The stated steroid receptor activity for ER was derived from rabbit monoclonal antibody staining (clone SP1) on formalin fixed, paraffin embedded specimens, unless otherwise noted.  Each assay is performed using appropriate positive and negative internal controls.    For ND: Ranges for interpretation: Invasive carcinoma cells exhibiting greater than or equal to 1% nuclear staining are considered POSITIVE.  Invasive carcinoma cells exhibiting less than 1% staining are considered NEGATIVE. (Reference: Arch Pathol Lab Med. doi:10.5858/arpa. 3010-9911-2C) The stated steroid receptor activity for ND was derived from rabbit monoclonal antibody staining (clone 1E2) on formalin fixed, paraffin embedded specimens, unless otherwise noted.  The method employed was a standard peroxidase labeled polymer detection system. Each assay is performed using appropriate positive and negative internal  "controls.    For HER2: Ranges for                           interpretation: POSITIVE (3+): greater than or equal to 10% tumor cells with intense and uniform staining; EQUIVOCAL (2+): weak to moderate complete immunoreactivity in >10% of tumor cells or circumferential intense staining in less than or equal to 10% of cells; and NEGATIVE (1+): Faint weak immunoreactivity in >10% of tumor cells, but only a portion of the membrane is positive; NEGATIVE (0):No immunoreactivity or immunoreactivity in less than or equal to 10% of tumor cells. All tests are performed using a Broussard Pathway HER-2/rory (4B5) rabbit monoclonal primary antibody on formalin fixed, paraffin embedded tissue, unless otherwise noted. Only invasive carcinoma is evaluated using the ASCO/CAP scoring system (Arch Pathol Lab Med 2018; 142: 8995-8859) unless otherwise specified.  External cell culture and tissue controls stain appropriately.       Clinical History 12/11/2024    Final                    Value:Ultrasound Guided Core Biopsy Left Breast Mass 12:00 3 cm fn    Gross Description 12/11/2024    Final                    Value:A: Received in formalin, labeled with the patient´s name and hospital number and \"left breast 12:00 3 cm FN\", are multiple irregular/cylindrical segments of yellow-white fatty soft tissue aggregating to 14.2 x 0.5 x 0.2 cm.  The specimen is submitted in toto in two cassettes.  RCC    NOTE:  Ischemia time: 12/11/24 at 1:04.  This specimen was placed into formalin at: 12/11/24 1:20 p.  B: Received in formalin, labeled with the patient´s name and hospital number and \"left axillary lymph node\", are multiple irregular/cylindrical segments of yellow-white fatty soft tissue aggregating to 1.4 x 0.3 x 0.2 cm.  The specimen is submitted in toto in one cassette.  RCC      Disclaimer 12/11/2024    Final                    Value:One or more of the reagents used to perform assays on this specimen MAY have contained components considered to " be analyte specific reagents (ASR's).  ASR's have not been cleared or approved by the U.S. Food and Drug Administration.  These assays were developed and their performance characteristics determined by the Department of Pathology at [facility] The FDA does not require this test to go through premarket FDA review. This test is used for clinical purposes. It should not be regarded as investigational or for research. This laboratory is certified under the Clinical Laboratory Improvement Amendments (CLIA) as qualified to perform high complexity clinical laboratory testing.  The assays were performed with appropriate positive and negative controls which stained appropriately.     Hospital Outpatient Visit on 12/11/2024   Component Date Value Ref Range Status    Case Report 12/11/2024    Final                    Value:Surgical Pathology                                Case: [id number]                                  Authorizing Provider:  TAMIKO [facility]  Collected:           12/11/2024 1304              Ordering Location:     [facility]       Received:            12/11/2024 1436                                     Center                                                                       Pathologist:           Kalie Sood MD                                                           Specimens:   A) - BREAST CORE BIOPSY LEFT, Left breast 12:00 3 cmfn                                              B) - AXILLARY LYMPH NODE BIOPSY LEFT - BREAST, Left Axillary Lymph Node                    FINAL DIAGNOSIS 12/11/2024    Final                    Value:A. Left breast mass, 12:00, 3 cm from nipple, ultrasound guided core needle biopsy:  -- Invasive ductal carcinoma, grade 2, see note.    Note:  In this limited sample, the invasive carcinoma measures up to 7 mm in greatest dimension.  ER, WI and HER2 will be reported in an addendum.    B. Left axillary node, ultrasound  guided core needle biopsy:  -- Invasive ductal carcinoma involving fibrous and fibroadipose tissue, see note.    Note: Invasive carcinoma involves predominantly fibrous and fibroadipose tissue. While there are few lymphocytes present, a definitive lymph node architecture is not identified. This may represent a lymph completely involved by carcinoma. Clinical and radiologic correlation is recommended.            2024    Final                    Value:By the signature on this report, the individual or group listed as making the Final Interpretation/Diagnosis certifies that they have reviewed this case.       Addendum 2024    Final                    Value:Surgical/Block Number: [id number] A1  Specimen Site: Left breast mass  Specimen Type: core needle biopsy      Estrogen Receptor (clone SP1):  Positive         Percentage with nuclear staining: >95%        Intensity of staining: Strong    Progesterone Receptor (clone SP2): Positive  Percentage with nuclear stainin-50%        Intensity of staining: Moderate-Strong    HER2 (clone 4B5):            Negative (1+)          Comment:     ASCO/CAP guidelines for ischemic times are met for this sample.   Due to the absence of normal breast parenchyma, no internal positive control staining was identified.  External control tissue stains appropriately.      For ER: Ranges for interpretation: Invasive carcinoma cells exhibiting greater than or equal to 10% nuclear staining are considered POSITIVE. Invasive carcinoma cells exhibiting less than 10%, but greater than or equal to 1% are considered LOW POSITIVE. Invasive carcinoma cells exhibiting less than 1% staining are considered NEGATIVE.                           (Reference: Arch Pathol Lab Med. doi:10.5858/arpa. [id number]) The stated steroid receptor activity for ER was derived from rabbit monoclonal antibody staining (clone SP1) on formalin fixed, paraffin embedded specimens, unless otherwise noted.  Each  "assay is performed using appropriate positive and negative internal controls.    For LA: Ranges for interpretation: Invasive carcinoma cells exhibiting greater than or equal to 1% nuclear staining are considered POSITIVE.  Invasive carcinoma cells exhibiting less than 1% staining are considered NEGATIVE. (Reference: Arch Pathol Lab Med. doi:10.5858/arpa. 7791-1201-2E) The stated steroid receptor activity for LA was derived from rabbit monoclonal antibody staining (clone 1E2) on formalin fixed, paraffin embedded specimens, unless otherwise noted.  The method employed was a standard peroxidase labeled polymer detection system. Each assay is performed using appropriate positive and negative internal controls.    For HER2: Ranges for                           interpretation: POSITIVE (3+): greater than or equal to 10% tumor cells with intense and uniform staining; EQUIVOCAL (2+): weak to moderate complete immunoreactivity in >10% of tumor cells or circumferential intense staining in less than or equal to 10% of cells; and NEGATIVE (1+): Faint weak immunoreactivity in >10% of tumor cells, but only a portion of the membrane is positive; NEGATIVE (0):No immunoreactivity or immunoreactivity in less than or equal to 10% of tumor cells. All tests are performed using a [employer] HER-2/rory (4B5) rabbit monoclonal primary antibody on formalin fixed, paraffin embedded tissue, unless otherwise noted. Only invasive carcinoma is evaluated using the ASCO/CAP scoring system (Arch Pathol Lab Med 2018; 142: 9040-8292) unless otherwise specified.  External cell culture and tissue controls stain appropriately.       Clinical History 12/11/2024    Final                    Value:Ultrasound Guided Core Biopsy Left Breast Mass 12:00 3 cm fn    Gross Description 12/11/2024    Final                    Value:A: Received in formalin, labeled with the patient´s name and hospital number and \"left breast 12:00 3 cm FN\", are multiple " "irregular/cylindrical segments of yellow-white fatty soft tissue aggregating to 14.2 x 0.5 x 0.2 cm.  The specimen is submitted in toto in two cassettes.  RCC    NOTE:  Ischemia time: 12/11/24 at 1:04.  This specimen was placed into formalin at: 12/11/24 1:20 p.  B: Received in formalin, labeled with the patient´s name and hospital number and \"left axillary lymph node\", are multiple irregular/cylindrical segments of yellow-white fatty soft tissue aggregating to 1.4 x 0.3 x 0.2 cm.  The specimen is submitted in toto in one cassette.  [facility]      Disclaimer 12/11/2024    Final                    Value:One or more of the reagents used to perform assays on this specimen MAY have contained components considered to be analyte specific reagents (ASR's).  ASR's have not been cleared or approved by the U.S. Food and Drug Administration.  These assays were developed and their performance characteristics determined by the Department of Pathology at [facility]. The FDA does not require this test to go through premarket FDA review. This test is used for clinical purposes. It should not be regarded as investigational or for research. This laboratory is certified under the Clinical Laboratory Improvement Amendments (CLIA) as qualified to perform high complexity clinical laboratory testing.  The assays were performed with appropriate positive and negative controls which stained appropriately.      Focused Solid Tumor DNA Results 12/11/2024    Final                    Value:Specimen: FFPE, [id number] A1  Estimated Tumor Content: 50%    MICROSATELLITE STATUS: Microsatellite Instability-High (MSI-H) is NOT DETECTED.      DISEASE ASSOCIATED GENOMIC FINDINGS:   CCND1 amplification  ESR1 p.[id number] ([id number] c.[id number])  PIK3CA p.N345K ([id number] c.1035T>A)  TP53 p.A138V ([id number] c.413C>T)    INTERPRETATION AND POTENTIAL THERAPEUTIC OPTIONS:  PIK3CA p.N345K  FDA RECOMMENDATIONS " (Advanced HR+Her2- Breast Cancer):  alpelisib + fulvestrant  capivasertib + fulvestrant    ESR1 p.[id number]  FDA RECOMMENDATIONS (Advanced or Metastatic ER+, HER2- Breast Cancer):  elacestarant (Subsequent)      DISEASE RELEVANT ALTERATIONS NOT DETECTED:   Negative for AKT1 activating mutation.  Negative for BRAF V600E.  Negative for PTEN loss of function mutation.      Archival material from this surgical specimen has been reviewed by the pathologist in order to determine the most appropriate tumor tissue for further molecular testing. The identification                           and selection of this tumor tissue is critical to the success of subsequent molecular testing and analysis.    DISCLAIMER:   This assay is designed to detect targeted clinically-relevant single nucleotide variants, insertion and deletions (<30bp), and whole gene high copy number amplifications in a select group of genes. This assay does not distinguish between somatic and germline alterations in analyzed regions. A negative result (mutation not identified) does not rule out the presence of a mutation below the limit of detection of this assay due to low neoplastic cell content, tumor heterogeneity, or the presence of additional mutations in the listed genes which are outside of the target regions in this assay. General population polymorphisms, promoter, synonymous and intronic variants (with the exception of splice variants) are not generally included in this report. The MSI-H/MARISOL designation is based on analysis of up to 10 mononucleotide repeat loci and not based on the 5 or 7 MSI                           described in current clinical practice guidelines. The threshold for MSI-H/MARISOL was determined by analytical concordance to dMMR IHC assays using mainly colorectal and uterine FFPE tissue. The clinical validity of the qualitative MSI designation has not been established.    Identification or absence of cancer-associated  mutations does not necessarily indicate a response to therapy.  Decisions on patient care and treatment must be based on the independent medical judgment of the treating physician, taking into account all applicable information concerning the patient's condition such as clinical and histopathologic findings, other laboratory findings, and patient preferences. This report includes information from public sources, including scientific and medical literature to better characterize the significance of alterations detected.    This laboratory developed test was developed and its analytical performance characteristics have been determined by [facility]. This                           test has not been cleared or approved by the FDA; however, the FDA has determined that such approval is not necessary. The [facility] is certified under the Clinical Laboratory Improvement Amendments of 1988 (CLIA-88) as qualified to perform high complexity testing.    PANEL GENE LIST:  Hotspot Mutations: AKT1, ALK, APC, ARAF, B2M, BRAF, CCND1, CDK4, CDKN2A, CREBBP, CTNNB1, EGFR, , ERBB2, ERBB3, ERBB4, ESR1, FBXW7, FGFR2, FGFR3, GNA11, GNAQ, H3F3A, LLRG0T5V, HRAS, IDH1, IDH2, JAK1, JAK2, JAK3, KEAP1, KIT, KRAS, MAP2K1, MAP2K2, MET, MTOR, NFE2L2, NRAS, PDGFRA, PIK3CA, POLE, PTEN, RET, ROS1, SMAD4, STK11, SMO, TP53, U2AF1.  Copy Number Variants: ALK, AR, BRAF, CCND1, CDK4, CDK6, EGFR, ERBB2, FGFR1, FGFR2, FGFR3, FGFR4, KIT, KRAS, MET, MYC, MYCN, PDGFRA, PIK3CA.    MSI Status Markers: mononucleotide repeat loci.  Not all exons of all genes are sequenced. Genome assembly (hg19) was used for alignment and variant calling.        Electronically signed and reported* 12/11/2024    Final                    Value:Mike Chris MD PhD     Lab on 12/10/2024   Component Date Value Ref Range Status    WBC 12/10/2024 5.9  4.4 - 11.3 x10*3/uL Final    nRBC 12/10/2024 0.0  0.0 - 0.0 /100 WBCs Final    RBC 12/10/2024 5.06  4.00 - 5.20 x10*6/uL  Final    Hemoglobin 12/10/2024 13.6  12.0 - 16.0 g/dL Final    Hematocrit 12/10/2024 42.8  36.0 - 46.0 % Final    MCV 12/10/2024 85  80 - 100 fL Final    MCH 12/10/2024 26.9  26.0 - 34.0 pg Final    MCHC 12/10/2024 31.8 (L)  32.0 - 36.0 g/dL Final    RDW 12/10/2024 12.6  11.5 - 14.5 % Final    Platelets 12/10/2024 282  150 - 450 x10*3/uL Final    Neutrophils % 12/10/2024 73.2  40.0 - 80.0 % Final    Immature Granulocytes %, Automated 12/10/2024 0.3  0.0 - 0.9 % Final    Lymphocytes % 12/10/2024 20.4  13.0 - 44.0 % Final    Monocytes % 12/10/2024 4.6  2.0 - 10.0 % Final    Eosinophils % 12/10/2024 1.0  0.0 - 6.0 % Final    Basophils % 12/10/2024 0.5  0.0 - 2.0 % Final    Neutrophils Absolute 12/10/2024 4.30  1.20 - 7.70 x10*3/uL Final    Immature Granulocytes Absolute, Au* 12/10/2024 0.02  0.00 - 0.70 x10*3/uL Final    Lymphocytes Absolute 12/10/2024 1.20  1.20 - 4.80 x10*3/uL Final    Monocytes Absolute 12/10/2024 0.27  0.10 - 1.00 x10*3/uL Final    Eosinophils Absolute 12/10/2024 0.06  0.00 - 0.70 x10*3/uL Final    Basophils Absolute 12/10/2024 0.03  0.00 - 0.10 x10*3/uL Final    Glucose 12/10/2024 111 (H)  74 - 99 mg/dL Final    Sodium 12/10/2024 143  136 - 145 mmol/L Final    Potassium 12/10/2024 4.3  3.5 - 5.3 mmol/L Final    Chloride 12/10/2024 103  98 - 107 mmol/L Final    Bicarbonate 12/10/2024 29  21 - 32 mmol/L Final    Anion Gap 12/10/2024 15  10 - 20 mmol/L Final    Urea Nitrogen 12/10/2024 10  6 - 23 mg/dL Final    Creatinine 12/10/2024 0.85  0.50 - 1.05 mg/dL Final    eGFR 12/10/2024 81  >60 mL/min/1.73m*2 Final    Calcium 12/10/2024 9.6  8.6 - 10.6 mg/dL Final    Albumin 12/10/2024 4.3  3.4 - 5.0 g/dL Final    Alkaline Phosphatase 12/10/2024 72  33 - 110 U/L Final    Total Protein 12/10/2024 7.4  6.4 - 8.2 g/dL Final    AST 12/10/2024 20  9 - 39 U/L Final    Bilirubin, Total 12/10/2024 0.7  0.0 - 1.2 mg/dL Final    ALT 12/10/2024 16  7 - 45 U/L Final    Cholesterol 12/10/2024 230 (H)  0 - 199 mg/dL  Final    HDL-Cholesterol 12/10/2024 50.4  mg/dL Final    Cholesterol/HDL Ratio 12/10/2024 4.6   Final    LDL Calculated 12/10/2024 138 (H)  <=99 mg/dL Final    VLDL 12/10/2024 42 (H)  0 - 40 mg/dL Final    Triglycerides 12/10/2024 208 (H)  0 - 149 mg/dL Final    Non HDL Cholesterol 12/10/2024 180 (H)  0 - 149 mg/dL Final    Thyroid Stimulating Hormone 12/10/2024 2.55  0.44 - 3.98 mIU/L Final    Vitamin D, 25-Hydroxy, Total 12/10/2024 21 (L)  30 - 100 ng/mL Final    Hemoglobin A1C 12/10/2024 7.1 (H)  See comment % Final    Estimated Average Glucose 12/10/2024 157  Not Established mg/dL Final            IMPRESSION/PLAN    Stage 4 ER+/WA+/HER2- breast cancer with evidence of bone mets on CT positive on estradiol PET.Tolerating anastrozole well. start goserelin today. We will start her on q3 month zometa on return visit. Follow-up with hoa in about 4 wks. Will start ribociclib when delivered by specialty pharmacy.      We discussed the clinical significance of diagnosis, goals of care and treatment plan in detail.     I personally spent over half of a total 35  minutes face to face with the patient in counseling and discussion and/or coordination of care as described above.         -------------------------------------------------------------------------------------------------------------------------------  Ivan Marinelli MD  Director of Breast Cancer Medical Oncology Research Program   [facility]  Professor of Medicine  [facility] [address], [address]  [place] [address]  Phone: [phone]  [email or web address]

## 2025-01-20 NOTE — PROGRESS NOTES
Iva Pollock is a 54 y.o. female who presents for first dose Zoladex.    Pt seen by Dr Marinelli prior to infusion appt, see his note for further assessment details.    Pt provided lexicomp patient education form on Zoladex. Ice used prior to injection to numb site. Pt tolerated injections well to RLQ ABD wall without incident. Pt discharged independently off unit in stable condition.

## 2025-01-22 ENCOUNTER — APPOINTMENT (OUTPATIENT)
Dept: PRIMARY CARE | Facility: CLINIC | Age: 55
End: 2025-01-22
Payer: COMMERCIAL

## 2025-01-22 VITALS
WEIGHT: 187 LBS | OXYGEN SATURATION: 98 % | HEART RATE: 72 BPM | BODY MASS INDEX: 33.09 KG/M2 | DIASTOLIC BLOOD PRESSURE: 98 MMHG | TEMPERATURE: 96.9 F | SYSTOLIC BLOOD PRESSURE: 157 MMHG

## 2025-01-22 DIAGNOSIS — E78.9 LIPID DISORDER: ICD-10-CM

## 2025-01-22 DIAGNOSIS — E11.65 TYPE 2 DIABETES MELLITUS WITH HYPERGLYCEMIA, WITHOUT LONG-TERM CURRENT USE OF INSULIN: ICD-10-CM

## 2025-01-22 DIAGNOSIS — I10 PRIMARY HYPERTENSION: Primary | ICD-10-CM

## 2025-01-22 PROCEDURE — 4010F ACE/ARB THERAPY RXD/TAKEN: CPT | Performed by: FAMILY MEDICINE

## 2025-01-22 PROCEDURE — 3080F DIAST BP >= 90 MM HG: CPT | Performed by: FAMILY MEDICINE

## 2025-01-22 PROCEDURE — 99214 OFFICE O/P EST MOD 30 MIN: CPT | Performed by: FAMILY MEDICINE

## 2025-01-22 PROCEDURE — 3077F SYST BP >= 140 MM HG: CPT | Performed by: FAMILY MEDICINE

## 2025-01-22 PROCEDURE — 1036F TOBACCO NON-USER: CPT | Performed by: FAMILY MEDICINE

## 2025-01-22 RX ORDER — METOPROLOL SUCCINATE 50 MG/1
50 TABLET, EXTENDED RELEASE ORAL DAILY
Qty: 30 TABLET | Refills: 5 | Status: SHIPPED | OUTPATIENT
Start: 2025-01-22 | End: 2025-07-21

## 2025-01-22 RX ORDER — ROSUVASTATIN CALCIUM 10 MG/1
10 TABLET, COATED ORAL DAILY
Qty: 100 TABLET | Refills: 3 | Status: SHIPPED | OUTPATIENT
Start: 2025-01-22 | End: 2026-02-26

## 2025-01-22 RX ORDER — METFORMIN HYDROCHLORIDE 500 MG/1
1000 TABLET, EXTENDED RELEASE ORAL
Qty: 200 TABLET | Refills: 3 | Status: SHIPPED | OUTPATIENT
Start: 2025-01-22 | End: 2026-02-26

## 2025-01-22 NOTE — PROGRESS NOTES
\  Patient is here for follow-up    She has stage IV breast carcinoma with bone metastasis  She has a fungating breast tumor of her left breast advised her to use petroleum or Vaseline to cover the open area so that she will have better comfort    She is seeing the oncologist will be starting chemo orally and she is on Arimidex    For diabetes I increase the metformin to 1000 mg twice a day    Added metoprolol for high blood pressure continue with losartan    I will also start her on a statin rosuvastatin 10 mg to lower the cholesterol before she left the office I did not tell her therefore I will message her on [employer]

## 2025-01-28 ENCOUNTER — APPOINTMENT (OUTPATIENT)
Dept: PRIMARY CARE | Facility: CLINIC | Age: 55
End: 2025-01-28
Payer: COMMERCIAL

## 2025-01-30 ENCOUNTER — TELEPHONE (OUTPATIENT)
Dept: GENETICS | Facility: CLINIC | Age: 55
End: 2025-01-30
Payer: COMMERCIAL

## 2025-01-30 NOTE — TELEPHONE ENCOUNTER
Called patient regarding her upcoming appointment with [facility]. Reviewed family history information to draw a pedigree. Answered questions about the appointment.

## 2025-02-03 ENCOUNTER — APPOINTMENT (OUTPATIENT)
Dept: GENETICS | Facility: CLINIC | Age: 55
End: 2025-02-03
Payer: COMMERCIAL

## 2025-02-03 DIAGNOSIS — C77.3 BREAST CANCER METASTASIZED TO AXILLARY LYMPH NODE, LEFT (MULTI): ICD-10-CM

## 2025-02-03 DIAGNOSIS — C50.912 BREAST CANCER METASTASIZED TO AXILLARY LYMPH NODE, LEFT (MULTI): ICD-10-CM

## 2025-02-03 PROCEDURE — 96041 GENETIC COUNSELING SVC EA 30: CPT | Performed by: GENETIC COUNSELOR, MS

## 2025-02-03 NOTE — PROGRESS NOTES
HISTORY OF PRESENT ILLNESS:  Ms. Iva Pollock is a 54 year old woman with a recent diagnosis of metastatic breast cancer.  She was referred to the Cancer Genetics Clinic at [facility] for genetic counseling and discussion of genetic testing.       Virtual or Telephone Consent    A telephone visit (audio only) between the patient (at the originating site) and the provider (at the distant site) was utilized to provide this telehealth service.     Verbal consent was requested and obtained from Iva Pollock on this date, 25 for a telehealth visit.        CANCER MEDICAL HISTORY:    PERSONAL HISTORY OF CANCER?   YES        TYPE: Stage I right-sided breast cancer   AGE OF DIAGNOSIS:  54  SYMPTOMS and METHOD OF DIAGNOSIS:    Ms. Pollock present with a palpable ulcerating lesion of the left breast, with bilateral diagnostic mammogram and target US demonstrating at 12 o'clock, 3 cm from the nipple a large heterogeneous hypoechoic mass measuring at least 6.1cm.  Imaging also showed a spiculated mass in the left axilla.     US guided core needle biopsy performed 2024 of left breast mass and left axillary lymph node showed invasive ductal carcinoma, ER+, CT+, HER2-.  Initial staging scans showed some evidence of bone metastasis, confirmed by cerianna estradiol pet.    Stage IV (cT4b, cN1(f), cM1, G2)     Ms. Pollock initiated Anastrozole 25, with first goserelin 2025 and ribociclib pending under the care of Dr. Marinelli of medical oncology.     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  This mutation was also identified on cfDNA in blood sent for [employer] xF testing at an allele frequency of 57%.      HISTORY of OTHER CANCERS?  No       Reproductive History / Claudia model information:  menarche at 12, jefe-menopausal, , with no hormone exposure such as birth control pills or post menopausal  estrogen                 FAMILY HISTORY:  A four-generation pedigree was obtained and was significant for the following:    -Maternal uncle with throat cancer in his 60s, smoking history,  in his 60s.    Ms. Pollock is [place].  There is no known Ashkenazi Christianity ancestry. Consanguinity was denied.        GENETIC COUNSELING DISCUSSION:  Ms. Iva Pollock is a 54 year old woman with a recent diagnosis of metastatic breast cancer.  She meets current NCCN guidelines for genetic testing of high-penetrance breast cancer susceptibility genes, including BRCA1, BRCA2, CDH1, PALB2, PTEN, STK11, and TP53, due to her diagnosis of metastatic breast cancer at any age.      We reviewed that most cancers are not due to an inherited genetic cause. However, in about 5-10% of families, there is an inherited genetic mutation that can make a person more susceptible to developing certain forms of cancer. Within these families, we often see multiple family members with cancer, occurring in multiple generations. In addition, earlier onset and multiple primary cancers are suggestive of an inherited predisposition. Finally, there is a clustering of certain types of cancer in these families, such as breast and ovarian cancer.      BRCA1 and BRCA2 are two genes that have been linked to early-onset breast and/or ovarian cancer. Mutations in these genes are inherited in a dominant pattern and confer up to an 87% lifetime risk for breast cancer. This is elevated compared to the general population risk of 10-12%. In addition, BRCA1 and BRCA2 mutation carriers have up to a 45% lifetime risk for ovarian cancer, which is elevated over the 2% general population risk. Mutation carriers who have already been diagnosed with cancer have an increased risk to develop a second primary breast cancer. BRCA gene mutation carriers (BRCA2 in particular) have an increased risk for male breast cancer, prostate cancer, melanoma, gastric cancer, and  pancreatic cancer.    In addition to BRCA1/2, other breast cancer susceptibility genes have been identified. Many of these genes have been well-described in the medical literature and are known to increase the risk for both a first and a second primary breast cancer, as well as other well-established cancer risks.   There are also several low/moderate risk breast cancer susceptibility genes that have been more recently found. For some of these genes, further research is needed to determine associated cancer risks and guide appropriate medical management recommendations. The majority of these moderate risk genes have not been shown to increase the risk for second primary breast cancers, though identifying a moderate risk mutation would be helpful in determine the risk for other cancer types and risks to family members.     Of note, somatic tumor testing on Ms. Pollock's breast cancer identified a mutation in the TP53 gene designated p.A138V ([id number] c.413C>T).  This mutation was also identified on cfDNA in blood sent for [employer] testing at an allele frequency (AF) of 57%.  The TP53 p.A138V mutation has been reported in large databases as a germline finding, and has also been observed somatically in multiple types of tumor tissue.  Somatic mutations in the TP53 gene are one of the most frequent alterations in human cancers, occurring in 40-60% of breast cancers.  Rarely, TP53 mutations can be constitutional/germline, and can cause an inherited cancer syndrome known as Li-Fraumeni Syndrome (LFS).     Ms. Pollock's personal and family histories do not meet clinical criteria for LFS (classic or Chompret), given her later age of cancer onset and absence of LFS-associated cancers in family members.  This suggests the TP53 finding most likely represents an acquired somatic alteration within the tumor tissue.  Should Ms. Pollock elect genetic testing, this will include analysis of the TP53 gene for assessment of germline  status.  A normal germline result will support that the TP53 mutation is likely of somatic origin.  However, given the high AF of the TP53 mutation in circulating blood, it is possible the mutation could be picked up on a germline panel as a potential false positive result if performed on a blood or saliva sample.      Alternative sources of DNA for germline testing include buccal tissue obtained via cheek swab or on cultured fibroblast cells obtained by skin biopsy.  The potential for contamination of circulating tumor cells/DNA in buccal tissue is not well defined in the published literature. Therefore, follow-up testing on cultured fibroblast cells may be indicated for further assessment of germline status if the TP53 mutation is detected in a buccal sample.  We also discussed the option of proceeding directly with a skin biopsy vs starting with buccal sample.  However, skin biopsy would be unnecessary if germline analysis on a buccal sample is normal, and thus this procedure could be avoided if no mutation is found in buccal DNA for Ms. Pollock.       A normal germline panel test for Ms. Pollock would indicate that her breast cancer is likely sporadic (non-inherited), and no further germline testing would be indicated for her or her family members at this time.  Her mother would be considered to have an ~2-fold increased risk for breast cancer given Ms. Pollock's diagnosis.  Her mother is currently age 84, and it is unlikely changes would be made to her mother's breast cancer screening based on this modest family history-based risk.  Ms. Pollock does not have sisters, nor does she have children, nieces, or other close living female relatives.  Cancer screening would not be altered for her brother, father, or other male relatives if she is found to have a normal germline test result.  They would be instructed to follow population cancer screening guidelines developed for men at average risk.    If the TP53 mutation  were confirmed to be germline for Ms. Pollock, this will have important implications for her future cancer risks and associated screening recommendations, as well as for her at-risk family members.  We also discussed that for individuals with a germline TP53 mutation, radiation therapy for cancer should be avoided when possible, unless risk reduction/survival benefits outweigh risk for secondary malignancies.    We reviewed options for germline genetic testing, including analysis of the high- and moderate-risk breast cancer genes alone or testing these genes in the context of a larger multi-cancer gene panel.  Testing would be performed through [employer], with results typically available within 2-4 weeks of sample submission.  Should Ms. Pollock elect to pursue genetic testing, a buccal collection kit can be mailed to her home address.      Following our discussion today, Ms. Pollock was undecided about pursuing genetic testing, as she was uncertain as to whether she wished to learn of potential additional cancer risks.  She would like to discuss this further with her oncology team, particularly with regards to whether germline results would alter her current breast cancer treatment.  She would also like to discuss genetic testing with family members, to determine their interest in learning of a potential hereditary cancer risk within the family.        We remain available to Ms. Pollock at [phone] with any questions or concerns regarding the information discussed today, and to facilitate testing should she wish to pursue this at a later time.      Total time spent on day of encounter: 62 minutes (37 minutes with patient, 25 minutes on pre/post patient care activities, including documentation).    Clau Gonzalez MS  Licensed Genetic Counselor  [place] [address]  Phone: [phone]  Fax:  [phone]

## 2025-02-11 ENCOUNTER — NURSE TRIAGE (OUTPATIENT)
Dept: ADMISSION | Facility: HOSPITAL | Age: 55
End: 2025-02-11
Payer: COMMERCIAL

## 2025-02-11 DIAGNOSIS — T45.1X5A CINV (CHEMOTHERAPY-INDUCED NAUSEA AND VOMITING): Primary | ICD-10-CM

## 2025-02-11 DIAGNOSIS — R11.2 CINV (CHEMOTHERAPY-INDUCED NAUSEA AND VOMITING): Primary | ICD-10-CM

## 2025-02-11 RX ORDER — ONDANSETRON HYDROCHLORIDE 8 MG/1
8 TABLET, FILM COATED ORAL EVERY 8 HOURS PRN
Qty: 20 TABLET | Refills: 3 | Status: SHIPPED | OUTPATIENT
Start: 2025-02-11 | End: 2025-03-04

## 2025-02-11 NOTE — TELEPHONE ENCOUNTER
"Pt states she is having increased nausea since Thur- she does not have any anti nausea meds at home. Pt had 1 bout of emesis but nothing further. Eating crackers/pretzels, drinking water and gingerale. Urine is yellow, no bowel issues.   Pt denies fever, body aches, chills. Pt does have some congestion and \"stuffy nose\" .   Currently on 2nd day of 3rd week of kisqali.       "

## 2025-02-11 NOTE — TELEPHONE ENCOUNTER
Pt notified on identified VM that script for zofran sent to her pharmacy.   If new/worsening symptoms pt should call back, contact info left.

## 2025-02-16 PROBLEM — Z79.83 ENCOUNTER FOR MONITORING BISPHOSPHONATE THERAPY: Status: ACTIVE | Noted: 2025-02-16

## 2025-02-16 PROBLEM — E28.39 SUPPRESSION OF OVARIAN SECRETION: Status: ACTIVE | Noted: 2025-02-16

## 2025-02-16 PROBLEM — Z51.81 ENCOUNTER FOR MONITORING BISPHOSPHONATE THERAPY: Status: ACTIVE | Noted: 2025-02-16

## 2025-02-16 PROBLEM — Z51.81 ENCOUNTER FOR MONITORING AROMATASE INHIBITOR THERAPY: Status: ACTIVE | Noted: 2025-02-16

## 2025-02-16 PROBLEM — Z51.11 ENCOUNTER FOR ANTINEOPLASTIC CHEMOTHERAPY: Status: ACTIVE | Noted: 2025-02-16

## 2025-02-16 PROBLEM — Z79.811 ENCOUNTER FOR MONITORING AROMATASE INHIBITOR THERAPY: Status: ACTIVE | Noted: 2025-02-16

## 2025-02-17 ENCOUNTER — TELEPHONE (OUTPATIENT)
Dept: HEMATOLOGY/ONCOLOGY | Facility: HOSPITAL | Age: 55
End: 2025-02-17

## 2025-02-17 ENCOUNTER — OFFICE VISIT (OUTPATIENT)
Dept: HEMATOLOGY/ONCOLOGY | Facility: HOSPITAL | Age: 55
End: 2025-02-17
Payer: COMMERCIAL

## 2025-02-17 ENCOUNTER — INFUSION (OUTPATIENT)
Dept: HEMATOLOGY/ONCOLOGY | Facility: HOSPITAL | Age: 55
End: 2025-02-17
Payer: COMMERCIAL

## 2025-02-17 VITALS
HEIGHT: 63 IN | SYSTOLIC BLOOD PRESSURE: 163 MMHG | TEMPERATURE: 97.5 F | WEIGHT: 179.01 LBS | RESPIRATION RATE: 16 BRPM | HEART RATE: 60 BPM | BODY MASS INDEX: 31.72 KG/M2 | OXYGEN SATURATION: 100 % | DIASTOLIC BLOOD PRESSURE: 90 MMHG

## 2025-02-17 DIAGNOSIS — Z51.11 ENCOUNTER FOR ANTINEOPLASTIC CHEMOTHERAPY: ICD-10-CM

## 2025-02-17 DIAGNOSIS — R11.2 CHEMOTHERAPY INDUCED NAUSEA AND VOMITING: ICD-10-CM

## 2025-02-17 DIAGNOSIS — K52.1 CHEMOTHERAPY INDUCED DIARRHEA: ICD-10-CM

## 2025-02-17 DIAGNOSIS — Z79.811 ENCOUNTER FOR MONITORING AROMATASE INHIBITOR THERAPY: ICD-10-CM

## 2025-02-17 DIAGNOSIS — C50.112 MALIGNANT NEOPLASM OF CENTRAL PORTION OF LEFT BREAST IN FEMALE, ESTROGEN RECEPTOR POSITIVE: ICD-10-CM

## 2025-02-17 DIAGNOSIS — T45.1X5A CHEMOTHERAPY INDUCED NAUSEA AND VOMITING: ICD-10-CM

## 2025-02-17 DIAGNOSIS — C79.51 MALIGNANT NEOPLASM METASTATIC TO BONE (MULTI): ICD-10-CM

## 2025-02-17 DIAGNOSIS — T45.1X5A CHEMOTHERAPY-INDUCED FATIGUE: ICD-10-CM

## 2025-02-17 DIAGNOSIS — T45.1X5A CHEMOTHERAPY INDUCED DIARRHEA: ICD-10-CM

## 2025-02-17 DIAGNOSIS — C79.51 MALIGNANT NEOPLASM METASTATIC TO BONE (MULTI): Primary | ICD-10-CM

## 2025-02-17 DIAGNOSIS — Z51.81 ENCOUNTER FOR MONITORING AROMATASE INHIBITOR THERAPY: ICD-10-CM

## 2025-02-17 DIAGNOSIS — Z17.0 MALIGNANT NEOPLASM OF CENTRAL PORTION OF LEFT BREAST IN FEMALE, ESTROGEN RECEPTOR POSITIVE: ICD-10-CM

## 2025-02-17 DIAGNOSIS — Z79.83 ENCOUNTER FOR MONITORING BISPHOSPHONATE THERAPY: ICD-10-CM

## 2025-02-17 DIAGNOSIS — Z51.81 ENCOUNTER FOR MONITORING BISPHOSPHONATE THERAPY: ICD-10-CM

## 2025-02-17 DIAGNOSIS — E28.39 SUPPRESSION OF OVARIAN SECRETION: ICD-10-CM

## 2025-02-17 DIAGNOSIS — R53.83 CHEMOTHERAPY-INDUCED FATIGUE: ICD-10-CM

## 2025-02-17 LAB
ALBUMIN SERPL BCP-MCNC: 4.2 G/DL (ref 3.4–5)
ALP SERPL-CCNC: 63 U/L (ref 33–110)
ALT SERPL W P-5'-P-CCNC: 11 U/L (ref 7–45)
ANION GAP SERPL CALC-SCNC: 17 MMOL/L (ref 10–20)
AST SERPL W P-5'-P-CCNC: 21 U/L (ref 9–39)
BASOPHILS # BLD AUTO: 0.02 X10*3/UL (ref 0–0.1)
BASOPHILS NFR BLD AUTO: 0.9 %
BILIRUB SERPL-MCNC: 0.5 MG/DL (ref 0–1.2)
BUN SERPL-MCNC: 31 MG/DL (ref 6–23)
CALCIUM SERPL-MCNC: 10.4 MG/DL (ref 8.6–10.3)
CHLORIDE SERPL-SCNC: 105 MMOL/L (ref 98–107)
CO2 SERPL-SCNC: 21 MMOL/L (ref 21–32)
CREAT SERPL-MCNC: 2.37 MG/DL (ref 0.5–1.05)
EGFRCR SERPLBLD CKD-EPI 2021: 24 ML/MIN/1.73M*2
EOSINOPHIL # BLD AUTO: 0.02 X10*3/UL (ref 0–0.7)
EOSINOPHIL NFR BLD AUTO: 0.9 %
ERYTHROCYTE [DISTWIDTH] IN BLOOD BY AUTOMATED COUNT: 13.4 % (ref 11.5–14.5)
GLUCOSE SERPL-MCNC: 91 MG/DL (ref 74–99)
HCT VFR BLD AUTO: 42.7 % (ref 36–46)
HGB BLD-MCNC: 12.8 G/DL (ref 12–16)
HYPOCHROMIA BLD QL SMEAR: NORMAL
IMM GRANULOCYTES # BLD AUTO: 0.01 X10*3/UL (ref 0–0.7)
IMM GRANULOCYTES NFR BLD AUTO: 0.4 % (ref 0–0.9)
LYMPHOCYTES # BLD AUTO: 0.75 X10*3/UL (ref 1.2–4.8)
LYMPHOCYTES NFR BLD AUTO: 32.1 %
MCH RBC QN AUTO: 27.9 PG (ref 26–34)
MCHC RBC AUTO-ENTMCNC: 30 G/DL (ref 32–36)
MCV RBC AUTO: 93 FL (ref 80–100)
MONOCYTES # BLD AUTO: 0.09 X10*3/UL (ref 0.1–1)
MONOCYTES NFR BLD AUTO: 3.8 %
NEUTROPHILS # BLD AUTO: 1.45 X10*3/UL (ref 1.2–7.7)
NEUTROPHILS NFR BLD AUTO: 61.9 %
NRBC BLD-RTO: 0 /100 WBCS (ref 0–0)
OVALOCYTES BLD QL SMEAR: NORMAL
PLATELET # BLD AUTO: 112 X10*3/UL (ref 150–450)
POTASSIUM SERPL-SCNC: 4.8 MMOL/L (ref 3.5–5.3)
PROT SERPL-MCNC: 7.5 G/DL (ref 6.4–8.2)
RBC # BLD AUTO: 4.58 X10*6/UL (ref 4–5.2)
RBC MORPH BLD: NORMAL
SODIUM SERPL-SCNC: 138 MMOL/L (ref 136–145)
WBC # BLD AUTO: 2.3 X10*3/UL (ref 4.4–11.3)

## 2025-02-17 PROCEDURE — 3008F BODY MASS INDEX DOCD: CPT | Performed by: NURSE PRACTITIONER

## 2025-02-17 PROCEDURE — 36415 COLL VENOUS BLD VENIPUNCTURE: CPT

## 2025-02-17 PROCEDURE — 2500000004 HC RX 250 GENERAL PHARMACY W/ HCPCS (ALT 636 FOR OP/ED): Performed by: INTERNAL MEDICINE

## 2025-02-17 PROCEDURE — 85025 COMPLETE CBC W/AUTO DIFF WBC: CPT

## 2025-02-17 PROCEDURE — 93005 ELECTROCARDIOGRAM TRACING: CPT

## 2025-02-17 PROCEDURE — 4010F ACE/ARB THERAPY RXD/TAKEN: CPT | Performed by: NURSE PRACTITIONER

## 2025-02-17 PROCEDURE — 3077F SYST BP >= 140 MM HG: CPT | Performed by: NURSE PRACTITIONER

## 2025-02-17 PROCEDURE — 84075 ASSAY ALKALINE PHOSPHATASE: CPT

## 2025-02-17 PROCEDURE — 99215 OFFICE O/P EST HI 40 MIN: CPT | Performed by: NURSE PRACTITIONER

## 2025-02-17 PROCEDURE — 3080F DIAST BP >= 90 MM HG: CPT | Performed by: NURSE PRACTITIONER

## 2025-02-17 RX ORDER — DIPHENHYDRAMINE HYDROCHLORIDE 50 MG/ML
50 INJECTION INTRAMUSCULAR; INTRAVENOUS AS NEEDED
OUTPATIENT
Start: 2025-05-12

## 2025-02-17 RX ORDER — FAMOTIDINE 10 MG/ML
20 INJECTION, SOLUTION INTRAVENOUS ONCE AS NEEDED
OUTPATIENT
Start: 2025-05-12

## 2025-02-17 RX ORDER — ZOLEDRONIC ACID 0.04 MG/ML
4 INJECTION, SOLUTION INTRAVENOUS ONCE
Status: DISCONTINUED | OUTPATIENT
Start: 2025-02-17 | End: 2025-02-17

## 2025-02-17 RX ORDER — ANASTROZOLE 1 MG/1
1 TABLET ORAL DAILY
Qty: 90 TABLET | Refills: 3 | Status: SHIPPED | OUTPATIENT
Start: 2025-02-17 | End: 2026-02-17

## 2025-02-17 RX ORDER — DIPHENHYDRAMINE HYDROCHLORIDE 50 MG/ML
50 INJECTION INTRAMUSCULAR; INTRAVENOUS AS NEEDED
Status: DISCONTINUED | OUTPATIENT
Start: 2025-02-17 | End: 2025-02-17 | Stop reason: HOSPADM

## 2025-02-17 RX ORDER — FAMOTIDINE 10 MG/ML
20 INJECTION, SOLUTION INTRAVENOUS ONCE AS NEEDED
Status: DISCONTINUED | OUTPATIENT
Start: 2025-02-17 | End: 2025-02-17 | Stop reason: HOSPADM

## 2025-02-17 RX ORDER — ALBUTEROL SULFATE 0.83 MG/ML
3 SOLUTION RESPIRATORY (INHALATION) AS NEEDED
Status: DISCONTINUED | OUTPATIENT
Start: 2025-02-17 | End: 2025-02-17 | Stop reason: HOSPADM

## 2025-02-17 RX ORDER — ALBUTEROL SULFATE 0.83 MG/ML
3 SOLUTION RESPIRATORY (INHALATION) AS NEEDED
OUTPATIENT
Start: 2025-05-12

## 2025-02-17 RX ORDER — EPINEPHRINE 0.3 MG/.3ML
0.3 INJECTION SUBCUTANEOUS EVERY 5 MIN PRN
Status: DISCONTINUED | OUTPATIENT
Start: 2025-02-17 | End: 2025-02-17 | Stop reason: HOSPADM

## 2025-02-17 RX ORDER — EPINEPHRINE 0.3 MG/.3ML
0.3 INJECTION SUBCUTANEOUS EVERY 5 MIN PRN
OUTPATIENT
Start: 2025-05-12

## 2025-02-17 RX ORDER — ZOLEDRONIC ACID 0.04 MG/ML
4 INJECTION, SOLUTION INTRAVENOUS ONCE
OUTPATIENT
Start: 2025-05-12

## 2025-02-17 ASSESSMENT — PATIENT HEALTH QUESTIONNAIRE - PHQ9
SUM OF ALL RESPONSES TO PHQ9 QUESTIONS 1 & 2: 2
1. LITTLE INTEREST OR PLEASURE IN DOING THINGS: SEVERAL DAYS
2. FEELING DOWN, DEPRESSED OR HOPELESS: SEVERAL DAYS

## 2025-02-17 ASSESSMENT — PAIN SCALES - GENERAL: PAINLEVEL_OUTOF10: 0-NO PAIN

## 2025-02-17 NOTE — PATIENT INSTRUCTIONS
Today 1St Zometa- as we discussed water intake with your regimen is very important. Please try to limit teas to 16oz per day and goal is 64 oz of water per day. With Zometa, please try Claritin for today and the next few days.     12 lead EKG today and again in 2 weeks (walk in Cardiology 1st floor [place]- do this around 3/3/25.    Follow up Dr Ivan Marinelli / Ruby MORRISON, CNP in 4 weeks and again in 8 weeks with restaging scan    Please continue daily anastrozole and Kisqail 600 ng every 21 day with 7 days off. Restart Kisqali on 2/24/25.      Next goserelin shot (every 12 weeks) 4/14/25     Please call [phone] with any questions or concerns prior to your next office visit.

## 2025-02-17 NOTE — PROGRESS NOTES
Patient seen in infusion for planned IV Zometa, was not treated today due to elevated kidney function. Patient requested labs be attempted off IV and patient is difficult stick. Mild hemolysis of labs possible and Ruby Stewart CNP made aware. Labs and schedule reviewed with patient. Patient to repeat labs in next few days. IV removed. Discharged ambulatory in stable condition.

## 2025-02-17 NOTE — TELEPHONE ENCOUNTER
Call to patient to review labs- likely false Creatinine and Calcium due to difficult stick and hemolysis- we have reviewed water intake and repeat labs tomorrow. Patient without any symptoms of kidney dysfunction

## 2025-02-17 NOTE — PROGRESS NOTES
[facility]ast [facility]  Location: [facility]      BREAST CANCER DIAGNOSIS  Dec 2024 Malignant neoplasm of central portion of left breast in female, estrogen receptor positive, Clinical: Stage IV (cT4b, cN1(f), cM1, G2, ER+, IA+, HER2-)         CURRENT THERAPY  Anastrozole since 1/13/25, first goserelin 1/13/25,  1/27/25 600 mg  ribociclib, 2/17/25 Zometa.  Jan 2025 Tempus with PIK3CA mutation    HISTORY OF PRESENT ILLNESS    Iva Pollock is a 54 y.o. woman with recent diagnosis of left sided breast cancer ER/IA+ with ulcerating mass which has been there for a while.  Initial staging scans showed some evidence of bone mets and confirmed by cerianna estradiol pet. Patient has noted non-radiating hip and back pain for the last 12 months.    Yash presents today for IV breast cancer tx follow up. Today I have reviewed with the patient I will be conducting a clinical physical breast exam. Patient has declined a second medical professional today during the exam as a chapperone. She is accompanied today by her  Jose Luis.     She is compliant on daily anastrozole since 1/13/25 and Kisqali with week 4 since 1/27/25. She will be starting Zometa today. She reports week one went well. She reports slowly lost her appetite and did have an episode of vomiting in week 2. She started with one 8 mg Zofran daily since this time. She reports with kisqali will have watery diarrhea but has so far avoided imodium. She reports a few lbs of weight loss.    She reports fatigue and is more winded with exertion. She cannot keep up with her prior exercise routine on treadmill from prior and is winded with stairs. She reports sleep is stable, will wake up mid night and struggles to get back to sleep. She will nap on and off as well. She is currently off work with new dx. She denies any chest pain, no cough issues. She reports BP is at its baseline as she checks it at home.     She denies any vision changes or headache issues,  dizziness or loss of balance, no falls. She denies any baseline neuropathy as she has DM II.     She reports baseline lower back and hip pain that has not changed- she will use OTC medication not even weekly with ibuprofen/ tylenol. Will take 975mg tylenol / 600 mg ibuprofen as needed.  She otherwise denies any new or unexplained bone aches or pains.     She reports baseline left chest wall lesion is dry, no more drainage or bleeding since before kisqail. She reports the left chest wall lesion has increased in new odd surges of pain that come and go.  She otherwise denies any skin lesions or masses, oral sores lesions or infections    Last period was 2024, denies any breakthrough bleeding         Review of Systems  ROS 14 points performed, See HPI for exceptions      Past Medical History:  has a past medical history of Diabetes mellitus, type 2 (Multi), Gout, unspecified (2014), Other conditions influencing health status (2013), Other general symptoms and signs (2019), Pain in left shoulder (2016), Pain in right foot (2016), Pain in right hip (2016), Pain in unspecified foot (2014), Pneumonia, unspecified organism (2016), and Thrombocytopenia (CMS-[facility]).  Surgical History:   has a past surgical history that includes Appendectomy (2016) and Cholecystectomy.  Social History:   reports that she has never smoked. She has never used smokeless tobacco. She reports current alcohol use of about 1.0 standard drink of alcohol per week. She reports that she does not use drugs.  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     GYN:     Family History:    Family History   Problem Relation Name Age of Onset    Diabetes Mother      Hypertension Father      Throat cancer Mother's Brother      Diabetes Maternal Grandmother      Diabetes Maternal Grandfather      Diabetes Paternal  "Grandmother      Diabetes Paternal Grandfather       Family Oncology History:  Cancer-related family history is not on file.      OBJECTIVE    VS / Pain:  /90 Comment: NP/RN informed  Pulse 60   Temp 36.4 °C (97.5 °F) (Temporal)   Resp 16   Ht 1.601 m (5' 3.03\")   Wt 81.2 kg (179 lb 0.2 oz)   SpO2 100%   BMI 31.68 kg/m²   BSA: 1.9 meters squared   Pain Scale: 3    Performance Status:  The ECOG performance scale today is ECO- Fully active, able to carry on all pre-disease performance w/o restriction.      Physical Exam  Constitutional: Well developed, awake/alert/oriented x4, no distress, alert and cooperative  EYES: Sclera clear  ENMT: mucous membranes moist, no apparent injury, no lesions seen  Head/Neck: Neck supple, no apparent injury, thyroid without mass or tenderness, No JVD, trachea midline, no bruits  Respiratory / Thoracic: Patent airways, clear to all lobes, normal breath sounds with good chest expansion, thorax symmetric.  Cardiovascular: Regular, rate and rhythm, no murmurs, 2+ equal pulses of the extremities, normal auscultated S 1and S 2  GI: Nondistended, soft, non-tender, no rebound tenderness or guarding, no masses palpable, no organomegaly, +BS, no bruits  Musculoskeletal: ROM intact, no joint swelling, normal strength, no spinal tenderness  Extremities: normal extremities, no cyanosis edema, contusions or wounds, no clubbing  Neurological: alert and oriented x4, intact senses, motor, response and reflexes, normal strength  Breast: 4 to 4.5cm ulcerating lesion- see media below. Palpable matted Lymph nodes left axilla. No masses right breast.   Lymphatic: No cervical, supraclavicular, infraclavicular or axillary lymphadenopathy  Psychological: Appropriate and talkative mood and behavior  Skin: Warm and dry, no lesions, no rashes, no jaundice                Diagnostic Results   === 24 ===    CT ABDOMEN PELVIS W IV CONTRAST    - Impression -  Incompletely included necrotic left " breast mass    No sign of active soft tissue metastatic disease in the included  lower chest. Note left axillary and some of the other thoracic lymph  node stations needing evaluation with breast cancer are not included  in the field of view on this exam of the abdomen. The included lower  chest on today's exam cannot serve as an initial staging exam for the  chest as it is not completely included in the field of view    Few lytic and few sclerotic osseous lesions, raising suspicion for  osseous metastatic disease from breast cancer as detailed above    No soft tissue metastatic disease in the abdomen or pelvis    Massive fibroid uterus extends up into the mid abdomen as detailed  above    The mass effect of the fibroid uterus is not causing hydronephrosis  on either side    MACRO:  None    Signed by: Michael Robbins 12/11/2024 2:15 PM  Dictation workstation:   [id number]     === 01/09/25 ===    NM PET CT CERIANNA TORSO    - Impression -  1. Left breast centrally necrotic mass consistent with known estrogen  receptor expressing malignancy.  2. Left axillary F-18 fluoroestradiol avid bernardino metastases.  3. Widespread estrogen receptor expressing osseous metastases as  described above.      I personally reviewed the images/study and I agree with the resident  Prasanna Garza's findings as stated. This study was interpreted  at [place], [place].    MACRO:  None      Signed by: Chapo Lewis 1/9/2025 3:39 PM  Dictation workstation:   [id number]     LABORATORY/PATHOLOGY DATA  Pending CBC, CMP and Ca 27.29, estradiol, LH and FSH      No visits with results within 6 Week(s) from this visit.   Latest known visit with results is:   Lab on 01/06/2025   Component Date Value Ref Range Status    CA 27.29 01/06/2025 93.2 (H)  0.0 - 38.6 U/mL Final    WBC 01/06/2025 7.9  4.4 - 11.3 x10*3/uL Final    nRBC 01/06/2025 0.0  0.0 - 0.0 /100 WBCs Final    RBC 01/06/2025 4.83  4.00 - 5.20  x10*6/uL Final    Hemoglobin 01/06/2025 13.2  12.0 - 16.0 g/dL Final    Hematocrit 01/06/2025 40.5  36.0 - 46.0 % Final    MCV 01/06/2025 84  80 - 100 fL Final    MCH 01/06/2025 27.3  26.0 - 34.0 pg Final    MCHC 01/06/2025 32.6  32.0 - 36.0 g/dL Final    RDW 01/06/2025 13.3  11.5 - 14.5 % Final    Platelets 01/06/2025 302  150 - 450 x10*3/uL Final    Neutrophils % 01/06/2025 76.4  40.0 - 80.0 % Final    Immature Granulocytes %, Automated 01/06/2025 0.4  0.0 - 0.9 % Final    Lymphocytes % 01/06/2025 15.9  13.0 - 44.0 % Final    Monocytes % 01/06/2025 6.2  2.0 - 10.0 % Final    Eosinophils % 01/06/2025 0.5  0.0 - 6.0 % Final    Basophils % 01/06/2025 0.6  0.0 - 2.0 % Final    Neutrophils Absolute 01/06/2025 6.04  1.20 - 7.70 x10*3/uL Final    Immature Granulocytes Absolute, Au* 01/06/2025 0.03  0.00 - 0.70 x10*3/uL Final    Lymphocytes Absolute 01/06/2025 1.26  1.20 - 4.80 x10*3/uL Final    Monocytes Absolute 01/06/2025 0.49  0.10 - 1.00 x10*3/uL Final    Eosinophils Absolute 01/06/2025 0.04  0.00 - 0.70 x10*3/uL Final    Basophils Absolute 01/06/2025 0.05  0.00 - 0.10 x10*3/uL Final    Glucose 01/06/2025 117 (H)  74 - 99 mg/dL Final    Sodium 01/06/2025 139  136 - 145 mmol/L Final    Potassium 01/06/2025 4.1  3.5 - 5.3 mmol/L Final    Chloride 01/06/2025 101  98 - 107 mmol/L Final    Bicarbonate 01/06/2025 26  21 - 32 mmol/L Final    Anion Gap 01/06/2025 16  10 - 20 mmol/L Final    Urea Nitrogen 01/06/2025 15  6 - 23 mg/dL Final    Creatinine 01/06/2025 0.90  0.50 - 1.05 mg/dL Final    eGFR 01/06/2025 76  >60 mL/min/1.73m*2 Final    Calcium 01/06/2025 10.3  8.6 - 10.3 mg/dL Final    Albumin 01/06/2025 4.7  3.4 - 5.0 g/dL Final    Alkaline Phosphatase 01/06/2025 79  33 - 110 U/L Final    Total Protein 01/06/2025 7.9  6.4 - 8.2 g/dL Final    AST 01/06/2025 24  9 - 39 U/L Final    Bilirubin, Total 01/06/2025 0.8  0.0 - 1.2 mg/dL Final    ALT 01/06/2025 16  7 - 45 U/L Final    Estradiol 01/06/2025 26  pg/mL Final     Follicle Stimulating Hormone 01/06/2025 87.5  IU/L Final    Luteinizing Hormone 01/06/2025 34.9  IU/L Final    Anti-Mullerian Hormone 01/06/2025 <0.003  ng/mL Final            IMPRESSION/PLAN    Stage 4 ER+/GA+/HER2- breast cancer with evidence of bone mets on CT positive on estradiol PET.  Tolerating anastrozole and ovarian suppression with Q12 week goserelin. Will draw hormone levels today with routine CBC w/ Diff, CMP and Ca 27.29. Initiated Kisqali 1/27/25. Will get baseline 12 lead EKG today and 2nd in 2 weeks.    Reported treatment related side effects outlined below  Grade 1 -2 nausea daily since week 2 of regimen- is stable on daily 8mg Zofran. Will continue to monitor   Grade 1 diarrhea Loose and stable, reviewed when to utilize imodium  Grade 1-2 Fatigue is currently on medical leave from work, is still able to carry out self care and daily activities at home     q3 month zometa  will begin today, we have review supportive measures with this medication.     RTC in 4 weeks with me with labs and 8 weeks with Dr Ivan Marinelli with restaging scans and labs       At least 65 minutes of direct consultation was spent with the patient today reviewing her cancer care plan, educating and answering questions regarding ongoing follow up, greater than 50% in counseling and coordination of care      Thank you for the opportunity to be involved your care.   We discussed the clinical significance of diagnosis, goals of care and treatment plan in detail.   Please do not hesitate to reach out with any questions.           -------------------------------------------------------------------------------------------------------------------------------  Ruby Stewart MSN, APRN, FNP-C  [facility]  Division of Medical Oncology- Breast   Collaborating Physician Dr. Ivan Marinelli   [facility] Nurse [facility] Breast Disease Team   [facility] [address]  [address]  Phone: [phone]  Fax: [phone]  Available via Secure Chat    Confidential Peer Review Document-  Privilege  Privileged Pursuant to [place] Revised Code Section 2305.24, .25, .251 & .252

## 2025-02-18 ENCOUNTER — TELEPHONE (OUTPATIENT)
Dept: HEMATOLOGY/ONCOLOGY | Facility: HOSPITAL | Age: 55
End: 2025-02-18
Payer: COMMERCIAL

## 2025-02-18 ENCOUNTER — LAB (OUTPATIENT)
Dept: LAB | Facility: HOSPITAL | Age: 55
End: 2025-02-18
Payer: COMMERCIAL

## 2025-02-18 DIAGNOSIS — C79.51 MALIGNANT NEOPLASM METASTATIC TO BONE (MULTI): ICD-10-CM

## 2025-02-18 DIAGNOSIS — Z51.11 ENCOUNTER FOR ANTINEOPLASTIC CHEMOTHERAPY: ICD-10-CM

## 2025-02-18 DIAGNOSIS — C50.112 MALIGNANT NEOPLASM OF CENTRAL PORTION OF LEFT BREAST IN FEMALE, ESTROGEN RECEPTOR POSITIVE: ICD-10-CM

## 2025-02-18 DIAGNOSIS — Z17.0 MALIGNANT NEOPLASM OF CENTRAL PORTION OF LEFT BREAST IN FEMALE, ESTROGEN RECEPTOR POSITIVE: ICD-10-CM

## 2025-02-18 DIAGNOSIS — N17.9 AKI (ACUTE KIDNEY INJURY) (CMS-HCC): Primary | ICD-10-CM

## 2025-02-18 DIAGNOSIS — Z51.81 ENCOUNTER FOR MONITORING AROMATASE INHIBITOR THERAPY: ICD-10-CM

## 2025-02-18 DIAGNOSIS — Z79.811 ENCOUNTER FOR MONITORING AROMATASE INHIBITOR THERAPY: ICD-10-CM

## 2025-02-18 LAB
ALBUMIN SERPL BCP-MCNC: 4.3 G/DL (ref 3.4–5)
ALP SERPL-CCNC: 62 U/L (ref 33–110)
ALT SERPL W P-5'-P-CCNC: 12 U/L (ref 7–45)
ANION GAP SERPL CALC-SCNC: 17 MMOL/L (ref 10–20)
AST SERPL W P-5'-P-CCNC: 19 U/L (ref 9–39)
BILIRUB SERPL-MCNC: 0.5 MG/DL (ref 0–1.2)
BUN SERPL-MCNC: 23 MG/DL (ref 6–23)
CALCIUM SERPL-MCNC: 9.6 MG/DL (ref 8.6–10.3)
CHLORIDE SERPL-SCNC: 105 MMOL/L (ref 98–107)
CO2 SERPL-SCNC: 23 MMOL/L (ref 21–32)
CREAT SERPL-MCNC: 2.09 MG/DL (ref 0.5–1.05)
EGFRCR SERPLBLD CKD-EPI 2021: 28 ML/MIN/1.73M*2
GLUCOSE SERPL-MCNC: 89 MG/DL (ref 74–99)
HOLD SPECIMEN: NORMAL
POTASSIUM SERPL-SCNC: 4.5 MMOL/L (ref 3.5–5.3)
PROT SERPL-MCNC: 7.2 G/DL (ref 6.4–8.2)
SODIUM SERPL-SCNC: 140 MMOL/L (ref 136–145)

## 2025-02-18 PROCEDURE — 36415 COLL VENOUS BLD VENIPUNCTURE: CPT

## 2025-02-18 PROCEDURE — 80053 COMPREHEN METABOLIC PANEL: CPT

## 2025-02-18 NOTE — TELEPHONE ENCOUNTER
Call to patient to review updated CMP showing resolved of elevated Calcium but continued elevation of Creatinine. Plan is to hold kisqali for 2 weeks, do stat urine for micro, stat ultrasound of the kidneys and stat nephrology referral. We have discuss repeat labs next week as well. She understands I have reached out to our scheduling to help with these apts and urine is a walk in apt at the lab. Questions answered, she is agreeable to this plan. Rx adjusted to 400mg dose

## 2025-02-19 ENCOUNTER — TELEPHONE (OUTPATIENT)
Dept: HEMATOLOGY/ONCOLOGY | Facility: HOSPITAL | Age: 55
End: 2025-02-19
Payer: COMMERCIAL

## 2025-02-19 ENCOUNTER — LAB (OUTPATIENT)
Dept: LAB | Facility: HOSPITAL | Age: 55
End: 2025-02-19
Payer: COMMERCIAL

## 2025-02-19 DIAGNOSIS — N17.9 AKI (ACUTE KIDNEY INJURY) (CMS-HCC): ICD-10-CM

## 2025-02-19 DIAGNOSIS — Z51.11 ENCOUNTER FOR ANTINEOPLASTIC CHEMOTHERAPY: ICD-10-CM

## 2025-02-19 DIAGNOSIS — C79.51 MALIGNANT NEOPLASM METASTATIC TO BONE (MULTI): ICD-10-CM

## 2025-02-19 DIAGNOSIS — Z51.81 ENCOUNTER FOR MONITORING AROMATASE INHIBITOR THERAPY: ICD-10-CM

## 2025-02-19 DIAGNOSIS — Z79.811 ENCOUNTER FOR MONITORING AROMATASE INHIBITOR THERAPY: ICD-10-CM

## 2025-02-19 LAB
APPEARANCE UR: CLEAR
BILIRUB UR STRIP.AUTO-MCNC: NEGATIVE MG/DL
COLOR UR: NORMAL
GLUCOSE UR STRIP.AUTO-MCNC: NORMAL MG/DL
KETONES UR STRIP.AUTO-MCNC: NEGATIVE MG/DL
LEUKOCYTE ESTERASE UR QL STRIP.AUTO: NEGATIVE
NITRITE UR QL STRIP.AUTO: NEGATIVE
PH UR STRIP.AUTO: 5 [PH]
PROT UR STRIP.AUTO-MCNC: NEGATIVE MG/DL
RBC # UR STRIP.AUTO: NEGATIVE MG/DL
SP GR UR STRIP.AUTO: 1
UROBILINOGEN UR STRIP.AUTO-MCNC: NORMAL MG/DL

## 2025-02-19 PROCEDURE — 81003 URINALYSIS AUTO W/O SCOPE: CPT

## 2025-02-19 NOTE — TELEPHONE ENCOUNTER
Call to patient to review urine testing that is normal. Reviewed kidney ultrasound that is scheduled for tomorrow. Reviewed repeat labs for next week.

## 2025-02-20 ENCOUNTER — HOSPITAL ENCOUNTER (OUTPATIENT)
Dept: RADIOLOGY | Facility: HOSPITAL | Age: 55
Discharge: HOME | End: 2025-02-20
Payer: COMMERCIAL

## 2025-02-20 DIAGNOSIS — C79.51 MALIGNANT NEOPLASM METASTATIC TO BONE (MULTI): ICD-10-CM

## 2025-02-20 DIAGNOSIS — Z51.11 ENCOUNTER FOR ANTINEOPLASTIC CHEMOTHERAPY: ICD-10-CM

## 2025-02-20 DIAGNOSIS — Z79.811 ENCOUNTER FOR MONITORING AROMATASE INHIBITOR THERAPY: ICD-10-CM

## 2025-02-20 DIAGNOSIS — Z51.81 ENCOUNTER FOR MONITORING AROMATASE INHIBITOR THERAPY: ICD-10-CM

## 2025-02-20 DIAGNOSIS — N17.9 AKI (ACUTE KIDNEY INJURY) (CMS-HCC): ICD-10-CM

## 2025-02-20 PROCEDURE — 76770 US EXAM ABDO BACK WALL COMP: CPT

## 2025-02-21 ENCOUNTER — TELEPHONE (OUTPATIENT)
Dept: HEMATOLOGY/ONCOLOGY | Facility: HOSPITAL | Age: 55
End: 2025-02-21
Payer: COMMERCIAL

## 2025-02-21 NOTE — TELEPHONE ENCOUNTER
ANABELA left for patient regarding renal ultrasound results. I have instructed her to stick with the current plan of adequate hydration, continue to hold kisqali and repeat labs next week. I have reviewed reasons to go to ED. I have reviewed nephrology consult in August I have messaged this Dr to see if she can be seen sooner

## 2025-02-21 NOTE — TELEPHONE ENCOUNTER
----- Message from Ivan Marinelli sent at 2/21/2025 11:05 AM EST -----  She needs to take care of her kidneys and see nephrology  ----- Message -----  From: TAMIKO Valentine-THOMAS  Sent: 2/21/2025   8:35 AM EST  To: Ivan Marinelli MD    Let me know your thoughts on this and I will call her

## 2025-02-24 DIAGNOSIS — N17.9 AKI (ACUTE KIDNEY INJURY) (CMS-HCC): Primary | ICD-10-CM

## 2025-02-26 ENCOUNTER — LAB (OUTPATIENT)
Dept: LAB | Facility: HOSPITAL | Age: 55
End: 2025-02-26
Payer: COMMERCIAL

## 2025-02-26 DIAGNOSIS — C79.51 MALIGNANT NEOPLASM METASTATIC TO BONE (MULTI): ICD-10-CM

## 2025-02-26 DIAGNOSIS — Z51.11 ENCOUNTER FOR ANTINEOPLASTIC CHEMOTHERAPY: ICD-10-CM

## 2025-02-26 DIAGNOSIS — Z79.811 ENCOUNTER FOR MONITORING AROMATASE INHIBITOR THERAPY: ICD-10-CM

## 2025-02-26 DIAGNOSIS — N17.9 AKI (ACUTE KIDNEY INJURY) (CMS-HCC): ICD-10-CM

## 2025-02-26 DIAGNOSIS — Z51.81 ENCOUNTER FOR MONITORING AROMATASE INHIBITOR THERAPY: ICD-10-CM

## 2025-02-26 LAB
ALBUMIN SERPL BCP-MCNC: 4.2 G/DL (ref 3.4–5)
ALP SERPL-CCNC: 67 U/L (ref 33–110)
ALT SERPL W P-5'-P-CCNC: 16 U/L (ref 7–45)
ANION GAP SERPL CALC-SCNC: 12 MMOL/L (ref 10–20)
AST SERPL W P-5'-P-CCNC: 22 U/L (ref 9–39)
BASOPHILS # BLD AUTO: 0.04 X10*3/UL (ref 0–0.1)
BASOPHILS NFR BLD AUTO: 1.8 %
BILIRUB SERPL-MCNC: 0.5 MG/DL (ref 0–1.2)
BUN SERPL-MCNC: 12 MG/DL (ref 6–23)
CALCIUM SERPL-MCNC: 9.2 MG/DL (ref 8.6–10.3)
CHLORIDE SERPL-SCNC: 105 MMOL/L (ref 98–107)
CO2 SERPL-SCNC: 27 MMOL/L (ref 21–32)
CREAT SERPL-MCNC: 1.6 MG/DL (ref 0.5–1.05)
EGFRCR SERPLBLD CKD-EPI 2021: 38 ML/MIN/1.73M*2
EOSINOPHIL # BLD AUTO: 0.03 X10*3/UL (ref 0–0.7)
EOSINOPHIL NFR BLD AUTO: 1.4 %
ERYTHROCYTE [DISTWIDTH] IN BLOOD BY AUTOMATED COUNT: 13.9 % (ref 11.5–14.5)
GLUCOSE SERPL-MCNC: 130 MG/DL (ref 74–99)
HCT VFR BLD AUTO: 35.7 % (ref 36–46)
HGB BLD-MCNC: 11.8 G/DL (ref 12–16)
IMM GRANULOCYTES # BLD AUTO: 0 X10*3/UL (ref 0–0.7)
IMM GRANULOCYTES NFR BLD AUTO: 0 % (ref 0–0.9)
LYMPHOCYTES # BLD AUTO: 0.85 X10*3/UL (ref 1.2–4.8)
LYMPHOCYTES NFR BLD AUTO: 39 %
MCH RBC QN AUTO: 28.1 PG (ref 26–34)
MCHC RBC AUTO-ENTMCNC: 33.1 G/DL (ref 32–36)
MCV RBC AUTO: 85 FL (ref 80–100)
MONOCYTES # BLD AUTO: 0.25 X10*3/UL (ref 0.1–1)
MONOCYTES NFR BLD AUTO: 11.5 %
NEUTROPHILS # BLD AUTO: 1.01 X10*3/UL (ref 1.2–7.7)
NEUTROPHILS NFR BLD AUTO: 46.3 %
NRBC BLD-RTO: 0 /100 WBCS (ref 0–0)
PLATELET # BLD AUTO: 202 X10*3/UL (ref 150–450)
POTASSIUM SERPL-SCNC: 4.3 MMOL/L (ref 3.5–5.3)
PROT SERPL-MCNC: 7.2 G/DL (ref 6.4–8.2)
RBC # BLD AUTO: 4.2 X10*6/UL (ref 4–5.2)
SODIUM SERPL-SCNC: 140 MMOL/L (ref 136–145)
WBC # BLD AUTO: 2.2 X10*3/UL (ref 4.4–11.3)

## 2025-02-26 PROCEDURE — 36415 COLL VENOUS BLD VENIPUNCTURE: CPT

## 2025-02-26 PROCEDURE — 85025 COMPLETE CBC W/AUTO DIFF WBC: CPT

## 2025-02-26 PROCEDURE — 80053 COMPREHEN METABOLIC PANEL: CPT

## 2025-02-27 ENCOUNTER — TELEPHONE (OUTPATIENT)
Dept: ADMISSION | Facility: HOSPITAL | Age: 55
End: 2025-02-27
Payer: COMMERCIAL

## 2025-02-27 NOTE — TELEPHONE ENCOUNTER
Patient had labs done yesterday, she wanted to double check the results are ok for her to proceed with starting the Kisqali on Monday  Forwarding to team to review

## 2025-02-27 NOTE — TELEPHONE ENCOUNTER
Spoke with patient, Labs reviewed per Dr. Marinelli patient can start next cycle on Weds.  Lowered dose of 400mg daily x21 days 7 days off.  She verbalized back understanding.

## 2025-03-03 ENCOUNTER — INFUSION (OUTPATIENT)
Dept: HEMATOLOGY/ONCOLOGY | Facility: HOSPITAL | Age: 55
End: 2025-03-03
Payer: COMMERCIAL

## 2025-03-03 VITALS
BODY MASS INDEX: 32.19 KG/M2 | OXYGEN SATURATION: 100 % | DIASTOLIC BLOOD PRESSURE: 90 MMHG | RESPIRATION RATE: 18 BRPM | HEART RATE: 60 BPM | TEMPERATURE: 98.6 F | WEIGHT: 181.88 LBS | SYSTOLIC BLOOD PRESSURE: 140 MMHG

## 2025-03-03 DIAGNOSIS — C50.112 MALIGNANT NEOPLASM OF CENTRAL PORTION OF LEFT BREAST IN FEMALE, ESTROGEN RECEPTOR POSITIVE: ICD-10-CM

## 2025-03-03 DIAGNOSIS — Z17.0 MALIGNANT NEOPLASM OF CENTRAL PORTION OF LEFT BREAST IN FEMALE, ESTROGEN RECEPTOR POSITIVE: ICD-10-CM

## 2025-03-03 DIAGNOSIS — C79.51 MALIGNANT NEOPLASM METASTATIC TO BONE (MULTI): ICD-10-CM

## 2025-03-03 PROCEDURE — 96365 THER/PROPH/DIAG IV INF INIT: CPT | Mod: INF

## 2025-03-03 PROCEDURE — 2500000004 HC RX 250 GENERAL PHARMACY W/ HCPCS (ALT 636 FOR OP/ED): Performed by: NURSE PRACTITIONER

## 2025-03-03 RX ORDER — ALBUTEROL SULFATE 0.83 MG/ML
3 SOLUTION RESPIRATORY (INHALATION) AS NEEDED
OUTPATIENT
Start: 2025-05-12

## 2025-03-03 RX ORDER — DIPHENHYDRAMINE HYDROCHLORIDE 50 MG/ML
50 INJECTION INTRAMUSCULAR; INTRAVENOUS AS NEEDED
OUTPATIENT
Start: 2025-05-12

## 2025-03-03 RX ORDER — EPINEPHRINE 0.3 MG/.3ML
0.3 INJECTION SUBCUTANEOUS EVERY 5 MIN PRN
OUTPATIENT
Start: 2025-05-12

## 2025-03-03 RX ORDER — FAMOTIDINE 10 MG/ML
20 INJECTION, SOLUTION INTRAVENOUS ONCE AS NEEDED
OUTPATIENT
Start: 2025-05-12

## 2025-03-03 RX ADMIN — ZOLEDRONIC ACID 3 MG: 4 INJECTION, SOLUTION, CONCENTRATE INTRAVENOUS at 10:41

## 2025-03-03 ASSESSMENT — PAIN SCALES - GENERAL: PAINLEVEL_OUTOF10: 0-NO PAIN

## 2025-03-03 NOTE — PROGRESS NOTES
Patient arrived for zometa infusion. PIV placed, ordered EKG done. Patient tolerated infusion without difficulty and discharged in stable condition.

## 2025-03-05 ENCOUNTER — APPOINTMENT (OUTPATIENT)
Dept: GENETICS | Facility: CLINIC | Age: 55
End: 2025-03-05
Payer: COMMERCIAL

## 2025-03-10 ENCOUNTER — LAB (OUTPATIENT)
Dept: LAB | Facility: HOSPITAL | Age: 55
End: 2025-03-10
Payer: COMMERCIAL

## 2025-03-10 ENCOUNTER — TELEPHONE (OUTPATIENT)
Dept: HEMATOLOGY/ONCOLOGY | Facility: HOSPITAL | Age: 55
End: 2025-03-10

## 2025-03-10 ENCOUNTER — APPOINTMENT (OUTPATIENT)
Dept: NEPHROLOGY | Facility: CLINIC | Age: 55
End: 2025-03-10
Payer: COMMERCIAL

## 2025-03-10 VITALS
SYSTOLIC BLOOD PRESSURE: 115 MMHG | DIASTOLIC BLOOD PRESSURE: 77 MMHG | HEIGHT: 63 IN | BODY MASS INDEX: 30.83 KG/M2 | WEIGHT: 174 LBS | HEART RATE: 58 BPM

## 2025-03-10 DIAGNOSIS — Z79.811 ENCOUNTER FOR MONITORING AROMATASE INHIBITOR THERAPY: ICD-10-CM

## 2025-03-10 DIAGNOSIS — Z51.11 ENCOUNTER FOR ANTINEOPLASTIC CHEMOTHERAPY: ICD-10-CM

## 2025-03-10 DIAGNOSIS — N17.9 AKI (ACUTE KIDNEY INJURY) (CMS-HCC): Primary | ICD-10-CM

## 2025-03-10 DIAGNOSIS — E28.39 SUPPRESSION OF OVARIAN SECRETION: ICD-10-CM

## 2025-03-10 DIAGNOSIS — Z79.83 ENCOUNTER FOR MONITORING BISPHOSPHONATE THERAPY: ICD-10-CM

## 2025-03-10 DIAGNOSIS — Z17.0 MALIGNANT NEOPLASM OF CENTRAL PORTION OF LEFT BREAST IN FEMALE, ESTROGEN RECEPTOR POSITIVE: ICD-10-CM

## 2025-03-10 DIAGNOSIS — I10 ESSENTIAL HYPERTENSION: Primary | ICD-10-CM

## 2025-03-10 DIAGNOSIS — Z51.81 ENCOUNTER FOR MONITORING BISPHOSPHONATE THERAPY: ICD-10-CM

## 2025-03-10 DIAGNOSIS — Z51.81 ENCOUNTER FOR MONITORING AROMATASE INHIBITOR THERAPY: ICD-10-CM

## 2025-03-10 DIAGNOSIS — C79.51 MALIGNANT NEOPLASM METASTATIC TO BONE (MULTI): ICD-10-CM

## 2025-03-10 DIAGNOSIS — C50.112 MALIGNANT NEOPLASM OF CENTRAL PORTION OF LEFT BREAST IN FEMALE, ESTROGEN RECEPTOR POSITIVE: ICD-10-CM

## 2025-03-10 DIAGNOSIS — N17.9 AKI (ACUTE KIDNEY INJURY) (CMS-HCC): ICD-10-CM

## 2025-03-10 LAB
ALBUMIN SERPL BCP-MCNC: 4.5 G/DL (ref 3.4–5)
ALP SERPL-CCNC: 69 U/L (ref 33–110)
ALT SERPL W P-5'-P-CCNC: 11 U/L (ref 7–45)
ANION GAP SERPL CALC-SCNC: 15 MMOL/L (ref 10–20)
APPEARANCE UR: ABNORMAL
AST SERPL W P-5'-P-CCNC: 17 U/L (ref 9–39)
BASOPHILS # BLD AUTO: 0.04 X10*3/UL (ref 0–0.1)
BASOPHILS NFR BLD AUTO: 0.9 %
BILIRUB SERPL-MCNC: 0.6 MG/DL (ref 0–1.2)
BILIRUB UR STRIP.AUTO-MCNC: NEGATIVE MG/DL
BUN SERPL-MCNC: 16 MG/DL (ref 6–23)
CALCIUM SERPL-MCNC: 9.1 MG/DL (ref 8.6–10.3)
CANCER AG27-29 SERPL-ACNC: 73.2 U/ML (ref 0–38.6)
CHLORIDE SERPL-SCNC: 106 MMOL/L (ref 98–107)
CO2 SERPL-SCNC: 23 MMOL/L (ref 21–32)
COLOR UR: ABNORMAL
CREAT SERPL-MCNC: 2.14 MG/DL (ref 0.5–1.05)
CREAT UR-MCNC: 104.1 MG/DL (ref 20–320)
CREAT UR-MCNC: 104.6 MG/DL (ref 20–320)
EGFRCR SERPLBLD CKD-EPI 2021: 27 ML/MIN/1.73M*2
EOSINOPHIL # BLD AUTO: 0.09 X10*3/UL (ref 0–0.7)
EOSINOPHIL NFR BLD AUTO: 2 %
ERYTHROCYTE [DISTWIDTH] IN BLOOD BY AUTOMATED COUNT: 14.4 % (ref 11.5–14.5)
GLUCOSE SERPL-MCNC: 130 MG/DL (ref 74–99)
GLUCOSE UR STRIP.AUTO-MCNC: ABNORMAL MG/DL
HCT VFR BLD AUTO: 38.8 % (ref 36–46)
HGB BLD-MCNC: 12.7 G/DL (ref 12–16)
IMM GRANULOCYTES # BLD AUTO: 0.02 X10*3/UL (ref 0–0.7)
IMM GRANULOCYTES NFR BLD AUTO: 0.4 % (ref 0–0.9)
KETONES UR STRIP.AUTO-MCNC: NEGATIVE MG/DL
LEUKOCYTE ESTERASE UR QL STRIP.AUTO: NEGATIVE
LYMPHOCYTES # BLD AUTO: 0.74 X10*3/UL (ref 1.2–4.8)
LYMPHOCYTES NFR BLD AUTO: 16.1 %
MCH RBC QN AUTO: 28 PG (ref 26–34)
MCHC RBC AUTO-ENTMCNC: 32.7 G/DL (ref 32–36)
MCV RBC AUTO: 86 FL (ref 80–100)
MICROALBUMIN UR-MCNC: 499.3 MG/L
MICROALBUMIN/CREAT UR: 479.6 UG/MG CREAT
MONOCYTES # BLD AUTO: 0.18 X10*3/UL (ref 0.1–1)
MONOCYTES NFR BLD AUTO: 3.9 %
NEUTROPHILS # BLD AUTO: 3.54 X10*3/UL (ref 1.2–7.7)
NEUTROPHILS NFR BLD AUTO: 76.7 %
NITRITE UR QL STRIP.AUTO: NEGATIVE
NRBC BLD-RTO: 0 /100 WBCS (ref 0–0)
PH UR STRIP.AUTO: 6 [PH]
PHOSPHATE SERPL-MCNC: 2.6 MG/DL (ref 2.5–4.9)
PLATELET # BLD AUTO: 332 X10*3/UL (ref 150–450)
POTASSIUM SERPL-SCNC: 4.6 MMOL/L (ref 3.5–5.3)
PROT SERPL-MCNC: 8 G/DL (ref 6.4–8.2)
PROT UR STRIP.AUTO-MCNC: ABNORMAL MG/DL
PROT UR-ACNC: 125 MG/DL (ref 5–24)
PROT/CREAT UR: 1.2 MG/MG CREAT (ref 0–0.17)
RBC # BLD AUTO: 4.53 X10*6/UL (ref 4–5.2)
RBC # UR STRIP.AUTO: NEGATIVE MG/DL
RBC #/AREA URNS AUTO: ABNORMAL /HPF
SODIUM SERPL-SCNC: 139 MMOL/L (ref 136–145)
SP GR UR STRIP.AUTO: 1.01
SQUAMOUS #/AREA URNS AUTO: ABNORMAL /HPF
UROBILINOGEN UR STRIP.AUTO-MCNC: NORMAL MG/DL
WBC # BLD AUTO: 4.6 X10*3/UL (ref 4.4–11.3)
WBC #/AREA URNS AUTO: ABNORMAL /HPF

## 2025-03-10 PROCEDURE — 84100 ASSAY OF PHOSPHORUS: CPT | Performed by: INTERNAL MEDICINE

## 2025-03-10 PROCEDURE — 3008F BODY MASS INDEX DOCD: CPT | Performed by: INTERNAL MEDICINE

## 2025-03-10 PROCEDURE — 4010F ACE/ARB THERAPY RXD/TAKEN: CPT | Performed by: INTERNAL MEDICINE

## 2025-03-10 PROCEDURE — 86300 IMMUNOASSAY TUMOR CA 15-3: CPT

## 2025-03-10 PROCEDURE — 81001 URINALYSIS AUTO W/SCOPE: CPT | Performed by: INTERNAL MEDICINE

## 2025-03-10 PROCEDURE — 36415 COLL VENOUS BLD VENIPUNCTURE: CPT

## 2025-03-10 PROCEDURE — 3078F DIAST BP <80 MM HG: CPT | Performed by: INTERNAL MEDICINE

## 2025-03-10 PROCEDURE — 82043 UR ALBUMIN QUANTITATIVE: CPT | Performed by: INTERNAL MEDICINE

## 2025-03-10 PROCEDURE — 85025 COMPLETE CBC W/AUTO DIFF WBC: CPT | Performed by: INTERNAL MEDICINE

## 2025-03-10 PROCEDURE — 3074F SYST BP LT 130 MM HG: CPT | Performed by: INTERNAL MEDICINE

## 2025-03-10 PROCEDURE — 82570 ASSAY OF URINE CREATININE: CPT | Performed by: INTERNAL MEDICINE

## 2025-03-10 PROCEDURE — 99204 OFFICE O/P NEW MOD 45 MIN: CPT | Performed by: INTERNAL MEDICINE

## 2025-03-10 PROCEDURE — 84075 ASSAY ALKALINE PHOSPHATASE: CPT

## 2025-03-10 PROCEDURE — 82565 ASSAY OF CREATININE: CPT | Mod: 59 | Performed by: INTERNAL MEDICINE

## 2025-03-10 RX ORDER — AMLODIPINE BESYLATE 5 MG/1
5 TABLET ORAL DAILY
Qty: 30 TABLET | Refills: 5 | Status: SHIPPED | OUTPATIENT
Start: 2025-03-10 | End: 2025-09-06

## 2025-03-10 RX ORDER — ALBUTEROL SULFATE 0.83 MG/ML
3 SOLUTION RESPIRATORY (INHALATION) AS NEEDED
OUTPATIENT
Start: 2025-05-11

## 2025-03-10 RX ORDER — EPINEPHRINE 0.3 MG/.3ML
0.3 INJECTION SUBCUTANEOUS EVERY 5 MIN PRN
OUTPATIENT
Start: 2025-05-11

## 2025-03-10 RX ORDER — FAMOTIDINE 10 MG/ML
20 INJECTION, SOLUTION INTRAVENOUS ONCE AS NEEDED
OUTPATIENT
Start: 2025-05-11

## 2025-03-10 RX ORDER — DIPHENHYDRAMINE HYDROCHLORIDE 50 MG/ML
50 INJECTION INTRAMUSCULAR; INTRAVENOUS AS NEEDED
OUTPATIENT
Start: 2025-05-11

## 2025-03-10 ASSESSMENT — PAIN SCALES - GENERAL: PAINLEVEL_OUTOF10: 0-NO PAIN

## 2025-03-10 NOTE — LETTER
March 10, 2025     Ruby Stewart, APRN-CNP  [address]  [facility] [address]    Patient: Iva Pollock   YOB: 1970   Date of Visit: 3/10/2025       Dear Dr. Ruby Stewart, APRN-CNP:    Thank you for referring Iva Pollock to me for evaluation. Below are my notes for this consultation.  If you have questions, please do not hesitate to call me. I look forward to following your patient along with you.       Sincerely,     Pritesh Bryant MD      CC: No Recipients  ______________________________________________________________________________________    Nephrology Outpatient New Patient Visit Note    Chief Concern:  THANG    History Of Present Illness  Iva Pollock is a 54 y.o. female with a history of  HTN, T2D, obesity, recently diagnosed Malignant neoplasm of left breast, estrogen receptor positive, Clinical: Stage IV . Breast Ca tx: Anastrozole since 1/13/25, goserelin 1/13/25,  and ribociclib (kisqali) since 1/27/25  - had decrease appetite, nausea, wt loss (~5K), since she started taking kisqali on January  - on losartan since last year, metoprolol more recent introduction, also metformin with lowering HA1C  - baseline Scr 0.9, Scr jensen to 2.37 on 2/17 -kisqali placed on hold-, last Scr down to 1.6 on 2/26. BUN jensen to 31 from 10-15 baseline   Normal CBC , no eosinophilia  Normal UA from  2/29  Normal Kidney US from Feb  - NSAID rare use, once a week prn. Had a itchy rash on her back that started days before initial blood with high Scr, revolved by now   - she is back on kisqali since 3/5, doing OK and trying to stay well hydrated, not much nausea this time, has zofram. drinking now 64 oz/day    Past Medical History  She has a past medical history of Diabetes mellitus, type 2 (Multi), Gout, unspecified (07/26/2014), Other conditions influencing health status (12/26/2013), Other general symptoms and signs (06/04/2019), Pain in left shoulder (09/19/2016), Pain in right foot  "(06/12/2016), Pain in right hip (09/19/2016), Pain in unspecified foot (07/26/2014), Pneumonia, unspecified organism (03/13/2016), and Thrombocytopenia (CMS-HCC).  Patient Active Problem List   Diagnosis   • Fever   • Head ache   • Obesity (BMI 30-39.9)   • Polyphagia   • Suspected dengue fever   • Low platelet count (CMS-HCC)   • Type 2 diabetes mellitus with hyperglycemia, without long-term current use of insulin   • Malignant neoplasm of central portion of left breast in female, estrogen receptor positive   • Malignant neoplasm metastatic to bone (Multi)   • Encounter for antineoplastic chemotherapy   • Encounter for monitoring aromatase inhibitor therapy   • Suppression of ovarian secretion   • Encounter for monitoring bisphosphonate therapy   • Chemotherapy induced nausea and vomiting   • Chemotherapy-induced fatigue   • Chemotherapy induced diarrhea       Surgical History  She has a past surgical history that includes Appendectomy (09/19/2016) and Cholecystectomy.     Social History  She reports that she has never smoked. She has never used smokeless tobacco. She reports current alcohol use of about 1.0 standard drink of alcohol per week. She reports that she does not use drugs.    Family History  Family History   Problem Relation Name Age of Onset   • Diabetes Mother     • Hypertension Father     • Throat cancer Mother's Brother     • Diabetes Maternal Grandmother     • Diabetes Maternal Grandfather     • Diabetes Paternal Grandmother     • Diabetes Paternal Grandfather          Allergies  Sulfa (sulfonamide antibiotics) and Sulfamethoxazole-trimethoprim    Review of Systems  A 12-point review of systems was performed and noted be negative except for that which was mentioned in the history of present illness     Last Recorded Vitals  Weight 78.9 kg (174 lb).   /77 (BP Location: Right arm, Patient Position: Sitting, BP Cuff Size: Large adult)   Pulse 58   Ht 1.6 m (5' 3\")   Wt 78.9 kg (174 lb)   BMI " 30.82 kg/m²      Physical Exam:  Constitutional:  No acute distress  Respiration:  Breathing comfortably, no stridor  Cardiovascular:  No clubbing/cyanosis/edema in hands  Eyes:  EOM intact, sclera normal  Neuro:  Alert and oriented times 3, Cranial nerves II-XII grossly intact and symmetric bilaterally. No asterixis  Head and Face:  Symmetric facial features, no masses or lesions, sinuses non-tender to palpation  Neck/Lymph:  No LAD, no thyroid masses, trachea midline  Skin:  skin is without visible rash  Psych:  Alert and oriented with appropriate mood and affect      Medications:  Medication Documentation Review Audit       Reviewed by DEANNA Ibarra (Medical Assistant) on 03/03/25 at 0955      Medication Order Taking? Sig Documenting Provider Last Dose Status   anastrozole (Arimidex) 1 mg tablet [id number]  Take 1 tablet (1 mg total) by mouth once daily.  Swallow whole with a drink of water. Ruby Stewart, APRN-CNP  Active   blood sugar diagnostic (Accu-Chek Guide test strips) strip [id number] No 1 strip 2 times a day. Rachael Avery MD Taking Active   blood-glucose meter (Accu-Chek Guide Glucose Meter) misc [id number] No Use as directed to test blood sugar daily Rachael Avery MD Taking Active   lancets (Accu-Chek Fastclix Lancet Drum) misc [id number] No Use 1 lancet to check blood sugar twice a day as directed. Rachael Avery MD Taking Active   losartan (Cozaar) 100 mg tablet [id number]  Take 1 tablet (100 mg) by mouth once daily. Nancy Belcher MD  Active   metFORMIN XR (Glucophage-XR) 500 mg 24 hr tablet [id number]  Take 2 tablets (1,000 mg) by mouth 2 times daily (morning and late afternoon). Do not crush, chew, or split. Nancy Belcher MD  Active   metoprolol succinate XL (Toprol-XL) 50 mg 24 hr tablet [id number]  Take 1 tablet (50 mg) by mouth once daily. Do not crush or chew. Nancy Belcher MD  Active   metroNIDAZOLE (Flagyl) 500 mg tablet [id number]  500mg tablet, crush  "tablet and sprinkle over open wound twice daily Isabela Solomon, APRN-CNP  Active   ondansetron (Zofran) 8 mg tablet [id number]  Take 1 tablet (8 mg) by mouth every 8 hours if needed for nausea or vomiting for up to 21 days. Ivan Marinelli MD  Active   ribociclib (Kisqali) 2 x 200 mg tablet therapy pack [id number]  Take 2 tablets (400 mg total) by mouth in the morning for 21 days, followed by 7 days off per 28-day treatment cycle. Ivan Marinelli MD  Active   rosuvastatin (Crestor) 10 mg tablet [id number]  Take 1 tablet (10 mg) by mouth once daily. Nancy Belcher MD  Active                    Relevant Results Recent labs reviewed in the EMR.  Lab Results   Component Value Date    HGB 11.8 (L) 02/26/2025    HGB 12.8 02/17/2025    HGB 13.2 01/06/2025    MCV 85 02/26/2025    MCV 93 02/17/2025    MCV 84 01/06/2025     02/26/2025     (L) 02/17/2025     01/06/2025       No results found for: \"FERRITIN\", \"IRON\"    Lab Results   Component Value Date     02/26/2025    K 4.3 02/26/2025     02/26/2025    BUN 12 02/26/2025    CREATININE 1.60 (H) 02/26/2025         Imaging:  I personally reviewed and this is my impression:        Assessment/Plan  1. THANG (acute kidney injury) (CMS-HCC) (Primary)  - Improving till last labs. Possibly related to volume depletion compounded by RAASi use vs Pseudo-THANG and/or ATN due to ribociclib vs AIN -much less likely based on rapid improvement-.   - She is drinking fluids much better now, no rash, back on ribociclib at lower dose (if good eGFR with cystatin C adjust dose). Will get repeat labs and recommending to also measure concurrent cystatin C to see if correlates with Scr. If she becomes nauseous again and with poor fluid intake to hold losartan that will keep for now. Also getting new urine studies with ACR/PCR. Will communicate with lab results, RTO 1 month  - Referral to Nephrology  - Referral to Nephrology  - Renal function panel; Future  - " Cystatin C; Future  - Urinalysis with Reflex Microscopic; Future  - Albumin-Creatinine Ratio, Urine Random; Future  - Protein, Urine Random; Future  - CBC and Auto Differential; Future  - Cystatin C; Future  - Renal function panel; Future  - Follow Up In Nephrology; Future  - Renal function panel  - Cystatin C  - Urinalysis with Reflex Microscopic  - Albumin-Creatinine Ratio, Urine Random  - Protein, Urine Random  - CBC and Auto Differential    2. Malignant neoplasm metastatic to bone (Multi)  - Referral to Nephrology    3. Encounter for monitoring aromatase inhibitor therapy  - Referral to Nephrology    4. Encounter for antineoplastic chemotherapy  - Referral to Nephrology     Pritesh Bryant MD  Division of Nephrology and Hypertension

## 2025-03-10 NOTE — PROGRESS NOTES
Nephrology Outpatient New Patient Visit Note    Chief Concern:  THANG    History Of Present Illness  Iva Pollock is a 54 y.o. female with a history of  HTN, T2D, obesity, recently diagnosed Malignant neoplasm of left breast, estrogen receptor positive, Clinical: Stage IV . Breast Ca tx: Anastrozole since 1/13/25, goserelin 1/13/25,  and ribociclib (kisqali) since 1/27/25  - had decrease appetite, nausea, wt loss (~5K), since she started taking kisqali on January  - on losartan since last year, metoprolol more recent introduction, also metformin with lowering HA1C  - baseline Scr 0.9, Scr jensen to 2.37 on 2/17 -kisqali placed on hold-, last Scr down to 1.6 on 2/26. BUN jensen to 31 from 10-15 baseline   Normal CBC , no eosinophilia  Normal UA from  2/29  Normal Kidney US from Feb  - NSAID rare use, once a week prn. Had a itchy rash on her back that started days before initial blood with high Scr, revolved by now   - she is back on kisqali since 3/5, doing OK and trying to stay well hydrated, not much nausea this time, has zofram. drinking now 64 oz/day    Past Medical History  She has a past medical history of Diabetes mellitus, type 2 (Multi), Gout, unspecified (07/26/2014), Other conditions influencing health status (12/26/2013), Other general symptoms and signs (06/04/2019), Pain in left shoulder (09/19/2016), Pain in right foot (06/12/2016), Pain in right hip (09/19/2016), Pain in unspecified foot (07/26/2014), Pneumonia, unspecified organism (03/13/2016), and Thrombocytopenia (CMS-HCC).  Patient Active Problem List   Diagnosis    Fever    Head ache    Obesity (BMI 30-39.9)    Polyphagia    Suspected dengue fever    Low platelet count (CMS-HCC)    Type 2 diabetes mellitus with hyperglycemia, without long-term current use of insulin    Malignant neoplasm of central portion of left breast in female, estrogen receptor positive    Malignant neoplasm metastatic to bone (Multi)    Encounter for antineoplastic  "chemotherapy    Encounter for monitoring aromatase inhibitor therapy    Suppression of ovarian secretion    Encounter for monitoring bisphosphonate therapy    Chemotherapy induced nausea and vomiting    Chemotherapy-induced fatigue    Chemotherapy induced diarrhea       Surgical History  She has a past surgical history that includes Appendectomy (09/19/2016) and Cholecystectomy.     Social History  She reports that she has never smoked. She has never used smokeless tobacco. She reports current alcohol use of about 1.0 standard drink of alcohol per week. She reports that she does not use drugs.    Family History  Family History   Problem Relation Name Age of Onset    Diabetes Mother      Hypertension Father      Throat cancer Mother's Brother      Diabetes Maternal Grandmother      Diabetes Maternal Grandfather      Diabetes Paternal Grandmother      Diabetes Paternal Grandfather          Allergies  Sulfa (sulfonamide antibiotics) and Sulfamethoxazole-trimethoprim    Review of Systems  A 12-point review of systems was performed and noted be negative except for that which was mentioned in the history of present illness     Last Recorded Vitals  Weight 78.9 kg (174 lb).   /77 (BP Location: Right arm, Patient Position: Sitting, BP Cuff Size: Large adult)   Pulse 58   Ht 1.6 m (5' 3\")   Wt 78.9 kg (174 lb)   BMI 30.82 kg/m²      Physical Exam:  Constitutional:  No acute distress  Respiration:  Breathing comfortably, no stridor  Cardiovascular:  No clubbing/cyanosis/edema in hands  Eyes:  EOM intact, sclera normal  Neuro:  Alert and oriented times 3, Cranial nerves II-XII grossly intact and symmetric bilaterally. No asterixis  Head and Face:  Symmetric facial features, no masses or lesions, sinuses non-tender to palpation  Neck/Lymph:  No LAD, no thyroid masses, trachea midline  Skin:  skin is without visible rash  Psych:  Alert and oriented with appropriate mood and affect      Medications:  Medication " Documentation Review Audit       Reviewed by DEANNA Ibarra (Medical Assistant) on 03/03/25 at 0955      Medication Order Taking? Sig Documenting Provider Last Dose Status   anastrozole (Arimidex) 1 mg tablet [id number]  Take 1 tablet (1 mg total) by mouth once daily.  Swallow whole with a drink of water. Ruby Stewart APRN-CNP  Active   blood sugar diagnostic (Accu-Chek Guide test strips) strip [id number] No 1 strip 2 times a day. Rachael Avery MD Taking Active   blood-glucose meter (Accu-Chek Guide Glucose Meter) misc [id number] No Use as directed to test blood sugar daily Rachael Avery MD Taking Active   lancets (Accu-Chek Fastclix Lancet Drum) misc [id number] No Use 1 lancet to check blood sugar twice a day as directed. Rachael Avery MD Taking Active   losartan (Cozaar) 100 mg tablet [id number]  Take 1 tablet (100 mg) by mouth once daily. Nancy Belcher MD  Active   metFORMIN XR (Glucophage-XR) 500 mg 24 hr tablet [id number]  Take 2 tablets (1,000 mg) by mouth 2 times daily (morning and late afternoon). Do not crush, chew, or split. Nancy Belcher MD  Active   metoprolol succinate XL (Toprol-XL) 50 mg 24 hr tablet [id number]  Take 1 tablet (50 mg) by mouth once daily. Do not crush or chew. Nancy Belcher MD  Active   metroNIDAZOLE (Flagyl) 500 mg tablet [id number]  500mg tablet, crush tablet and sprinkle over open wound twice daily Isabela Solomon, APRN-CNP  Active   ondansetron (Zofran) 8 mg tablet [id number]  Take 1 tablet (8 mg) by mouth every 8 hours if needed for nausea or vomiting for up to 21 days. Ivan Marinelli MD  Active   ribociclib (Kisqali) 2 x 200 mg tablet therapy pack [id number]  Take 2 tablets (400 mg total) by mouth in the morning for 21 days, followed by 7 days off per 28-day treatment cycle. Ivan Marinelli MD  Active   rosuvastatin (Crestor) 10 mg tablet [id number]  Take 1 tablet (10 mg) by mouth once daily. Nancy Belcher MD  Active               "      Relevant Results Recent labs reviewed in the EMR.  Lab Results   Component Value Date    HGB 11.8 (L) 02/26/2025    HGB 12.8 02/17/2025    HGB 13.2 01/06/2025    MCV 85 02/26/2025    MCV 93 02/17/2025    MCV 84 01/06/2025     02/26/2025     (L) 02/17/2025     01/06/2025       No results found for: \"FERRITIN\", \"IRON\"    Lab Results   Component Value Date     02/26/2025    K 4.3 02/26/2025     02/26/2025    BUN 12 02/26/2025    CREATININE 1.60 (H) 02/26/2025         Imaging:  I personally reviewed and this is my impression:        Assessment/Plan   1. THANG (acute kidney injury) (CMS-HCC) (Primary)  - Improving till last labs. Possibly related to volume depletion compounded by RAASi use vs Pseudo-THANG and/or ATN due to ribociclib vs AIN -much less likely based on rapid improvement-.   - She is drinking fluids much better now, no rash, back on ribociclib at lower dose (if good eGFR with cystatin C adjust dose). Will get repeat labs and recommending to also measure concurrent cystatin C to see if correlates with Scr. If she becomes nauseous again and with poor fluid intake to hold losartan that will keep for now. Also getting new urine studies with ACR/PCR. Will communicate with lab results, RTO 1 month  - Referral to Nephrology  - Referral to Nephrology  - Renal function panel; Future  - Cystatin C; Future  - Urinalysis with Reflex Microscopic; Future  - Albumin-Creatinine Ratio, Urine Random; Future  - Protein, Urine Random; Future  - CBC and Auto Differential; Future  - Cystatin C; Future  - Renal function panel; Future  - Follow Up In Nephrology; Future  - Renal function panel  - Cystatin C  - Urinalysis with Reflex Microscopic  - Albumin-Creatinine Ratio, Urine Random  - Protein, Urine Random  - CBC and Auto Differential    2. Malignant neoplasm metastatic to bone (Multi)  - Referral to Nephrology    3. Encounter for monitoring aromatase inhibitor therapy  - Referral to " Nephrology    4. Encounter for antineoplastic chemotherapy  - Referral to Nephrology     Pritesh Bryant MD  Division of Nephrology and Hypertension

## 2025-03-10 NOTE — TELEPHONE ENCOUNTER
Call to patient to review labs- will again hold Kisqali and will repeat labs in 1 week. Will discontinue dose reduced Zometa and change therapy to every 12 week Xgeva to be dosed on 5/12/25. Questions and apts reviewed

## 2025-03-11 LAB
CREAT SERPL-MCNC: 2.16 MG/DL (ref 0.59–1.01)
CYSTATIN C SERPL-MCNC: 1.59 MG/L (ref 0.61–0.95)
EGFRCR-CYS SERPLBLD CKD-EPI 2021: 33 ML/MIN/{1.73_M2}

## 2025-03-13 ENCOUNTER — SOCIAL WORK (OUTPATIENT)
Dept: CASE MANAGEMENT | Facility: HOSPITAL | Age: 55
End: 2025-03-13
Payer: COMMERCIAL

## 2025-03-17 ENCOUNTER — ONCOLOGY MEDICATION OUTREACH (OUTPATIENT)
Dept: HEMATOLOGY/ONCOLOGY | Facility: CLINIC | Age: 55
End: 2025-03-17

## 2025-03-17 ENCOUNTER — LAB (OUTPATIENT)
Dept: LAB | Facility: HOSPITAL | Age: 55
End: 2025-03-17
Payer: COMMERCIAL

## 2025-03-17 ENCOUNTER — OFFICE VISIT (OUTPATIENT)
Dept: HEMATOLOGY/ONCOLOGY | Facility: HOSPITAL | Age: 55
End: 2025-03-17
Payer: COMMERCIAL

## 2025-03-17 VITALS
DIASTOLIC BLOOD PRESSURE: 87 MMHG | RESPIRATION RATE: 16 BRPM | BODY MASS INDEX: 30.65 KG/M2 | SYSTOLIC BLOOD PRESSURE: 127 MMHG | TEMPERATURE: 97.5 F | WEIGHT: 173 LBS | HEART RATE: 65 BPM | OXYGEN SATURATION: 99 %

## 2025-03-17 DIAGNOSIS — Z51.81 ENCOUNTER FOR MONITORING BISPHOSPHONATE THERAPY: ICD-10-CM

## 2025-03-17 DIAGNOSIS — Z79.83 ENCOUNTER FOR MONITORING BISPHOSPHONATE THERAPY: ICD-10-CM

## 2025-03-17 DIAGNOSIS — Z51.81 ENCOUNTER FOR MONITORING AROMATASE INHIBITOR THERAPY: ICD-10-CM

## 2025-03-17 DIAGNOSIS — R53.83 CHEMOTHERAPY-INDUCED FATIGUE: ICD-10-CM

## 2025-03-17 DIAGNOSIS — Z51.11 ENCOUNTER FOR ANTINEOPLASTIC CHEMOTHERAPY: ICD-10-CM

## 2025-03-17 DIAGNOSIS — T45.1X5A CHEMOTHERAPY-INDUCED FATIGUE: ICD-10-CM

## 2025-03-17 DIAGNOSIS — R11.2 CHEMOTHERAPY INDUCED NAUSEA AND VOMITING: ICD-10-CM

## 2025-03-17 DIAGNOSIS — Z17.0 MALIGNANT NEOPLASM OF CENTRAL PORTION OF LEFT BREAST IN FEMALE, ESTROGEN RECEPTOR POSITIVE: ICD-10-CM

## 2025-03-17 DIAGNOSIS — T45.1X5A CHEMOTHERAPY INDUCED DIARRHEA: ICD-10-CM

## 2025-03-17 DIAGNOSIS — R79.89 ELEVATED SERUM CREATININE: ICD-10-CM

## 2025-03-17 DIAGNOSIS — C79.51 MALIGNANT NEOPLASM METASTATIC TO BONE (MULTI): ICD-10-CM

## 2025-03-17 DIAGNOSIS — K52.1 CHEMOTHERAPY INDUCED DIARRHEA: ICD-10-CM

## 2025-03-17 DIAGNOSIS — Z79.811 ENCOUNTER FOR MONITORING AROMATASE INHIBITOR THERAPY: ICD-10-CM

## 2025-03-17 DIAGNOSIS — C79.51 MALIGNANT NEOPLASM METASTATIC TO BONE (MULTI): Primary | ICD-10-CM

## 2025-03-17 DIAGNOSIS — E28.39 SUPPRESSION OF OVARIAN SECRETION: ICD-10-CM

## 2025-03-17 DIAGNOSIS — T45.1X5A CHEMOTHERAPY INDUCED NAUSEA AND VOMITING: ICD-10-CM

## 2025-03-17 DIAGNOSIS — C50.112 MALIGNANT NEOPLASM OF CENTRAL PORTION OF LEFT BREAST IN FEMALE, ESTROGEN RECEPTOR POSITIVE: ICD-10-CM

## 2025-03-17 DIAGNOSIS — N17.9 AKI (ACUTE KIDNEY INJURY): ICD-10-CM

## 2025-03-17 LAB
ALBUMIN SERPL BCP-MCNC: 4.6 G/DL (ref 3.4–5)
ALP SERPL-CCNC: 76 U/L (ref 33–110)
ALT SERPL W P-5'-P-CCNC: 12 U/L (ref 7–45)
ANION GAP SERPL CALC-SCNC: 16 MMOL/L (ref 10–20)
AST SERPL W P-5'-P-CCNC: 22 U/L (ref 9–39)
BASOPHILS # BLD AUTO: 0.05 X10*3/UL (ref 0–0.1)
BASOPHILS NFR BLD AUTO: 1.1 %
BILIRUB SERPL-MCNC: 0.6 MG/DL (ref 0–1.2)
BUN SERPL-MCNC: 12 MG/DL (ref 6–23)
CALCIUM SERPL-MCNC: 9.4 MG/DL (ref 8.6–10.3)
CHLORIDE SERPL-SCNC: 105 MMOL/L (ref 98–107)
CO2 SERPL-SCNC: 22 MMOL/L (ref 21–32)
CREAT SERPL-MCNC: 1.85 MG/DL (ref 0.5–1.05)
EGFRCR SERPLBLD CKD-EPI 2021: 32 ML/MIN/1.73M*2
EOSINOPHIL # BLD AUTO: 0.1 X10*3/UL (ref 0–0.7)
EOSINOPHIL NFR BLD AUTO: 2.1 %
ERYTHROCYTE [DISTWIDTH] IN BLOOD BY AUTOMATED COUNT: 14.6 % (ref 11.5–14.5)
GLUCOSE SERPL-MCNC: 106 MG/DL (ref 74–99)
HCT VFR BLD AUTO: 38.6 % (ref 36–46)
HGB BLD-MCNC: 12.5 G/DL (ref 12–16)
IMM GRANULOCYTES # BLD AUTO: 0.01 X10*3/UL (ref 0–0.7)
IMM GRANULOCYTES NFR BLD AUTO: 0.2 % (ref 0–0.9)
LYMPHOCYTES # BLD AUTO: 0.94 X10*3/UL (ref 1.2–4.8)
LYMPHOCYTES NFR BLD AUTO: 19.8 %
MCH RBC QN AUTO: 28.3 PG (ref 26–34)
MCHC RBC AUTO-ENTMCNC: 32.4 G/DL (ref 32–36)
MCV RBC AUTO: 87 FL (ref 80–100)
MONOCYTES # BLD AUTO: 0.39 X10*3/UL (ref 0.1–1)
MONOCYTES NFR BLD AUTO: 8.2 %
NEUTROPHILS # BLD AUTO: 3.26 X10*3/UL (ref 1.2–7.7)
NEUTROPHILS NFR BLD AUTO: 68.6 %
NRBC BLD-RTO: 0 /100 WBCS (ref 0–0)
PHOSPHATE SERPL-MCNC: 2.9 MG/DL (ref 2.5–4.9)
PLATELET # BLD AUTO: 318 X10*3/UL (ref 150–450)
POTASSIUM SERPL-SCNC: 4.6 MMOL/L (ref 3.5–5.3)
PROT SERPL-MCNC: 8.2 G/DL (ref 6.4–8.2)
RBC # BLD AUTO: 4.42 X10*6/UL (ref 4–5.2)
SODIUM SERPL-SCNC: 138 MMOL/L (ref 136–145)
WBC # BLD AUTO: 4.8 X10*3/UL (ref 4.4–11.3)

## 2025-03-17 PROCEDURE — 36415 COLL VENOUS BLD VENIPUNCTURE: CPT | Performed by: INTERNAL MEDICINE

## 2025-03-17 PROCEDURE — 85025 COMPLETE CBC W/AUTO DIFF WBC: CPT

## 2025-03-17 PROCEDURE — 99215 OFFICE O/P EST HI 40 MIN: CPT | Performed by: NURSE PRACTITIONER

## 2025-03-17 PROCEDURE — 3074F SYST BP LT 130 MM HG: CPT | Performed by: NURSE PRACTITIONER

## 2025-03-17 PROCEDURE — 3079F DIAST BP 80-89 MM HG: CPT | Performed by: NURSE PRACTITIONER

## 2025-03-17 PROCEDURE — 80053 COMPREHEN METABOLIC PANEL: CPT

## 2025-03-17 PROCEDURE — 84100 ASSAY OF PHOSPHORUS: CPT | Performed by: INTERNAL MEDICINE

## 2025-03-17 PROCEDURE — 3062F POS MACROALBUMINURIA REV: CPT | Performed by: NURSE PRACTITIONER

## 2025-03-17 ASSESSMENT — PATIENT HEALTH QUESTIONNAIRE - PHQ9
1. LITTLE INTEREST OR PLEASURE IN DOING THINGS: NOT AT ALL
2. FEELING DOWN, DEPRESSED OR HOPELESS: NOT AT ALL
SUM OF ALL RESPONSES TO PHQ9 QUESTIONS 1 & 2: 0

## 2025-03-17 ASSESSMENT — PAIN SCALES - GENERAL: PAINLEVEL_OUTOF10: 0-NO PAIN

## 2025-03-17 NOTE — PROGRESS NOTES
[facility]  Location: [facility]      BREAST CANCER DIAGNOSIS  Dec 2024 Malignant neoplasm of central portion of left breast in female, estrogen receptor positive, Clinical: Stage IV (cT4b, cN1(f), cM1, G2, ER+, MS+, HER2-)         CURRENT THERAPY  Anastrozole since 1/13/25, first goserelin 1/13/25,  1/27/25 600 mg  ribociclib, 3/3/25 Zometa.  Jan 2025 Tempus with PIK3CA mutation    HISTORY OF PRESENT ILLNESS    Iva Pollock is a 54 y.o. woman with recent diagnosis of left sided breast cancer ER/MS+ with ulcerating mass which has been there for a while.  Initial staging scans showed some evidence of bone mets and confirmed by cerianna estradiol pet. Patient has noted non-radiating hip and back pain for the last 12 months.    Yash presents today for IV breast cancer tx follow up. Today I have reviewed with the patient I will be conducting a clinical physical breast exam. Patient has declined a second medical professional today during the exam as a chapperone. She is accompanied today by her  Jose Luis.     She is compliant on daily anastrozole since 1/13/25. Due to issues with kidney functions Kisqali is currently on hold and has established with nephrology due to changes in Kidney function. After 1st 2 week hold initiated on 2/17 she resumed again at 400 mg dose on 3/5. She reports the lower dose did not have any side effects but was only able to take this for 6 days before holding it again on  3/10. She reports still had watery diarrhea. Currently no issues with diarrhea.     She reports the first zometa did not go well- reports fever 101, body aches and joint aches and malaise. She felt better within 48 hrs. She did not take Claritin until 24 hrs following.     She reports fatigue has essentially resolved with pauses of kisqali. She has returned to walking for exercise. She reports is working hard to drink 2 liters of water. She reports appetite is less hungry due to drinking so much  water. She reports since her diagnosis has lost about 8-10 lbs.     She denies any chest pain, no cough issues or sob. She continues to check her BP at home, notices it is a bit lower in a normal range of 120's / 80's vs prior of 150-160's / 80-high 90's. Continues to check it at home.     She reports a headache for 2 days after Zometa. She otherwise denies any vision changes or headache issues, dizziness or loss of balance, no falls. She denies any baseline neuropathy as she has DM II.     She reports baseline lower back and hip pain that has not changed- she will use OTC medication not even weekly with ibuprofen/ tylenol. Will take 975mg tylenol / 600 mg ibuprofen as needed however has not needed any since Zometa infusions. She otherwise denies any new or unexplained bone aches or pains.     She reports baseline left chest wall lesion continues to be dry and seems to be shrinking. , no more drainage or bleeding. She reports now minimal twinges of pain in the left chest wall lesion. She otherwise denies any skin lesions or masses, oral sores. She reports a mild skin eczema on her back on her lower back that is improving.     Last period was October 2024, denies any breakthrough bleeding     Review of Systems  ROS 14 points performed, See HPI for exceptions      Past Medical History:  has a past medical history of Diabetes mellitus, type 2 (Multi), Gout, unspecified (07/26/2014), Other conditions influencing health status (12/26/2013), Other general symptoms and signs (06/04/2019), Pain in left shoulder (09/19/2016), Pain in right foot (06/12/2016), Pain in right hip (09/19/2016), Pain in unspecified foot (07/26/2014), Pneumonia, unspecified organism (03/13/2016), and Thrombocytopenia (CMS-HCC).  Surgical History:   has a past surgical history that includes Appendectomy (09/19/2016) and Cholecystectomy.  Social History:   reports that she has never smoked. She has never used smokeless tobacco. She reports current  alcohol use of about 1.0 standard drink of alcohol per week. She reports that she does not use drugs.  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     GYN:     Family History:    Family History   Problem Relation Name Age of Onset    Diabetes Mother      Hypertension Father      Throat cancer Mother's Brother      Diabetes Maternal Grandmother      Diabetes Maternal Grandfather      Diabetes Paternal Grandmother      Diabetes Paternal Grandfather       Family Oncology History:  Cancer-related family history is not on file.      OBJECTIVE    VS / Pain:  /87 (BP Location: Right arm, Patient Position: Sitting, BP Cuff Size: Adult)   Pulse 65   Temp 36.4 °C (97.5 °F)   Resp 16   Wt 78.5 kg (173 lb)   SpO2 99%   BMI 30.65 kg/m²   BSA: 1.87 meters squared   Pain Scale: 3    Performance Status:  The ECOG performance scale today is ECO- Fully active, able to carry on all pre-disease performance w/o restriction.      Physical Exam  Constitutional: Well developed, awake/alert/oriented x4, no distress, alert and cooperative  EYES: Sclera clear  ENMT: mucous membranes moist, no apparent injury, no lesions seen  Head/Neck: Neck supple, no apparent injury, thyroid without mass or tenderness, No JVD, trachea midline, no bruits  Respiratory / Thoracic: Patent airways, clear to all lobes, normal breath sounds with good chest expansion, thorax symmetric.  Cardiovascular: Regular, rate and rhythm, no murmurs, 2+ equal pulses of the extremities, normal auscultated S 1and S 2  GI: Nondistended, soft, non-tender, no rebound tenderness or guarding, no masses palpable, no organomegaly, +BS, no bruits  Musculoskeletal: ROM intact, no joint swelling, normal strength, no spinal tenderness  Extremities: normal extremities, no cyanosis edema, contusions or wounds, no clubbing  Neurological: alert and oriented x4, intact senses, motor,  response and reflexes, normal strength  Breast: 4 to 4.5cm ulcerating lesion, now more flat- see media below. Palpable matted Lymph nodes left axilla, is smaller. No masses right breast.   Lymphatic: No cervical, supraclavicular, infraclavicular or axillary lymphadenopathy  Psychological: Appropriate and talkative mood and behavior  Skin: Warm and dry, no lesions, no rashes, no jaundice        Diagnostic Results   === 12/11/24 ===    CT ABDOMEN PELVIS W IV CONTRAST    - Impression -  Incompletely included necrotic left breast mass    No sign of active soft tissue metastatic disease in the included  lower chest. Note left axillary and some of the other thoracic lymph  node stations needing evaluation with breast cancer are not included  in the field of view on this exam of the abdomen. The included lower  chest on today's exam cannot serve as an initial staging exam for the  chest as it is not completely included in the field of view    Few lytic and few sclerotic osseous lesions, raising suspicion for  osseous metastatic disease from breast cancer as detailed above    No soft tissue metastatic disease in the abdomen or pelvis    Massive fibroid uterus extends up into the mid abdomen as detailed  above    The mass effect of the fibroid uterus is not causing hydronephrosis  on either side    MACRO:  None    Signed by: Michael Robbins 12/11/2024 2:15 PM  Dictation workstation:   [id number]     === 01/09/25 ===    NM PET CT CERIANNA TORSO    - Impression -  1. Left breast centrally necrotic mass consistent with known estrogen  receptor expressing malignancy.  2. Left axillary F-18 fluoroestradiol avid bernardino metastases.  3. Widespread estrogen receptor expressing osseous metastases as  described above.      I personally reviewed the images/study and I agree with the resident  Prasanna Garza's findings as stated. This study was interpreted  at [facility],  [place].    MACRO:  None      Signed by: Chapo Lewis 1/9/2025 3:39 PM  Dictation workstation:   [id number]     LABORATORY/PATHOLOGY DATA  Pending CBC, CMP     Lab on 03/10/2025   Component Date Value Ref Range Status    Glucose 03/10/2025 130 (H)  74 - 99 mg/dL Final    Sodium 03/10/2025 139  136 - 145 mmol/L Final    Potassium 03/10/2025 4.6  3.5 - 5.3 mmol/L Final    Chloride 03/10/2025 106  98 - 107 mmol/L Final    Bicarbonate 03/10/2025 23  21 - 32 mmol/L Final    Anion Gap 03/10/2025 15  10 - 20 mmol/L Final    Urea Nitrogen 03/10/2025 16  6 - 23 mg/dL Final    Creatinine 03/10/2025 2.14 (H)  0.50 - 1.05 mg/dL Final    eGFR 03/10/2025 27 (L)  >60 mL/min/1.73m*2 Final    Calcium 03/10/2025 9.1  8.6 - 10.3 mg/dL Final    Albumin 03/10/2025 4.5  3.4 - 5.0 g/dL Final    Alkaline Phosphatase 03/10/2025 69  33 - 110 U/L Final    Total Protein 03/10/2025 8.0  6.4 - 8.2 g/dL Final    AST 03/10/2025 17  9 - 39 U/L Final    Bilirubin, Total 03/10/2025 0.6  0.0 - 1.2 mg/dL Final    ALT 03/10/2025 11  7 - 45 U/L Final    CA 27.29 03/10/2025 73.2 (H)  0.0 - 38.6 U/mL Final   Office Visit on 03/10/2025   Component Date Value Ref Range Status    Cystatin C 03/10/2025 1.59 (H)  0.61 - 0.95 mg/L Final    Creatinine 03/10/2025 2.16 (H)  0.59 - 1.01 mg/dL Final    eGFR BY Cystatin C 03/10/2025 33 (L)  >=60 Final    Color, Urine 03/10/2025 Light-Yellow  Light-Yellow, Yellow, Dark-Yellow Final    Appearance, Urine 03/10/2025 Turbid (N)  Clear Final    Specific Gravity, Urine 03/10/2025 1.010  1.005 - 1.035 Final    pH, Urine 03/10/2025 6.0  5.0, 5.5, 6.0, 6.5, 7.0, 7.5, 8.0 Final    Protein, Urine 03/10/2025 100 (2+) (A)  NEGATIVE, 10 (TRACE), 20 (TRACE) mg/dL Final    Glucose, Urine 03/10/2025 100 (1+) (A)  Normal mg/dL Final    Blood, Urine 03/10/2025 NEGATIVE  NEGATIVE mg/dL Final    Ketones, Urine 03/10/2025 NEGATIVE  NEGATIVE mg/dL Final    Bilirubin, Urine 03/10/2025 NEGATIVE  NEGATIVE mg/dL Final    Urobilinogen, Urine  03/10/2025 Normal  Normal mg/dL Final    Nitrite, Urine 03/10/2025 NEGATIVE  NEGATIVE Final    Leukocyte Esterase, Urine 03/10/2025 NEGATIVE  NEGATIVE Final    Albumin, Urine Random 03/10/2025 499.3  Not established mg/L Final    Creatinine, Urine Random 03/10/2025 104.1  20.0 - 320.0 mg/dL Final    Albumin/Creatinine Ratio 03/10/2025 479.6 (H)  <30.0 ug/mg Creat Final    Total Protein, Urine Random 03/10/2025 125 (H)  5 - 24 mg/dL Final    Creatinine, Urine Random 03/10/2025 104.6  20.0 - 320.0 mg/dL Final    T. Protein/Creatinine Ratio 03/10/2025 1.20 (H)  0.00 - 0.17 mg/mg Creat Final    WBC 03/10/2025 4.6  4.4 - 11.3 x10*3/uL Final    nRBC 03/10/2025 0.0  0.0 - 0.0 /100 WBCs Final    RBC 03/10/2025 4.53  4.00 - 5.20 x10*6/uL Final    Hemoglobin 03/10/2025 12.7  12.0 - 16.0 g/dL Final    Hematocrit 03/10/2025 38.8  36.0 - 46.0 % Final    MCV 03/10/2025 86  80 - 100 fL Final    MCH 03/10/2025 28.0  26.0 - 34.0 pg Final    MCHC 03/10/2025 32.7  32.0 - 36.0 g/dL Final    RDW 03/10/2025 14.4  11.5 - 14.5 % Final    Platelets 03/10/2025 332  150 - 450 x10*3/uL Final    Neutrophils % 03/10/2025 76.7  40.0 - 80.0 % Final    Immature Granulocytes %, Automated 03/10/2025 0.4  0.0 - 0.9 % Final    Lymphocytes % 03/10/2025 16.1  13.0 - 44.0 % Final    Monocytes % 03/10/2025 3.9  2.0 - 10.0 % Final    Eosinophils % 03/10/2025 2.0  0.0 - 6.0 % Final    Basophils % 03/10/2025 0.9  0.0 - 2.0 % Final    Neutrophils Absolute 03/10/2025 3.54  1.20 - 7.70 x10*3/uL Final    Immature Granulocytes Absolute, Au* 03/10/2025 0.02  0.00 - 0.70 x10*3/uL Final    Lymphocytes Absolute 03/10/2025 0.74 (L)  1.20 - 4.80 x10*3/uL Final    Monocytes Absolute 03/10/2025 0.18  0.10 - 1.00 x10*3/uL Final    Eosinophils Absolute 03/10/2025 0.09  0.00 - 0.70 x10*3/uL Final    Basophils Absolute 03/10/2025 0.04  0.00 - 0.10 x10*3/uL Final    Phosphorus 03/10/2025 2.6  2.5 - 4.9 mg/dL Final    WBC, Urine 03/10/2025 6-10 (A)  1-5, NONE /HPF Final    RBC,  Urine 03/10/2025 3-5  NONE, 1-2, 3-5 /HPF Final    Squamous Epithelial Cells, Urine 03/10/2025 1-9 (SPARSE)  Reference range not established. /HPF Final   Lab on 02/26/2025   Component Date Value Ref Range Status    Glucose 02/26/2025 130 (H)  74 - 99 mg/dL Final    Sodium 02/26/2025 140  136 - 145 mmol/L Final    Potassium 02/26/2025 4.3  3.5 - 5.3 mmol/L Final    Chloride 02/26/2025 105  98 - 107 mmol/L Final    Bicarbonate 02/26/2025 27  21 - 32 mmol/L Final    Anion Gap 02/26/2025 12  10 - 20 mmol/L Final    Urea Nitrogen 02/26/2025 12  6 - 23 mg/dL Final    Creatinine 02/26/2025 1.60 (H)  0.50 - 1.05 mg/dL Final    eGFR 02/26/2025 38 (L)  >60 mL/min/1.73m*2 Final    Calcium 02/26/2025 9.2  8.6 - 10.3 mg/dL Final    Albumin 02/26/2025 4.2  3.4 - 5.0 g/dL Final    Alkaline Phosphatase 02/26/2025 67  33 - 110 U/L Final    Total Protein 02/26/2025 7.2  6.4 - 8.2 g/dL Final    AST 02/26/2025 22  9 - 39 U/L Final    Bilirubin, Total 02/26/2025 0.5  0.0 - 1.2 mg/dL Final    ALT 02/26/2025 16  7 - 45 U/L Final    WBC 02/26/2025 2.2 (L)  4.4 - 11.3 x10*3/uL Final    nRBC 02/26/2025 0.0  0.0 - 0.0 /100 WBCs Final    RBC 02/26/2025 4.20  4.00 - 5.20 x10*6/uL Final    Hemoglobin 02/26/2025 11.8 (L)  12.0 - 16.0 g/dL Final    Hematocrit 02/26/2025 35.7 (L)  36.0 - 46.0 % Final    MCV 02/26/2025 85  80 - 100 fL Final    MCH 02/26/2025 28.1  26.0 - 34.0 pg Final    MCHC 02/26/2025 33.1  32.0 - 36.0 g/dL Final    RDW 02/26/2025 13.9  11.5 - 14.5 % Final    Platelets 02/26/2025 202  150 - 450 x10*3/uL Final    Neutrophils % 02/26/2025 46.3  40.0 - 80.0 % Final    Immature Granulocytes %, Automated 02/26/2025 0.0  0.0 - 0.9 % Final    Lymphocytes % 02/26/2025 39.0  13.0 - 44.0 % Final    Monocytes % 02/26/2025 11.5  2.0 - 10.0 % Final    Eosinophils % 02/26/2025 1.4  0.0 - 6.0 % Final    Basophils % 02/26/2025 1.8  0.0 - 2.0 % Final    Neutrophils Absolute 02/26/2025 1.01 (L)  1.20 - 7.70 x10*3/uL Final    Immature Granulocytes  Absolute, Au* 02/26/2025 0.00  0.00 - 0.70 x10*3/uL Final    Lymphocytes Absolute 02/26/2025 0.85 (L)  1.20 - 4.80 x10*3/uL Final    Monocytes Absolute 02/26/2025 0.25  0.10 - 1.00 x10*3/uL Final    Eosinophils Absolute 02/26/2025 0.03  0.00 - 0.70 x10*3/uL Final    Basophils Absolute 02/26/2025 0.04  0.00 - 0.10 x10*3/uL Final   Lab on 02/19/2025   Component Date Value Ref Range Status    Color, Urine 02/19/2025 Light-Yellow  Light-Yellow, Yellow, Dark-Yellow Final    Appearance, Urine 02/19/2025 Clear  Clear Final    Specific Gravity, Urine 02/19/2025 1.005  1.005 - 1.035 Final    pH, Urine 02/19/2025 5.0  5.0, 5.5, 6.0, 6.5, 7.0, 7.5, 8.0 Final    Protein, Urine 02/19/2025 NEGATIVE  NEGATIVE, 10 (TRACE), 20 (TRACE) mg/dL Final    Glucose, Urine 02/19/2025 Normal  Normal mg/dL Final    Blood, Urine 02/19/2025 NEGATIVE  NEGATIVE mg/dL Final    Ketones, Urine 02/19/2025 NEGATIVE  NEGATIVE mg/dL Final    Bilirubin, Urine 02/19/2025 NEGATIVE  NEGATIVE mg/dL Final    Urobilinogen, Urine 02/19/2025 Normal  Normal mg/dL Final    Nitrite, Urine 02/19/2025 NEGATIVE  NEGATIVE Final    Leukocyte Esterase, Urine 02/19/2025 NEGATIVE  NEGATIVE Final   Lab on 02/18/2025   Component Date Value Ref Range Status    Glucose 02/18/2025 89  74 - 99 mg/dL Final    Sodium 02/18/2025 140  136 - 145 mmol/L Final    Potassium 02/18/2025 4.5  3.5 - 5.3 mmol/L Final    Chloride 02/18/2025 105  98 - 107 mmol/L Final    Bicarbonate 02/18/2025 23  21 - 32 mmol/L Final    Anion Gap 02/18/2025 17  10 - 20 mmol/L Final    Urea Nitrogen 02/18/2025 23  6 - 23 mg/dL Final    Creatinine 02/18/2025 2.09 (H)  0.50 - 1.05 mg/dL Final    eGFR 02/18/2025 28 (L)  >60 mL/min/1.73m*2 Final    Calcium 02/18/2025 9.6  8.6 - 10.3 mg/dL Final    Albumin 02/18/2025 4.3  3.4 - 5.0 g/dL Final    Alkaline Phosphatase 02/18/2025 62  33 - 110 U/L Final    Total Protein 02/18/2025 7.2  6.4 - 8.2 g/dL Final    AST 02/18/2025 19  9 - 39 U/L Final    Bilirubin, Total  02/18/2025 0.5  0.0 - 1.2 mg/dL Final    ALT 02/18/2025 12  7 - 45 U/L Final    Extra Tube 02/18/2025 Hold for add-ons.   Final   Infusion on 02/17/2025   Component Date Value Ref Range Status    WBC 02/17/2025 2.3 (L)  4.4 - 11.3 x10*3/uL Final    nRBC 02/17/2025 0.0  0.0 - 0.0 /100 WBCs Final    RBC 02/17/2025 4.58  4.00 - 5.20 x10*6/uL Final    Hemoglobin 02/17/2025 12.8  12.0 - 16.0 g/dL Final    Hematocrit 02/17/2025 42.7  36.0 - 46.0 % Final    MCV 02/17/2025 93  80 - 100 fL Final    MCH 02/17/2025 27.9  26.0 - 34.0 pg Final    MCHC 02/17/2025 30.0 (L)  32.0 - 36.0 g/dL Final    RDW 02/17/2025 13.4  11.5 - 14.5 % Final    Platelets 02/17/2025 112 (L)  150 - 450 x10*3/uL Final    Neutrophils % 02/17/2025 61.9  40.0 - 80.0 % Final    Immature Granulocytes %, Automated 02/17/2025 0.4  0.0 - 0.9 % Final    Lymphocytes % 02/17/2025 32.1  13.0 - 44.0 % Final    Monocytes % 02/17/2025 3.8  2.0 - 10.0 % Final    Eosinophils % 02/17/2025 0.9  0.0 - 6.0 % Final    Basophils % 02/17/2025 0.9  0.0 - 2.0 % Final    Neutrophils Absolute 02/17/2025 1.45  1.20 - 7.70 x10*3/uL Final    Immature Granulocytes Absolute, Au* 02/17/2025 0.01  0.00 - 0.70 x10*3/uL Final    Lymphocytes Absolute 02/17/2025 0.75 (L)  1.20 - 4.80 x10*3/uL Final    Monocytes Absolute 02/17/2025 0.09 (L)  0.10 - 1.00 x10*3/uL Final    Eosinophils Absolute 02/17/2025 0.02  0.00 - 0.70 x10*3/uL Final    Basophils Absolute 02/17/2025 0.02  0.00 - 0.10 x10*3/uL Final    Glucose 02/17/2025 91  74 - 99 mg/dL Final    Sodium 02/17/2025 138  136 - 145 mmol/L Final    Potassium 02/17/2025 4.8  3.5 - 5.3 mmol/L Final    Chloride 02/17/2025 105  98 - 107 mmol/L Final    Bicarbonate 02/17/2025 21  21 - 32 mmol/L Final    Anion Gap 02/17/2025 17  10 - 20 mmol/L Final    Urea Nitrogen 02/17/2025 31 (H)  6 - 23 mg/dL Final    Creatinine 02/17/2025 2.37 (H)  0.50 - 1.05 mg/dL Final    eGFR 02/17/2025 24 (L)  >60 mL/min/1.73m*2 Final    Calcium 02/17/2025 10.4 (H)  8.6 -  10.3 mg/dL Final    Albumin 02/17/2025 4.2  3.4 - 5.0 g/dL Final    Alkaline Phosphatase 02/17/2025 63  33 - 110 U/L Final    Total Protein 02/17/2025 7.5  6.4 - 8.2 g/dL Final    AST 02/17/2025 21  9 - 39 U/L Final    Bilirubin, Total 02/17/2025 0.5  0.0 - 1.2 mg/dL Final    ALT 02/17/2025 11  7 - 45 U/L Final    RBC Morphology 02/17/2025 See Below   Final    Hypochromia 02/17/2025 Mild   Final    Ovalocytes 02/17/2025 Few   Final            IMPRESSION/PLAN    Stage 4 ER+/DC+/HER2- breast cancer with evidence of bone mets on CT positive on estradiol PET.  Tolerating anastrozole and ovarian suppression with Q12 week goserelin. Given THANG while on CDK4/6 is currently holding Kisqali. Planned repeat CBC and CMP today- will continue to communicate with Nephrology Dr Bryant. Continued improvement in left chest wall lesion.  Has completed x 2 12 lead EKG.     Grade 1 -2 nausea Has essentially resolved with Kisqali hold. Will continue to monitor   Grade 1 diarrhea Loose and stable, reviewed when to utilize imodium  Grade 1-2 Fatigue is somewhat resolved with Kisqali hold    RTC in 4 weeks with Dr Ivan Marinelli with restaging scans and labs . We have reviewed given kidney functioning may be changing scan to non contrasted       At least 40 minutes of direct consultation was spent with the patient today reviewing her cancer care plan, educating and answering questions regarding ongoing follow up, greater than 50% in counseling and coordination of care            Thank you for the opportunity to be involved your care.   We discussed the clinical significance of diagnosis, goals of care and treatment plan in detail.   Please do not hesitate to reach out with any questions.   -------------------------------------------------------------------------------------------------------------------------------  Ruby Stewart MSN, APRN, FNP-C  [facility]  Division of Medical Oncology- Breast   Collaborating Physician  Dr. Ivan Marinelli   [facility] Breast Disease Team   [facility]  [id number] [place] [address]  Phone: [phone]  Fax: [phone]  Available via Secure Chat    Confidential Peer Review Document-  Privilege  Privileged Pursuant to [place] Revised Code Section 2305.24, .25, .251 & .252

## 2025-03-17 NOTE — PATIENT INSTRUCTIONS
1st Zometa dose reduced 3/3/25. We will transition to Xgeva as it will be safer for your kidneys. Please reschedule this from 5/12/25 to 5/26/25.    Please try to limit teas to 16oz per day and goal is 64 oz of water per day.      12 lead EKG  x 2 have been completed      Follow up Dr Ivan Marinelli 4/14/25 with restaging scan (4-10-25)    Labs today    4/7/25 follow up Dr Bryant Nephrology - he will give opinion regarding if CT scan can be with or without contrast     Please continue daily anastrozole. Currently Kisqail 400 mg every 21 day with 7 days IS on HOLD as this is the recommendation of the Nephrologist Dr Bryant.       Next goserelin shot (every 12 weeks) 4/14/25      Please call [phone] with any questions or concerns prior to your next office visit.

## 2025-03-24 ENCOUNTER — LAB (OUTPATIENT)
Dept: LAB | Facility: HOSPITAL | Age: 55
End: 2025-03-24
Payer: COMMERCIAL

## 2025-03-24 DIAGNOSIS — R79.89 ELEVATED SERUM CREATININE: ICD-10-CM

## 2025-03-24 DIAGNOSIS — C79.51 MALIGNANT NEOPLASM METASTATIC TO BONE (MULTI): Primary | ICD-10-CM

## 2025-03-24 DIAGNOSIS — C79.51 MALIGNANT NEOPLASM METASTATIC TO BONE (MULTI): ICD-10-CM

## 2025-03-24 LAB
ALBUMIN SERPL BCP-MCNC: 4.3 G/DL (ref 3.4–5)
ALP SERPL-CCNC: 75 U/L (ref 33–110)
ALT SERPL W P-5'-P-CCNC: 9 U/L (ref 7–45)
ANION GAP SERPL CALC-SCNC: 16 MMOL/L (ref 10–20)
AST SERPL W P-5'-P-CCNC: 15 U/L (ref 9–39)
BASOPHILS # BLD AUTO: 0.07 X10*3/UL (ref 0–0.1)
BASOPHILS NFR BLD AUTO: 1.5 %
BILIRUB SERPL-MCNC: 0.6 MG/DL (ref 0–1.2)
BUN SERPL-MCNC: 9 MG/DL (ref 6–23)
CALCIUM SERPL-MCNC: 9.3 MG/DL (ref 8.6–10.3)
CHLORIDE SERPL-SCNC: 104 MMOL/L (ref 98–107)
CO2 SERPL-SCNC: 23 MMOL/L (ref 21–32)
CREAT SERPL-MCNC: 1.66 MG/DL (ref 0.5–1.05)
EGFRCR SERPLBLD CKD-EPI 2021: 36 ML/MIN/1.73M*2
EOSINOPHIL # BLD AUTO: 0.09 X10*3/UL (ref 0–0.7)
EOSINOPHIL NFR BLD AUTO: 1.9 %
ERYTHROCYTE [DISTWIDTH] IN BLOOD BY AUTOMATED COUNT: 14.9 % (ref 11.5–14.5)
GLUCOSE SERPL-MCNC: 143 MG/DL (ref 74–99)
HCT VFR BLD AUTO: 37.2 % (ref 36–46)
HGB BLD-MCNC: 12.1 G/DL (ref 12–16)
IMM GRANULOCYTES # BLD AUTO: 0.01 X10*3/UL (ref 0–0.7)
IMM GRANULOCYTES NFR BLD AUTO: 0.2 % (ref 0–0.9)
LYMPHOCYTES # BLD AUTO: 0.87 X10*3/UL (ref 1.2–4.8)
LYMPHOCYTES NFR BLD AUTO: 18.6 %
MCH RBC QN AUTO: 28.5 PG (ref 26–34)
MCHC RBC AUTO-ENTMCNC: 32.5 G/DL (ref 32–36)
MCV RBC AUTO: 88 FL (ref 80–100)
MONOCYTES # BLD AUTO: 0.44 X10*3/UL (ref 0.1–1)
MONOCYTES NFR BLD AUTO: 9.4 %
NEUTROPHILS # BLD AUTO: 3.19 X10*3/UL (ref 1.2–7.7)
NEUTROPHILS NFR BLD AUTO: 68.4 %
NRBC BLD-RTO: 0 /100 WBCS (ref 0–0)
PLATELET # BLD AUTO: 220 X10*3/UL (ref 150–450)
POTASSIUM SERPL-SCNC: 4.6 MMOL/L (ref 3.5–5.3)
PROT SERPL-MCNC: 7.8 G/DL (ref 6.4–8.2)
RBC # BLD AUTO: 4.25 X10*6/UL (ref 4–5.2)
SODIUM SERPL-SCNC: 138 MMOL/L (ref 136–145)
WBC # BLD AUTO: 4.7 X10*3/UL (ref 4.4–11.3)

## 2025-03-24 PROCEDURE — 36415 COLL VENOUS BLD VENIPUNCTURE: CPT

## 2025-03-24 PROCEDURE — 80053 COMPREHEN METABOLIC PANEL: CPT

## 2025-03-24 PROCEDURE — 85025 COMPLETE CBC W/AUTO DIFF WBC: CPT

## 2025-03-25 ENCOUNTER — TELEPHONE (OUTPATIENT)
Dept: HEMATOLOGY/ONCOLOGY | Facility: CLINIC | Age: 55
End: 2025-03-25
Payer: COMMERCIAL

## 2025-03-25 NOTE — TELEPHONE ENCOUNTER
----- Message from Pritesh Bryant sent at 3/25/2025 10:57 AM EDT -----  Hi  I see her Scr is slowly better. If ribociclib is the best option I think we could re-try again. I will keep her off losartan, to avoid NSAID, do weekly labs. If worsening Scr again, then will definitely need to consider an alternative therapy  Manny  ----- Message -----  From: Elizabeth Us PA-C  Sent: 3/24/2025   1:04 PM EDT  To: Pritesh Bryant MD; Jeb Salter MD    Hi Gage Salter & Manny,     I am helping to cover Ruby Stewart while she is out. However, she wanted me to loop you both in regarding this patient's THANG. She has been holding ribociclib for some time now with continued improvement in her THANG. Wondering if you both could provide some assistance with how to proceed/if continued hold is needed. Thank you!     All the best,   Elizabeth Us PA-C

## 2025-03-25 NOTE — TELEPHONE ENCOUNTER
Called pt to review plan:   - per Dr. Salter hold off on restarting ribo for another week and recheck in 1 week. Labs have been placed  - Per Dr. Bryant, okay to proceed with restarting while checking weekly labs. However, if THANG returns will need alternative treatment. Of note, she is off her losartan and not taking NSAIDs.     Plan over all:   - hold off for another week and check labs. If better okay to restart ribo with weekly labs. If worsening reach out to Dr. Bryant. Will be seeing him 4/7 and Dr. Marinelli 4/14     No further questions at this time

## 2025-04-03 ENCOUNTER — LAB (OUTPATIENT)
Dept: LAB | Facility: HOSPITAL | Age: 55
End: 2025-04-03
Payer: COMMERCIAL

## 2025-04-03 DIAGNOSIS — C79.51 MALIGNANT NEOPLASM METASTATIC TO BONE (MULTI): ICD-10-CM

## 2025-04-03 LAB
ALBUMIN SERPL BCP-MCNC: 4.5 G/DL (ref 3.4–5)
ALP SERPL-CCNC: 72 U/L (ref 33–110)
ALT SERPL W P-5'-P-CCNC: 12 U/L (ref 7–45)
ANION GAP SERPL CALC-SCNC: 16 MMOL/L (ref 10–20)
AST SERPL W P-5'-P-CCNC: 21 U/L (ref 9–39)
BASOPHILS # BLD AUTO: 0.04 X10*3/UL (ref 0–0.1)
BASOPHILS NFR BLD AUTO: 0.7 %
BILIRUB SERPL-MCNC: 0.5 MG/DL (ref 0–1.2)
BUN SERPL-MCNC: 9 MG/DL (ref 6–23)
CALCIUM SERPL-MCNC: 9.6 MG/DL (ref 8.6–10.3)
CHLORIDE SERPL-SCNC: 104 MMOL/L (ref 98–107)
CO2 SERPL-SCNC: 24 MMOL/L (ref 21–32)
CREAT SERPL-MCNC: 1.38 MG/DL (ref 0.5–1.05)
EGFRCR SERPLBLD CKD-EPI 2021: 45 ML/MIN/1.73M*2
EOSINOPHIL # BLD AUTO: 0.09 X10*3/UL (ref 0–0.7)
EOSINOPHIL NFR BLD AUTO: 1.5 %
ERYTHROCYTE [DISTWIDTH] IN BLOOD BY AUTOMATED COUNT: 14.3 % (ref 11.5–14.5)
GLUCOSE SERPL-MCNC: 97 MG/DL (ref 74–99)
HCT VFR BLD AUTO: 35.9 % (ref 36–46)
HGB BLD-MCNC: 11.9 G/DL (ref 12–16)
IMM GRANULOCYTES # BLD AUTO: 0.03 X10*3/UL (ref 0–0.7)
IMM GRANULOCYTES NFR BLD AUTO: 0.5 % (ref 0–0.9)
LYMPHOCYTES # BLD AUTO: 0.94 X10*3/UL (ref 1.2–4.8)
LYMPHOCYTES NFR BLD AUTO: 16 %
MCH RBC QN AUTO: 28.7 PG (ref 26–34)
MCHC RBC AUTO-ENTMCNC: 33.1 G/DL (ref 32–36)
MCV RBC AUTO: 87 FL (ref 80–100)
MONOCYTES # BLD AUTO: 0.41 X10*3/UL (ref 0.1–1)
MONOCYTES NFR BLD AUTO: 7 %
NEUTROPHILS # BLD AUTO: 4.38 X10*3/UL (ref 1.2–7.7)
NEUTROPHILS NFR BLD AUTO: 74.3 %
NRBC BLD-RTO: 0 /100 WBCS (ref 0–0)
PHOSPHATE SERPL-MCNC: 3.6 MG/DL (ref 2.5–4.9)
PLATELET # BLD AUTO: 314 X10*3/UL (ref 150–450)
POTASSIUM SERPL-SCNC: 4 MMOL/L (ref 3.5–5.3)
PROT SERPL-MCNC: 8 G/DL (ref 6.4–8.2)
RBC # BLD AUTO: 4.15 X10*6/UL (ref 4–5.2)
SODIUM SERPL-SCNC: 140 MMOL/L (ref 136–145)
WBC # BLD AUTO: 5.9 X10*3/UL (ref 4.4–11.3)

## 2025-04-03 PROCEDURE — 84100 ASSAY OF PHOSPHORUS: CPT | Performed by: INTERNAL MEDICINE

## 2025-04-03 PROCEDURE — 84075 ASSAY ALKALINE PHOSPHATASE: CPT

## 2025-04-03 PROCEDURE — 36415 COLL VENOUS BLD VENIPUNCTURE: CPT | Performed by: INTERNAL MEDICINE

## 2025-04-03 PROCEDURE — 85025 COMPLETE CBC W/AUTO DIFF WBC: CPT

## 2025-04-03 PROCEDURE — 82565 ASSAY OF CREATININE: CPT | Performed by: INTERNAL MEDICINE

## 2025-04-03 PROCEDURE — 82610 CYSTATIN C: CPT | Performed by: INTERNAL MEDICINE

## 2025-04-06 LAB
CREAT SERPL-MCNC: 1.41 MG/DL (ref 0.59–1.01)
CYSTATIN C SERPL-MCNC: 1.59 MG/L (ref 0.61–0.95)
EGFRCR-CYS SERPLBLD CKD-EPI 2021: 42 ML/MIN/{1.73_M2}

## 2025-04-07 ENCOUNTER — NURSE TRIAGE (OUTPATIENT)
Dept: HEMATOLOGY/ONCOLOGY | Facility: HOSPITAL | Age: 55
End: 2025-04-07

## 2025-04-07 ENCOUNTER — APPOINTMENT (OUTPATIENT)
Dept: NEPHROLOGY | Facility: CLINIC | Age: 55
End: 2025-04-07
Payer: COMMERCIAL

## 2025-04-07 VITALS
DIASTOLIC BLOOD PRESSURE: 92 MMHG | WEIGHT: 172 LBS | BODY MASS INDEX: 30.47 KG/M2 | HEART RATE: 64 BPM | SYSTOLIC BLOOD PRESSURE: 134 MMHG

## 2025-04-07 DIAGNOSIS — N17.9 AKI (ACUTE KIDNEY INJURY): Primary | ICD-10-CM

## 2025-04-07 DIAGNOSIS — M10.30 ACUTE GOUT DUE TO RENAL IMPAIRMENT, UNSPECIFIED SITE: ICD-10-CM

## 2025-04-07 DIAGNOSIS — C79.51 MALIGNANT NEOPLASM METASTATIC TO BONE (MULTI): ICD-10-CM

## 2025-04-07 PROCEDURE — 3080F DIAST BP >= 90 MM HG: CPT | Performed by: INTERNAL MEDICINE

## 2025-04-07 PROCEDURE — 3075F SYST BP GE 130 - 139MM HG: CPT | Performed by: INTERNAL MEDICINE

## 2025-04-07 PROCEDURE — 3062F POS MACROALBUMINURIA REV: CPT | Performed by: INTERNAL MEDICINE

## 2025-04-07 PROCEDURE — 99214 OFFICE O/P EST MOD 30 MIN: CPT | Performed by: INTERNAL MEDICINE

## 2025-04-07 PROCEDURE — 1036F TOBACCO NON-USER: CPT | Performed by: INTERNAL MEDICINE

## 2025-04-07 RX ORDER — COLCHICINE 0.6 MG/1
0.6 CAPSULE ORAL DAILY
Qty: 10 CAPSULE | Refills: 0 | Status: SHIPPED | OUTPATIENT
Start: 2025-04-07 | End: 2025-04-17

## 2025-04-07 NOTE — LETTER
April 7, 2025     No Recipients    Patient: Iva Pollock   YOB: 1970   Date of Visit: 4/7/2025       Dear Dr. Herzog Recipients:    Thank you for referring Iva Pollock to me for evaluation. Below are my notes for this consultation.  If you have questions, please do not hesitate to call me. I look forward to following your patient along with you.       Sincerely,     Pritesh Bryant MD      CC: No Recipients  ______________________________________________________________________________________    Nephrology Outpatient Follow-up Patient Note    Chief Concern:  Follow-up for THANG    History Of Present Illness   Iva Pollock is a 54 y.o. female with a history of  HTN, T2D, obesity, recently diagnosed Malignant neoplasm of left breast, estrogen receptor positive, Clinical: Stage IV . Breast Ca tx: Anastrozole since 1/13/25, goserelin 1/13/25,  and ribociclib (kisqali) since 1/27/25  - Started taking kisqali on January 2025, poor appetite, followed by rising Scr. Bscr 0.9, got better stopping ribo and resume again with new rising Scr again. Currently off and Scr down to 1.38. Decrease eGFR confirmed by Cystatin C  I stopped her losartan  - on metformin  Normal CBC , no eosinophilia  Normal UA from Feb 2025 but repeat one month later with +wbc, no RBC, UPCR 1.2, UACR 470, +glucose w/serum glucose 106, normal serum P  Normal Kidney US from Feb  - joint inflammation R wrist and R elbow for a few days, no trauma, hx of gout with few attacks, last >1 year ago. No rash        Past Medical History  She has a past medical history of Diabetes mellitus, type 2 (Multi), Gout, unspecified (07/26/2014), Other conditions influencing health status (12/26/2013), Other general symptoms and signs (06/04/2019), Pain in left shoulder (09/19/2016), Pain in right foot (06/12/2016), Pain in right hip (09/19/2016), Pain in unspecified foot (07/26/2014), Pneumonia, unspecified organism (03/13/2016), and Thrombocytopenia  (CMS-HCC).  Patient Active Problem List   Diagnosis   • Fever   • Head ache   • Obesity (BMI 30-39.9)   • Polyphagia   • Suspected dengue fever   • Low platelet count (CMS-HCC)   • Type 2 diabetes mellitus with hyperglycemia, without long-term current use of insulin   • Malignant neoplasm of central portion of left breast in female, estrogen receptor positive   • Malignant neoplasm metastatic to bone (Multi)   • Encounter for antineoplastic chemotherapy   • Encounter for monitoring aromatase inhibitor therapy   • Suppression of ovarian secretion   • Encounter for monitoring bisphosphonate therapy   • Chemotherapy induced nausea and vomiting   • Chemotherapy-induced fatigue   • Chemotherapy induced diarrhea       Surgical History  She has a past surgical history that includes Appendectomy (09/19/2016) and Cholecystectomy.     Social History  She reports that she has never smoked. She has never used smokeless tobacco. She reports current alcohol use of about 1.0 standard drink of alcohol per week. She reports that she does not use drugs.    Family History  Family History   Problem Relation Name Age of Onset   • Diabetes Mother     • Hypertension Father     • Throat cancer Mother's Brother     • Diabetes Maternal Grandmother     • Diabetes Maternal Grandfather     • Diabetes Paternal Grandmother     • Diabetes Paternal Grandfather          Allergies  Sulfa (sulfonamide antibiotics) and Sulfamethoxazole-trimethoprim    Review of Systems  A 12-point review of systems was performed and noted be negative except for that which was mentioned in the history of present illness     Last Recorded Vitals  BP (!) 134/92 (BP Location: Left arm, Patient Position: Sitting)   Pulse 64   Wt 78 kg (172 lb)   BMI 30.47 kg/m²       Physical Exam:  Constitutional:  No acute distress  Respiration:  Breathing comfortably, no stridor  Cardiovascular:  No clubbing/cyanosis/edema in hands  Eyes:  EOM intact, sclera normal  Neuro:  Alert and  oriented times 3, Cranial nerves II-XII grossly intact and symmetric bilaterally. No asterixis  Head and Face:  Symmetric facial features, no masses or lesions, sinuses non-tender to palpation  Neck/Lymph:  No LAD, no thyroid masses, trachea midline, No JVD  Skin:  no visible rash or scratching marks   Psych:  Alert and oriented with appropriate mood and affect  R wrist and R elbow joint inflammation, tender      Medications:  Medication Documentation Review Audit       Reviewed by OKSANA Valentine (Nurse Practitioner) on 03/17/25 at 0822      Medication Order Taking? Sig Documenting Provider Last Dose Status   amLODIPine (Norvasc) 5 mg tablet [id number]  Take 1 tablet (5 mg) by mouth once daily. Pritesh Bryant MD  Active   anastrozole (Arimidex) 1 mg tablet [id number]  Take 1 tablet (1 mg total) by mouth once daily.  Swallow whole with a drink of water. OKSANA Valentine  Active   blood sugar diagnostic (Accu-Chek Guide test strips) strip [id number] No 1 strip 2 times a day. Rachael Avery MD Taking Active   blood-glucose meter (Accu-Chek Guide Glucose Meter) misc [id number] No Use as directed to test blood sugar daily Rachael Avery MD Taking Active   lancets (Accu-Chek Fastclix Lancet Drum) misc [id number] No Use 1 lancet to check blood sugar twice a day as directed. Rachael Avery MD Taking Active     Discontinued 03/10/25 1309   metFORMIN XR (Glucophage-XR) 500 mg 24 hr tablet [id number]  Take 2 tablets (1,000 mg) by mouth 2 times daily (morning and late afternoon). Do not crush, chew, or split. Nancy Belcher MD  Active   metoprolol succinate XL (Toprol-XL) 50 mg 24 hr tablet [id number]  Take 1 tablet (50 mg) by mouth once daily. Do not crush or chew. Nancy Belcher MD  Active   metroNIDAZOLE (Flagyl) 500 mg tablet [id number]  500mg tablet, crush tablet and sprinkle over open wound twice daily   Patient not taking: Reported on 3/10/2025    OKSANA Elliott  Active  "  ribociclib (Kisqali) 2 x 200 mg tablet therapy pack [id number]  Take 2 tablets (400 mg total) by mouth in the morning for 21 days, followed by 7 days off per 28-day treatment cycle. Ivan Marinelli MD  Active   rosuvastatin (Crestor) 10 mg tablet [id number]  Take 1 tablet (10 mg) by mouth once daily. Nancy Belcher MD  Active                    Relevant Results Recent labs reviewed in the EMR.  Lab Results   Component Value Date    HGB 11.9 (L) 04/03/2025    HGB 12.1 03/24/2025    HGB 12.5 03/17/2025    MCV 87 04/03/2025    MCV 88 03/24/2025    MCV 87 03/17/2025     04/03/2025     03/24/2025     03/17/2025       No results found for: \"FERRITIN\", \"IRON\"    Lab Results   Component Value Date     04/03/2025    K 4.0 04/03/2025     04/03/2025    BUN 9 04/03/2025    CREATININE 1.38 (H) 04/03/2025       Imaging:  I personally reviewed then and this is my impression:        Assessment/Plan  1. THANG (acute kidney injury) (Primary)  - THANG seems clearly related to ribociclib use, improving Scr while on hold. Unclear mechanism, but noticing on last UA new +wbc with new mild/moderate proteinuria. Due to lingering tubular damage from ribo --most likely cause--, AIN?, proteinuric disease 2ry to Ca or chemo agent? That seems less likely since that would mostly cause  MGN expecting a much higher degree of proteinuria. Also might need to make sure no associated monoclonal gammopathy??  - Will get repeat urine studies, also with SPEP/UPEP/K-L ratio/uric acid.  If stable or improving Scr and nothing else worrisome, might re-challenge ribo with weekly labs, and if new THANG will need to consider an alternative agent   - Albumin-Creatinine Ratio, Urine Random; Future  - Protein, Urine Random; Future  - Serum Protein Electrophoresis + Immunofixation; Future  - Urine Protein Electrophoresis + Immunofixation; Future  - [place]/Lambda Free Light Chain, Serum; Future  - Uric acid; Future  - Urinalysis " with Reflex Culture and Microscopic; Future  - Follow Up In Nephrology  - colchicine (Mitigare) 0.6 mg capsule capsule; Take 1 capsule (0.6 mg) by mouth once daily for 10 days.  Dispense: 10 capsule; Refill: 0  - C3 complement; Future  - C4 complement; Future  - Albumin-Creatinine Ratio, Urine Random  - Protein, Urine Random  - Serum Protein Electrophoresis + Immunofixation  - [place]/Lambda Free Light Chain, Serum  - Uric acid  - Urinalysis with Reflex Culture and Microscopic  - C3 complement  - C4 complement  - Follow Up In Nephrology; Future    2. Malignant neoplasm metastatic to bone (Multi)    3. Acute gout due to renal impairment, unspecified site  - inflammatory arthropathy, due to gout?, due to chemo?. Will get uric acid, avoid NSAID use, giving colchicine, if not better in 2-3 days will need to seek additional assistance   - Uric acid; Future  - colchicine (Mitigare) 0.6 mg capsule capsule; Take 1 capsule (0.6 mg) by mouth once daily for 10 days.  Dispense: 10 capsule; Refill: 0  - Uric acid     - RTO 1-2 months   Pritesh Bryant MD  Nephrology and Hypertension

## 2025-04-07 NOTE — TELEPHONE ENCOUNTER
Additional Information  • Are there daily activities that you're having trouble with such as getting dressed or making meals?     Pt is right handed-having difficulty with many daily tasks such as cutting food, moving in bed  • Commented on: Where is the pain? Is there more than one place where you're having pain?     Right elbow and lateral aspect of right hand between wrist and pinky finger  • Commented on: Are you having any of these problems?     Swollen at elbow and along lateral aspect of right hand  No other redness/swelling at other areas of the RUE  • Commented on: What else do you want to tell me about this problem?     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

## 2025-04-07 NOTE — PATIENT COMMUNICATION
Call placed to patient to follow up on [employer] message regarding pain/swelling in her right elbow.  She reports an old injury to the right elbow and on 4/4 she developed increased pain and swelling/warmth to the area.  Additionally, she has new area of swelling and discomfort on the lateral aspect of her right hand between the pinky and wrist.  She participated in a strength training class on 4/3 and thinks one of the exercises may have exacerbated her elbow.  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      Pain in the elbow is described as throbbing and is exacerbated by movement, particularly straightening the joint.  She has decreased  strength in the right hand and needs help with things like cutting.  She gets some relief from Extra Strength Tylenol.  She has an appt with nephrology this afternoon and plans to ask whether she can take anti-inflammatory medication.

## 2025-04-07 NOTE — PROGRESS NOTES
Nephrology Outpatient Follow-up Patient Note    Chief Concern:  Follow-up for THANG    History Of Present Illness   Ivadelfina Pollock is a 54 y.o. female with a history of  HTN, T2D, obesity, recently diagnosed Malignant neoplasm of left breast, estrogen receptor positive, Clinical: Stage IV . Breast Ca tx: Anastrozole since 1/13/25, goserelin 1/13/25,  and ribociclib (kisqali) since 1/27/25  - Started taking kisqali on January 2025, poor appetite, followed by rising Scr. Bscr 0.9, got better stopping ribo and resume again with new rising Scr again. Currently off and Scr down to 1.38. Decrease eGFR confirmed by Cystatin C  I stopped her losartan  - on metformin  Normal CBC , no eosinophilia  Normal UA from Feb 2025 but repeat one month later with +wbc, no RBC, UPCR 1.2, UACR 470, +glucose w/serum glucose 106, normal serum P  Normal Kidney US from Feb  - joint inflammation R wrist and R elbow for a few days, no trauma, hx of gout with few attacks, last >1 year ago. No rash        Past Medical History  She has a past medical history of Diabetes mellitus, type 2 (Multi), Gout, unspecified (07/26/2014), Other conditions influencing health status (12/26/2013), Other general symptoms and signs (06/04/2019), Pain in left shoulder (09/19/2016), Pain in right foot (06/12/2016), Pain in right hip (09/19/2016), Pain in unspecified foot (07/26/2014), Pneumonia, unspecified organism (03/13/2016), and Thrombocytopenia (CMS-HCC).  Patient Active Problem List   Diagnosis    Fever    Head ache    Obesity (BMI 30-39.9)    Polyphagia    Suspected dengue fever    Low platelet count (CMS-HCC)    Type 2 diabetes mellitus with hyperglycemia, without long-term current use of insulin    Malignant neoplasm of central portion of left breast in female, estrogen receptor positive    Malignant neoplasm metastatic to bone (Multi)    Encounter for antineoplastic chemotherapy    Encounter for monitoring aromatase inhibitor therapy    Suppression of  ovarian secretion    Encounter for monitoring bisphosphonate therapy    Chemotherapy induced nausea and vomiting    Chemotherapy-induced fatigue    Chemotherapy induced diarrhea       Surgical History  She has a past surgical history that includes Appendectomy (09/19/2016) and Cholecystectomy.     Social History  She reports that she has never smoked. She has never used smokeless tobacco. She reports current alcohol use of about 1.0 standard drink of alcohol per week. She reports that she does not use drugs.    Family History  Family History   Problem Relation Name Age of Onset    Diabetes Mother      Hypertension Father      Throat cancer Mother's Brother      Diabetes Maternal Grandmother      Diabetes Maternal Grandfather      Diabetes Paternal Grandmother      Diabetes Paternal Grandfather          Allergies  Sulfa (sulfonamide antibiotics) and Sulfamethoxazole-trimethoprim    Review of Systems  A 12-point review of systems was performed and noted be negative except for that which was mentioned in the history of present illness     Last Recorded Vitals  BP (!) 134/92 (BP Location: Left arm, Patient Position: Sitting)   Pulse 64   Wt 78 kg (172 lb)   BMI 30.47 kg/m²       Physical Exam:  Constitutional:  No acute distress  Respiration:  Breathing comfortably, no stridor  Cardiovascular:  No clubbing/cyanosis/edema in hands  Eyes:  EOM intact, sclera normal  Neuro:  Alert and oriented times 3, Cranial nerves II-XII grossly intact and symmetric bilaterally. No asterixis  Head and Face:  Symmetric facial features, no masses or lesions, sinuses non-tender to palpation  Neck/Lymph:  No LAD, no thyroid masses, trachea midline, No JVD  Skin:  no visible rash or scratching marks   Psych:  Alert and oriented with appropriate mood and affect  R wrist and R elbow joint inflammation, tender      Medications:  Medication Documentation Review Audit       Reviewed by TAMIKO Valentine-CNP (Nurse Practitioner) on  03/17/25 at 0822      Medication Order Taking? Sig Documenting Provider Last Dose Status   amLODIPine (Norvasc) 5 mg tablet [id number]  Take 1 tablet (5 mg) by mouth once daily. Pritesh Bryant MD  Active   anastrozole (Arimidex) 1 mg tablet [id number]  Take 1 tablet (1 mg total) by mouth once daily.  Swallow whole with a drink of water. Ruby Stewart, APRN-CNP  Active   blood sugar diagnostic (Accu-Chek Guide test strips) strip [id number] No 1 strip 2 times a day. Rachael Avery MD Taking Active   blood-glucose meter (Accu-Chek Guide Glucose Meter) misc [id number] No Use as directed to test blood sugar daily Rachael Avery MD Taking Active   lancets (Accu-Chek Fastclix Lancet Drum) misc [id number] No Use 1 lancet to check blood sugar twice a day as directed. Rachael Avery MD Taking Active     Discontinued 03/10/25 1309   metFORMIN XR (Glucophage-XR) 500 mg 24 hr tablet [id number]  Take 2 tablets (1,000 mg) by mouth 2 times daily (morning and late afternoon). Do not crush, chew, or split. Nancy Belcher MD  Active   metoprolol succinate XL (Toprol-XL) 50 mg 24 hr tablet [id number]  Take 1 tablet (50 mg) by mouth once daily. Do not crush or chew. Nancy Belcher MD  Active   metroNIDAZOLE (Flagyl) 500 mg tablet [id number]  500mg tablet, crush tablet and sprinkle over open wound twice daily   Patient not taking: Reported on 3/10/2025    Isabela Solomon, APRN-CNP  Active   ribociclib (Kisqali) 2 x 200 mg tablet therapy pack [id number]  Take 2 tablets (400 mg total) by mouth in the morning for 21 days, followed by 7 days off per 28-day treatment cycle. Ivan Marinelli MD  Active   rosuvastatin (Crestor) 10 mg tablet [id number]  Take 1 tablet (10 mg) by mouth once daily. Nancy Belcher MD  Active                    Relevant Results Recent labs reviewed in the EMR.  Lab Results   Component Value Date    HGB 11.9 (L) 04/03/2025    HGB 12.1 03/24/2025    HGB 12.5 03/17/2025    MCV 87 04/03/2025     "MCV 88 03/24/2025    MCV 87 03/17/2025     04/03/2025     03/24/2025     03/17/2025       No results found for: \"FERRITIN\", \"IRON\"    Lab Results   Component Value Date     04/03/2025    K 4.0 04/03/2025     04/03/2025    BUN 9 04/03/2025    CREATININE 1.38 (H) 04/03/2025       Imaging:  I personally reviewed then and this is my impression:        Assessment/Plan   1. THANG (acute kidney injury) (Primary)  - THANG seems clearly related to ribociclib use, improving Scr while on hold. Unclear mechanism, but noticing on last UA new +wbc with new mild/moderate proteinuria. Due to lingering tubular damage from ribo --most likely cause--, AIN?, proteinuric disease 2ry to Ca or chemo agent? That seems less likely since that would mostly cause  MGN expecting a much higher degree of proteinuria. Also might need to make sure no associated monoclonal gammopathy??  - Will get repeat urine studies, also with SPEP/UPEP/K-L ratio/uric acid.  If stable or improving Scr and nothing else worrisome, might re-challenge ribo with weekly labs, and if new THANG will need to consider an alternative agent   - Albumin-Creatinine Ratio, Urine Random; Future  - Protein, Urine Random; Future  - Serum Protein Electrophoresis + Immunofixation; Future  - Urine Protein Electrophoresis + Immunofixation; Future  - [place]/Lambda Free Light Chain, Serum; Future  - Uric acid; Future  - Urinalysis with Reflex Culture and Microscopic; Future  - Follow Up In Nephrology  - colchicine (Mitigare) 0.6 mg capsule capsule; Take 1 capsule (0.6 mg) by mouth once daily for 10 days.  Dispense: 10 capsule; Refill: 0  - C3 complement; Future  - C4 complement; Future  - Albumin-Creatinine Ratio, Urine Random  - Protein, Urine Random  - Serum Protein Electrophoresis + Immunofixation  - [place]/Lambda Free Light Chain, Serum  - Uric acid  - Urinalysis with Reflex Culture and Microscopic  - C3 complement  - C4 complement  - Follow Up In Nephrology; " Future    2. Malignant neoplasm metastatic to bone (Multi)    3. Acute gout due to renal impairment, unspecified site  - inflammatory arthropathy, due to gout?, due to chemo?. Will get uric acid, avoid NSAID use, giving colchicine, if not better in 2-3 days will need to seek additional assistance   - Uric acid; Future  - colchicine (Mitigare) 0.6 mg capsule capsule; Take 1 capsule (0.6 mg) by mouth once daily for 10 days.  Dispense: 10 capsule; Refill: 0  - Uric acid     - RTO 1-2 months   Pritesh Bryant MD  Nephrology and Hypertension

## 2025-04-08 ENCOUNTER — APPOINTMENT (OUTPATIENT)
Dept: LAB | Facility: HOSPITAL | Age: 55
End: 2025-04-08
Payer: COMMERCIAL

## 2025-04-08 DIAGNOSIS — R69 ILLNESS, UNSPECIFIED: Primary | ICD-10-CM

## 2025-04-08 LAB
C3 SERPL-MCNC: 181 MG/DL (ref 87–200)
C4 SERPL-MCNC: 46 MG/DL (ref 10–50)
HOLD SPECIMEN: NORMAL
KAPPA LC SERPL-MCNC: 2.82 MG/DL (ref 0.33–1.94)
KAPPA LC/LAMBDA SER: 1 {RATIO} (ref 0.26–1.65)
LAMBDA LC SERPL-MCNC: 2.82 MG/DL (ref 0.57–2.63)
PROT SERPL-MCNC: 7.5 G/DL (ref 6.4–8.2)
URATE SERPL-MCNC: 5.2 MG/DL (ref 2.3–6.7)

## 2025-04-08 PROCEDURE — 86320 SERUM IMMUNOELECTROPHORESIS: CPT | Performed by: INTERNAL MEDICINE

## 2025-04-08 PROCEDURE — 86160 COMPLEMENT ANTIGEN: CPT | Performed by: INTERNAL MEDICINE

## 2025-04-08 PROCEDURE — 86334 IMMUNOFIX E-PHORESIS SERUM: CPT | Performed by: INTERNAL MEDICINE

## 2025-04-08 PROCEDURE — 83521 IG LIGHT CHAINS FREE EACH: CPT | Performed by: INTERNAL MEDICINE

## 2025-04-08 PROCEDURE — 84155 ASSAY OF PROTEIN SERUM: CPT | Performed by: INTERNAL MEDICINE

## 2025-04-08 PROCEDURE — 84550 ASSAY OF BLOOD/URIC ACID: CPT | Performed by: INTERNAL MEDICINE

## 2025-04-08 PROCEDURE — 84165 PROTEIN E-PHORESIS SERUM: CPT | Performed by: INTERNAL MEDICINE

## 2025-04-08 PROCEDURE — 36415 COLL VENOUS BLD VENIPUNCTURE: CPT | Performed by: INTERNAL MEDICINE

## 2025-04-10 ENCOUNTER — HOSPITAL ENCOUNTER (OUTPATIENT)
Dept: RADIOLOGY | Facility: HOSPITAL | Age: 55
Discharge: HOME | End: 2025-04-10
Payer: COMMERCIAL

## 2025-04-10 ENCOUNTER — LAB (OUTPATIENT)
Dept: LAB | Facility: HOSPITAL | Age: 55
End: 2025-04-10
Payer: COMMERCIAL

## 2025-04-10 DIAGNOSIS — Z51.11 ENCOUNTER FOR ANTINEOPLASTIC CHEMOTHERAPY: ICD-10-CM

## 2025-04-10 DIAGNOSIS — Z51.81 ENCOUNTER FOR MONITORING AROMATASE INHIBITOR THERAPY: ICD-10-CM

## 2025-04-10 DIAGNOSIS — Z79.811 ENCOUNTER FOR MONITORING AROMATASE INHIBITOR THERAPY: ICD-10-CM

## 2025-04-10 DIAGNOSIS — C50.112 MALIGNANT NEOPLASM OF CENTRAL PORTION OF LEFT BREAST IN FEMALE, ESTROGEN RECEPTOR POSITIVE: ICD-10-CM

## 2025-04-10 DIAGNOSIS — C79.51 MALIGNANT NEOPLASM METASTATIC TO BONE (MULTI): Primary | ICD-10-CM

## 2025-04-10 DIAGNOSIS — E28.39 SUPPRESSION OF OVARIAN SECRETION: ICD-10-CM

## 2025-04-10 DIAGNOSIS — Z79.83 ENCOUNTER FOR MONITORING BISPHOSPHONATE THERAPY: ICD-10-CM

## 2025-04-10 DIAGNOSIS — Z17.0 MALIGNANT NEOPLASM OF CENTRAL PORTION OF LEFT BREAST IN FEMALE, ESTROGEN RECEPTOR POSITIVE: ICD-10-CM

## 2025-04-10 DIAGNOSIS — Z51.81 ENCOUNTER FOR MONITORING BISPHOSPHONATE THERAPY: ICD-10-CM

## 2025-04-10 DIAGNOSIS — Z78.9 POOR INTRAVENOUS ACCESS: ICD-10-CM

## 2025-04-10 DIAGNOSIS — N17.9 AKI (ACUTE KIDNEY INJURY): ICD-10-CM

## 2025-04-10 LAB
ALBUMIN SERPL BCP-MCNC: 4 G/DL (ref 3.4–5)
ALBUMIN: 3.7 G/DL (ref 3.4–5)
ALP SERPL-CCNC: 70 U/L (ref 33–110)
ALPHA 1 GLOBULIN: 0.5 G/DL (ref 0.2–0.6)
ALPHA 2 GLOBULIN: 1.2 G/DL (ref 0.4–1.1)
ALT SERPL W P-5'-P-CCNC: 8 U/L (ref 7–45)
ANION GAP SERPL CALC-SCNC: 16 MMOL/L (ref 10–20)
AST SERPL W P-5'-P-CCNC: 14 U/L (ref 9–39)
BASOPHILS # BLD AUTO: 0.04 X10*3/UL (ref 0–0.1)
BASOPHILS NFR BLD AUTO: 0.6 %
BETA GLOBULIN: 0.9 G/DL (ref 0.5–1.2)
BILIRUB SERPL-MCNC: 0.5 MG/DL (ref 0–1.2)
BUN SERPL-MCNC: 10 MG/DL (ref 6–23)
CALCIUM SERPL-MCNC: 9.2 MG/DL (ref 8.6–10.6)
CANCER AG27-29 SERPL-ACNC: 50.4 U/ML (ref 0–38.6)
CHLORIDE SERPL-SCNC: 103 MMOL/L (ref 98–107)
CO2 SERPL-SCNC: 23 MMOL/L (ref 21–32)
CREAT SERPL-MCNC: 1.18 MG/DL (ref 0.5–1.05)
CREAT UR-MCNC: 101.6 MG/DL (ref 20–320)
EGFRCR SERPLBLD CKD-EPI 2021: 55 ML/MIN/1.73M*2
EOSINOPHIL # BLD AUTO: 0.13 X10*3/UL (ref 0–0.7)
EOSINOPHIL NFR BLD AUTO: 2 %
ERYTHROCYTE [DISTWIDTH] IN BLOOD BY AUTOMATED COUNT: 14.4 % (ref 11.5–14.5)
GAMMA GLOBULIN: 1.1 G/DL (ref 0.5–1.4)
GLUCOSE SERPL-MCNC: 101 MG/DL (ref 74–99)
HCT VFR BLD AUTO: 36.1 % (ref 36–46)
HGB BLD-MCNC: 11.6 G/DL (ref 12–16)
IMM GRANULOCYTES # BLD AUTO: 0.02 X10*3/UL (ref 0–0.7)
IMM GRANULOCYTES NFR BLD AUTO: 0.3 % (ref 0–0.9)
IMMUNOFIXATION COMMENT: ABNORMAL
LYMPHOCYTES # BLD AUTO: 0.97 X10*3/UL (ref 1.2–4.8)
LYMPHOCYTES NFR BLD AUTO: 14.9 %
MCH RBC QN AUTO: 28.2 PG (ref 26–34)
MCHC RBC AUTO-ENTMCNC: 32.1 G/DL (ref 32–36)
MCV RBC AUTO: 88 FL (ref 80–100)
MICROALBUMIN UR-MCNC: 18.9 MG/L
MICROALBUMIN/CREAT UR: 18.6 UG/MG CREAT
MONOCYTES # BLD AUTO: 0.42 X10*3/UL (ref 0.1–1)
MONOCYTES NFR BLD AUTO: 6.4 %
NEUTROPHILS # BLD AUTO: 4.94 X10*3/UL (ref 1.2–7.7)
NEUTROPHILS NFR BLD AUTO: 75.8 %
NRBC BLD-RTO: 0 /100 WBCS (ref 0–0)
PATH REVIEW - SERUM IMMUNOFIXATION: ABNORMAL
PATH REVIEW-SERUM PROTEIN ELECTROPHORESIS: ABNORMAL
PLATELET # BLD AUTO: 315 X10*3/UL (ref 150–450)
POTASSIUM SERPL-SCNC: 4 MMOL/L (ref 3.5–5.3)
PROT SERPL-MCNC: 8.2 G/DL (ref 6.4–8.2)
PROT UR-ACNC: 17 MG/DL (ref 5–25)
PROTEIN ELECTROPHORESIS COMMENT: ABNORMAL
RBC # BLD AUTO: 4.11 X10*6/UL (ref 4–5.2)
SODIUM SERPL-SCNC: 138 MMOL/L (ref 136–145)
WBC # BLD AUTO: 6.5 X10*3/UL (ref 4.4–11.3)

## 2025-04-10 PROCEDURE — 84156 ASSAY OF PROTEIN URINE: CPT

## 2025-04-10 PROCEDURE — 2550000001 HC RX 255 CONTRASTS: Performed by: NURSE PRACTITIONER

## 2025-04-10 PROCEDURE — 86335 IMMUNFIX E-PHORSIS/URINE/CSF: CPT

## 2025-04-10 PROCEDURE — 85025 COMPLETE CBC W/AUTO DIFF WBC: CPT

## 2025-04-10 PROCEDURE — 86300 IMMUNOASSAY TUMOR CA 15-3: CPT

## 2025-04-10 PROCEDURE — 71260 CT THORAX DX C+: CPT

## 2025-04-10 PROCEDURE — 36415 COLL VENOUS BLD VENIPUNCTURE: CPT

## 2025-04-10 PROCEDURE — 82043 UR ALBUMIN QUANTITATIVE: CPT | Performed by: INTERNAL MEDICINE

## 2025-04-10 PROCEDURE — 80053 COMPREHEN METABOLIC PANEL: CPT

## 2025-04-10 RX ADMIN — IOHEXOL 75 ML: 350 INJECTION, SOLUTION INTRAVENOUS at 12:04

## 2025-04-11 ENCOUNTER — TELEPHONE (OUTPATIENT)
Dept: HEMATOLOGY/ONCOLOGY | Facility: HOSPITAL | Age: 55
End: 2025-04-11
Payer: COMMERCIAL

## 2025-04-11 NOTE — TELEPHONE ENCOUNTER
Call to patient to review CT as discussed with Dr Ivan Marinelli is showing improvement. Questions answered and apt for 4/14/25 reviewed

## 2025-04-14 ENCOUNTER — OFFICE VISIT (OUTPATIENT)
Dept: HEMATOLOGY/ONCOLOGY | Facility: HOSPITAL | Age: 55
End: 2025-04-14
Payer: COMMERCIAL

## 2025-04-14 ENCOUNTER — LAB (OUTPATIENT)
Dept: LAB | Facility: HOSPITAL | Age: 55
End: 2025-04-14
Payer: COMMERCIAL

## 2025-04-14 ENCOUNTER — SOCIAL WORK (OUTPATIENT)
Dept: CASE MANAGEMENT | Facility: HOSPITAL | Age: 55
End: 2025-04-14
Payer: COMMERCIAL

## 2025-04-14 ENCOUNTER — INFUSION (OUTPATIENT)
Dept: HEMATOLOGY/ONCOLOGY | Facility: HOSPITAL | Age: 55
End: 2025-04-14
Payer: COMMERCIAL

## 2025-04-14 ENCOUNTER — APPOINTMENT (OUTPATIENT)
Dept: HEMATOLOGY/ONCOLOGY | Facility: HOSPITAL | Age: 55
End: 2025-04-14
Payer: COMMERCIAL

## 2025-04-14 VITALS
HEART RATE: 63 BPM | BODY MASS INDEX: 29.69 KG/M2 | OXYGEN SATURATION: 98 % | TEMPERATURE: 98.2 F | HEIGHT: 63 IN | DIASTOLIC BLOOD PRESSURE: 83 MMHG | WEIGHT: 167.55 LBS | SYSTOLIC BLOOD PRESSURE: 144 MMHG | RESPIRATION RATE: 18 BRPM

## 2025-04-14 DIAGNOSIS — Z51.81 ENCOUNTER FOR MONITORING AROMATASE INHIBITOR THERAPY: ICD-10-CM

## 2025-04-14 DIAGNOSIS — C50.112 MALIGNANT NEOPLASM OF CENTRAL PORTION OF LEFT BREAST IN FEMALE, ESTROGEN RECEPTOR POSITIVE: ICD-10-CM

## 2025-04-14 DIAGNOSIS — Z17.0 MALIGNANT NEOPLASM OF CENTRAL PORTION OF LEFT BREAST IN FEMALE, ESTROGEN RECEPTOR POSITIVE: ICD-10-CM

## 2025-04-14 DIAGNOSIS — Z51.81 ENCOUNTER FOR MONITORING BISPHOSPHONATE THERAPY: ICD-10-CM

## 2025-04-14 DIAGNOSIS — Z79.811 ENCOUNTER FOR MONITORING AROMATASE INHIBITOR THERAPY: ICD-10-CM

## 2025-04-14 DIAGNOSIS — E28.39 SUPPRESSION OF OVARIAN SECRETION: ICD-10-CM

## 2025-04-14 DIAGNOSIS — Z51.11 ENCOUNTER FOR ANTINEOPLASTIC CHEMOTHERAPY: ICD-10-CM

## 2025-04-14 DIAGNOSIS — Z79.83 ENCOUNTER FOR MONITORING BISPHOSPHONATE THERAPY: ICD-10-CM

## 2025-04-14 LAB
ALBUMIN MFR UR ELPH: 40.5 %
ALPHA1 GLOB MFR UR ELPH: 9.6 %
ALPHA2 GLOB MFR UR ELPH: 14.8 %
APPEARANCE UR: CLEAR
B-GLOBULIN MFR UR ELPH: 17.4 %
BILIRUB UR STRIP.AUTO-MCNC: NEGATIVE MG/DL
COLOR UR: COLORLESS
CREAT UR-MCNC: 20.4 MG/DL (ref 20–320)
GAMMA GLOB MFR UR ELPH: 17.7 %
GLUCOSE UR STRIP.AUTO-MCNC: NORMAL MG/DL
HOLD SPECIMEN: NORMAL
IMMUNOFIXATION COMMENT: NORMAL
INR PPP: 0.9 (ref 0.9–1.1)
KETONES UR STRIP.AUTO-MCNC: NEGATIVE MG/DL
LEUKOCYTE ESTERASE UR QL STRIP.AUTO: ABNORMAL
NITRITE UR QL STRIP.AUTO: NEGATIVE
PATH REVIEW - URINE IMMUNOFIXATION: NORMAL
PATH REVIEW-URINE PROTEIN ELECTROPHORESIS: NORMAL
PH UR STRIP.AUTO: 5.5 [PH]
PROT UR STRIP.AUTO-MCNC: NEGATIVE MG/DL
PROT UR-ACNC: 8 MG/DL (ref 5–24)
PROT/CREAT UR: 0.39 MG/MG CREAT (ref 0–0.17)
PROTHROMBIN TIME: 10 SECONDS (ref 9.8–12.4)
RBC # UR STRIP.AUTO: NEGATIVE MG/DL
RBC #/AREA URNS AUTO: ABNORMAL /HPF
SP GR UR STRIP.AUTO: 1
SQUAMOUS #/AREA URNS AUTO: ABNORMAL /HPF
URINE ELECTROPHORESIS COMMENT: NORMAL
UROBILINOGEN UR STRIP.AUTO-MCNC: NORMAL MG/DL
WBC #/AREA URNS AUTO: ABNORMAL /HPF

## 2025-04-14 PROCEDURE — 96402 CHEMO HORMON ANTINEOPL SQ/IM: CPT

## 2025-04-14 PROCEDURE — 3061F NEG MICROALBUMINURIA REV: CPT | Performed by: INTERNAL MEDICINE

## 2025-04-14 PROCEDURE — 87086 URINE CULTURE/COLONY COUNT: CPT | Performed by: INTERNAL MEDICINE

## 2025-04-14 PROCEDURE — 81001 URINALYSIS AUTO W/SCOPE: CPT | Performed by: INTERNAL MEDICINE

## 2025-04-14 PROCEDURE — 99215 OFFICE O/P EST HI 40 MIN: CPT | Performed by: INTERNAL MEDICINE

## 2025-04-14 PROCEDURE — 3008F BODY MASS INDEX DOCD: CPT | Performed by: INTERNAL MEDICINE

## 2025-04-14 PROCEDURE — 99215 OFFICE O/P EST HI 40 MIN: CPT | Mod: 25 | Performed by: INTERNAL MEDICINE

## 2025-04-14 PROCEDURE — 36415 COLL VENOUS BLD VENIPUNCTURE: CPT

## 2025-04-14 PROCEDURE — 85610 PROTHROMBIN TIME: CPT

## 2025-04-14 PROCEDURE — 3079F DIAST BP 80-89 MM HG: CPT | Performed by: INTERNAL MEDICINE

## 2025-04-14 PROCEDURE — 3077F SYST BP >= 140 MM HG: CPT | Performed by: INTERNAL MEDICINE

## 2025-04-14 PROCEDURE — 82570 ASSAY OF URINE CREATININE: CPT | Performed by: INTERNAL MEDICINE

## 2025-04-14 PROCEDURE — 2500000004 HC RX 250 GENERAL PHARMACY W/ HCPCS (ALT 636 FOR OP/ED): Mod: JZ | Performed by: INTERNAL MEDICINE

## 2025-04-14 RX ORDER — LIDOCAINE HYDROCHLORIDE 10 MG/ML
2 INJECTION, SOLUTION EPIDURAL; INFILTRATION; INTRACAUDAL; PERINEURAL ONCE
Status: COMPLETED | OUTPATIENT
Start: 2025-04-14 | End: 2025-04-14

## 2025-04-14 RX ORDER — LIDOCAINE HYDROCHLORIDE 10 MG/ML
2 INJECTION, SOLUTION EPIDURAL; INFILTRATION; INTRACAUDAL; PERINEURAL ONCE
OUTPATIENT
Start: 2025-07-07

## 2025-04-14 RX ORDER — LIDOCAINE AND PRILOCAINE 25; 25 MG/G; MG/G
CREAM TOPICAL
Qty: 250 G | Refills: 1 | Status: SHIPPED | OUTPATIENT
Start: 2025-04-14 | End: 2025-04-14

## 2025-04-14 RX ORDER — EPINEPHRINE 0.3 MG/.3ML
0.3 INJECTION SUBCUTANEOUS EVERY 5 MIN PRN
OUTPATIENT
Start: 2025-07-07

## 2025-04-14 RX ORDER — ALBUTEROL SULFATE 0.83 MG/ML
3 SOLUTION RESPIRATORY (INHALATION) AS NEEDED
OUTPATIENT
Start: 2025-07-07

## 2025-04-14 RX ORDER — DIPHENHYDRAMINE HYDROCHLORIDE 50 MG/ML
50 INJECTION, SOLUTION INTRAMUSCULAR; INTRAVENOUS AS NEEDED
OUTPATIENT
Start: 2025-07-07

## 2025-04-14 RX ORDER — FAMOTIDINE 10 MG/ML
20 INJECTION, SOLUTION INTRAVENOUS ONCE AS NEEDED
OUTPATIENT
Start: 2025-07-07

## 2025-04-14 RX ADMIN — LIDOCAINE HYDROCHLORIDE 20 MG: 10 INJECTION, SOLUTION EPIDURAL; INFILTRATION; INTRACAUDAL; PERINEURAL at 10:06

## 2025-04-14 RX ADMIN — GOSERELIN ACETATE 10.8 MG: 10.8 IMPLANT SUBCUTANEOUS at 10:06

## 2025-04-14 ASSESSMENT — ENCOUNTER SYMPTOMS
ABDOMINAL DISTENTION: 0
BRUISES/BLEEDS EASILY: 0
EXTREMITY WEAKNESS: 0
EYES NEGATIVE: 1
FEVER: 0
CHEST TIGHTNESS: 0
NERVOUS/ANXIOUS: 0
NAUSEA: 0
HEMATURIA: 0
DIARRHEA: 0
SEIZURES: 0
HEADACHES: 0
ADENOPATHY: 0
BACK PAIN: 0
SCLERAL ICTERUS: 0
HEMOPTYSIS: 0
CONSTIPATION: 0
DIFFICULTY URINATING: 0
HOT FLASHES: 0
CONSTITUTIONAL NEGATIVE: 1
MYALGIAS: 0
FATIGUE: 0
RESPIRATORY NEGATIVE: 1
CONFUSION: 0
DIAPHORESIS: 0
ABDOMINAL PAIN: 0
SLEEP DISTURBANCE: 0
DYSURIA: 0
PALPITATIONS: 0
COUGH: 0
EYE PROBLEMS: 0
APPETITE CHANGE: 0
NUMBNESS: 0
DEPRESSION: 0
SHORTNESS OF BREATH: 0
FREQUENCY: 0
BLOOD IN STOOL: 0
ARTHRALGIAS: 0
DIZZINESS: 0
WHEEZING: 0
LEG SWELLING: 0
CARDIOVASCULAR NEGATIVE: 1
CHILLS: 0

## 2025-04-14 ASSESSMENT — PAIN SCALES - GENERAL: PAINLEVEL_OUTOF10: 0-NO PAIN

## 2025-04-14 NOTE — PATIENT INSTRUCTIONS
Follow-up with hoa in 9 wks with labs  CT scans show continued response. Continue anastrozole only (no kisqali)  Will schedule a port placement

## 2025-04-14 NOTE — PROGRESS NOTES
SW assisted patient in completing [facility] paperwork for [employer]. [employer] document has been faxed to [facility] at [phone]. Send to medical records to also upload these documents to her medical record.    BOY Mcginnis

## 2025-04-14 NOTE — PROGRESS NOTES
[facility]  Location: [facility]      BREAST CANCER DIAGNOSIS  Malignant neoplasm of central portion of left breast in female, estrogen receptor positive, Clinical: Stage IV (cT4b, cN1(f), cM1, G2, ER+, WA+, HER2-)         CURRENT THERAPY  Anastrozole since 1/13/25, with goserelin. Ribociclib discontinued due to renal failure.    HISTORY OF PRESENT ILLNESS    Iva Pollock is a 54 y.o. woman with recent diagnosis of left sided breast cancer ER/WA+ with ulcerating mass which has been there for a while.  Initial staging scans showed some evidence of bone mets and confirmed by cerianna estradiol pet. CT scans show response to therapy.            Review of Systems   Constitutional: Negative.  Negative for appetite change, chills, diaphoresis, fatigue and fever.   HENT:  Negative.  Negative for hearing loss and lump/mass.    Eyes: Negative.  Negative for eye problems and icterus.   Respiratory: Negative.  Negative for chest tightness, cough, hemoptysis, shortness of breath and wheezing.    Cardiovascular: Negative.  Negative for chest pain, leg swelling and palpitations.   Gastrointestinal:  Negative for abdominal distention, abdominal pain, blood in stool, constipation, diarrhea and nausea.   Endocrine: Negative for hot flashes.   Genitourinary:  Negative for bladder incontinence, difficulty urinating, dyspareunia, dysuria, frequency, hematuria, menstrual problem and vaginal bleeding (+heavy menstruation).    Musculoskeletal:  Negative for arthralgias, back pain, gait problem and myalgias.        +back and hip pain non-radiating   Neurological:  Negative for dizziness, extremity weakness, gait problem, headaches, numbness and seizures.   Hematological:  Negative for adenopathy. Does not bruise/bleed easily.   Psychiatric/Behavioral:  Negative for confusion, depression and sleep disturbance. The patient is not nervous/anxious.    All other systems reviewed and are negative.        Past Medical  "History:  has a past medical history of Diabetes mellitus, type 2 (Multi), Gout, unspecified (2014), Other conditions influencing health status (2013), Other general symptoms and signs (2019), Pain in left shoulder (2016), Pain in right foot (2016), Pain in right hip (2016), Pain in unspecified foot (2014), Pneumonia, unspecified organism (2016), and Thrombocytopenia (CMS-[facility]).  Surgical History:   has a past surgical history that includes Appendectomy (2016) and Cholecystectomy.  Social History:   reports that she has never smoked. She has never used smokeless tobacco. She reports current alcohol use of about 1.0 standard drink of alcohol per week. She reports that she does not use drugs.  Family History:    Family History   Problem Relation Name Age of Onset    Diabetes Mother      Hypertension Father      Throat cancer Mother's Brother      Diabetes Maternal Grandmother      Diabetes Maternal Grandfather      Diabetes Paternal Grandmother      Diabetes Paternal Grandfather       Family Oncology History:  Cancer-related family history is not on file.      OBJECTIVE    VS / Pain:  /83   Pulse 63   Temp 36.8 °C (98.2 °F) (Temporal)   Resp 18   Ht 1.6 m (5' 2.99\")   Wt 76 kg (167 lb 8.8 oz)   SpO2 98%   BMI 29.69 kg/m²   BSA: 1.84 meters squared   Pain Scale: 3    Performance Status:  The ECOG performance scale today is ECO- Fully active, able to carry on all pre-disease performance w/o restriction.          Physical Exam  Constitutional:       General: She is not in acute distress.     Appearance: She is not ill-appearing, toxic-appearing or diaphoretic.   HENT:      Nose: No congestion or rhinorrhea.      Mouth/Throat:      Pharynx: No posterior oropharyngeal erythema.   Cardiovascular:      Rate and Rhythm: Normal rate and regular rhythm.      Pulses: Normal pulses.      Heart sounds: Normal heart sounds. No murmur heard.     No gallop. "   Pulmonary:      Breath sounds: Normal breath sounds.   Abdominal:      General: There is no distension.      Palpations: There is no mass.      Tenderness: There is no abdominal tenderness.   Musculoskeletal:         General: No swelling.      Cervical back: No rigidity.      Right lower leg: No edema.      Left lower leg: No edema.   Lymphadenopathy:      Cervical: No cervical adenopathy.   Skin:     General: Skin is warm.      Coloration: Skin is not cyanotic.      Findings: No bruising, ecchymosis or erythema.   Neurological:      General: No focal deficit present.      Mental Status: She is oriented to person, place, and time.      Cranial Nerves: Cranial nerves 2-12 are intact.      Motor: Motor function is intact.   Psychiatric:         Attention and Perception: Attention and perception normal.         Mood and Affect: Mood and affect normal.         Behavior: Behavior normal.         Thought Content: Thought content normal.         Judgment: Judgment normal.           Diagnostic Results   === 04/10/25 ===    CT CHEST ABDOMEN PELVIS W IV CONTRAST    - Impression -  Breast cancer restaging scan compared to PET-CT dated 01/09/2025.  Interval decrease in size of the left breast mass and adjacent soft  tissue deposit/lymph node, consistent with positive treatment  response. There has been interval increase in sclerotic lesions  throughout the axial and appendicular skeleton as described above,,  likely representing treatment related response. Mild circumferential  mural thickening and hyperemia of the rectum likely relates to  early/resolving colitis. No evidence of new metastatic disease in the  chest abdomen or pelvis. Additional stable chronic and incidental  findings as described above.      I personally reviewed the images/study and I agree with the findings  as stated by Resident Ce Cartagena.    MACRO:  None.    Signed by: Paul Solo 4/10/2025 9:36 PM  Dictation workstation:   [id number]      LABORATORY/PATHOLOGY DATA    Lab on 04/14/2025   Component Date Value Ref Range Status    Protime 04/14/2025 10.0  9.8 - 12.4 seconds Final    INR 04/14/2025 0.9  0.9 - 1.1 Final   Orders Only on 04/10/2025   Component Date Value Ref Range Status    Cystatin C 04/03/2025 1.59 (H)  0.61 - 0.95 mg/L Final    Creatinine 04/03/2025 1.41 (H)  0.59 - 1.01 mg/dL Final    eGFR BY Cystatin C 04/03/2025 42 (L)  >=60 Final    Phosphorus 04/03/2025 3.6  2.5 - 4.9 mg/dL Final   Lab on 04/10/2025   Component Date Value Ref Range Status    CA 27.29 04/10/2025 50.4 (H)  0.0 - 38.6 U/mL Final    WBC 04/10/2025 6.5  4.4 - 11.3 x10*3/uL Final    nRBC 04/10/2025 0.0  0.0 - 0.0 /100 WBCs Final    RBC 04/10/2025 4.11  4.00 - 5.20 x10*6/uL Final    Hemoglobin 04/10/2025 11.6 (L)  12.0 - 16.0 g/dL Final    Hematocrit 04/10/2025 36.1  36.0 - 46.0 % Final    MCV 04/10/2025 88  80 - 100 fL Final    MCH 04/10/2025 28.2  26.0 - 34.0 pg Final    MCHC 04/10/2025 32.1  32.0 - 36.0 g/dL Final    RDW 04/10/2025 14.4  11.5 - 14.5 % Final    Platelets 04/10/2025 315  150 - 450 x10*3/uL Final    Neutrophils % 04/10/2025 75.8  40.0 - 80.0 % Final    Immature Granulocytes %, Automated 04/10/2025 0.3  0.0 - 0.9 % Final    Lymphocytes % 04/10/2025 14.9  13.0 - 44.0 % Final    Monocytes % 04/10/2025 6.4  2.0 - 10.0 % Final    Eosinophils % 04/10/2025 2.0  0.0 - 6.0 % Final    Basophils % 04/10/2025 0.6  0.0 - 2.0 % Final    Neutrophils Absolute 04/10/2025 4.94  1.20 - 7.70 x10*3/uL Final    Immature Granulocytes Absolute, Au* 04/10/2025 0.02  0.00 - 0.70 x10*3/uL Final    Lymphocytes Absolute 04/10/2025 0.97 (L)  1.20 - 4.80 x10*3/uL Final    Monocytes Absolute 04/10/2025 0.42  0.10 - 1.00 x10*3/uL Final    Eosinophils Absolute 04/10/2025 0.13  0.00 - 0.70 x10*3/uL Final    Basophils Absolute 04/10/2025 0.04  0.00 - 0.10 x10*3/uL Final    Glucose 04/10/2025 101 (H)  74 - 99 mg/dL Final    Sodium 04/10/2025 138  136 - 145 mmol/L Final    Potassium  04/10/2025 4.0  3.5 - 5.3 mmol/L Final    Chloride 04/10/2025 103  98 - 107 mmol/L Final    Bicarbonate 04/10/2025 23  21 - 32 mmol/L Final    Anion Gap 04/10/2025 16  10 - 20 mmol/L Final    Urea Nitrogen 04/10/2025 10  6 - 23 mg/dL Final    Creatinine 04/10/2025 1.18 (H)  0.50 - 1.05 mg/dL Final    eGFR 04/10/2025 55 (L)  >60 mL/min/1.73m*2 Final    Calcium 04/10/2025 9.2  8.6 - 10.6 mg/dL Final    Albumin 04/10/2025 4.0  3.4 - 5.0 g/dL Final    Alkaline Phosphatase 04/10/2025 70  33 - 110 U/L Final    Total Protein 04/10/2025 8.2  6.4 - 8.2 g/dL Final    AST 04/10/2025 14  9 - 39 U/L Final    Bilirubin, Total 04/10/2025 0.5  0.0 - 1.2 mg/dL Final    ALT 04/10/2025 8  7 - 45 U/L Final    Total Protein, Urine Random 04/10/2025 17  5 - 25 mg/dL Final   Appointment on 04/08/2025   Component Date Value Ref Range Status    Extra Tube 04/08/2025 Hold for add-ons.   Final   Office Visit on 04/07/2025   Component Date Value Ref Range Status    Albumin, Urine Random 04/10/2025 18.9  Not established mg/L Final    Creatinine, Urine Random 04/10/2025 101.6  20.0 - 320.0 mg/dL Final    Albumin/Creatinine Ratio 04/10/2025 18.6  <30.0 ug/mg Creat Final    Total Protein, Urine Random 04/14/2025 8  5 - 24 mg/dL Final    Creatinine, Urine Random 04/14/2025 20.4  20.0 - 320.0 mg/dL Final    T. Protein/Creatinine Ratio 04/14/2025 0.39 (H)  0.00 - 0.17 mg/mg Creat Final    Ig Kappa Free Light Chain 04/08/2025 2.82 (H)  0.33 - 1.94 mg/dL Final    Ig Lambda Free Light Chain 04/08/2025 2.82 (H)  0.57 - 2.63 mg/dL Final    Kappa/Lambda Ratio 04/08/2025 1.00  0.26 - 1.65 Final    Uric Acid 04/08/2025 5.2  2.3 - 6.7 mg/dL Final    C3 Complement 04/08/2025 181  87 - 200 mg/dL Final    C4 Complement 04/08/2025 46  10 - 50 mg/dL Final    Total Protein 04/08/2025 7.5  6.4 - 8.2 g/dL Final    Albumin 04/08/2025 3.7  3.4 - 5.0 g/dL Final    Alpha 1 Globulin 04/08/2025 0.5  0.2 - 0.6 g/dL Final    Alpha 2 Globulin 04/08/2025 1.2 (H)  0.4 - 1.1  g/dL Final    Beta Globulin 04/08/2025 0.9  0.5 - 1.2 g/dL Final    Gamma 04/08/2025 1.1  0.5 - 1.4 g/dL Final    Protein Electrophoresis Comment 04/08/2025 Increased level of alpha 2 globulins.       Final    Immunofixation Comment 04/08/2025 No monoclonal protein detected by immunofixation.   Final    Path Review - Serum Protein Electr* 04/08/2025 Reviewed and approved by GUERLINE CARROLL on 4/10/25 at 8:39 PM.       Final    Path Review - Serum Immunofixation 04/08/2025 Reviewed and approved by GUERLINE CARROLL on 4/10/25 at 8:39 PM.       Final    Color, Urine 04/14/2025 Colorless (N)  Light-Yellow, Yellow, Dark-Yellow Final    Appearance, Urine 04/14/2025 Clear  Clear Final    Specific Gravity, Urine 04/14/2025 1.004 (N)  1.005 - 1.035 Final    pH, Urine 04/14/2025 5.5  5.0, 5.5, 6.0, 6.5, 7.0, 7.5, 8.0 Final    Protein, Urine 04/14/2025 NEGATIVE  NEGATIVE, 10 (TRACE), 20 (TRACE) mg/dL Final    Glucose, Urine 04/14/2025 Normal  Normal mg/dL Final    Blood, Urine 04/14/2025 NEGATIVE  NEGATIVE mg/dL Final    Ketones, Urine 04/14/2025 NEGATIVE  NEGATIVE mg/dL Final    Bilirubin, Urine 04/14/2025 NEGATIVE  NEGATIVE mg/dL Final    Urobilinogen, Urine 04/14/2025 Normal  Normal mg/dL Final    Nitrite, Urine 04/14/2025 NEGATIVE  NEGATIVE Final    Leukocyte Esterase, Urine 04/14/2025 75 Anthony/uL (A)  NEGATIVE Final    WBC, Urine 04/14/2025 6-10 (A)  1-5, NONE /HPF Final    RBC, Urine 04/14/2025 NONE  NONE, 1-2, 3-5 /HPF Final    Squamous Epithelial Cells, Urine 04/14/2025 1-9 (SPARSE)  Reference range not established. /HPF Final   Lab on 04/03/2025   Component Date Value Ref Range Status    Glucose 04/03/2025 97  74 - 99 mg/dL Final    Sodium 04/03/2025 140  136 - 145 mmol/L Final    Potassium 04/03/2025 4.0  3.5 - 5.3 mmol/L Final    Chloride 04/03/2025 104  98 - 107 mmol/L Final    Bicarbonate 04/03/2025 24  21 - 32 mmol/L Final    Anion Gap 04/03/2025 16  10 - 20 mmol/L Final    Urea Nitrogen 04/03/2025 9  6 - 23 mg/dL  Final    Creatinine 04/03/2025 1.38 (H)  0.50 - 1.05 mg/dL Final    eGFR 04/03/2025 45 (L)  >60 mL/min/1.73m*2 Final    Calcium 04/03/2025 9.6  8.6 - 10.3 mg/dL Final    Albumin 04/03/2025 4.5  3.4 - 5.0 g/dL Final    Alkaline Phosphatase 04/03/2025 72  33 - 110 U/L Final    Total Protein 04/03/2025 8.0  6.4 - 8.2 g/dL Final    AST 04/03/2025 21  9 - 39 U/L Final    Bilirubin, Total 04/03/2025 0.5  0.0 - 1.2 mg/dL Final    ALT 04/03/2025 12  7 - 45 U/L Final    WBC 04/03/2025 5.9  4.4 - 11.3 x10*3/uL Final    nRBC 04/03/2025 0.0  0.0 - 0.0 /100 WBCs Final    RBC 04/03/2025 4.15  4.00 - 5.20 x10*6/uL Final    Hemoglobin 04/03/2025 11.9 (L)  12.0 - 16.0 g/dL Final    Hematocrit 04/03/2025 35.9 (L)  36.0 - 46.0 % Final    MCV 04/03/2025 87  80 - 100 fL Final    MCH 04/03/2025 28.7  26.0 - 34.0 pg Final    MCHC 04/03/2025 33.1  32.0 - 36.0 g/dL Final    RDW 04/03/2025 14.3  11.5 - 14.5 % Final    Platelets 04/03/2025 314  150 - 450 x10*3/uL Final    Neutrophils % 04/03/2025 74.3  40.0 - 80.0 % Final    Immature Granulocytes %, Automated 04/03/2025 0.5  0.0 - 0.9 % Final    Lymphocytes % 04/03/2025 16.0  13.0 - 44.0 % Final    Monocytes % 04/03/2025 7.0  2.0 - 10.0 % Final    Eosinophils % 04/03/2025 1.5  0.0 - 6.0 % Final    Basophils % 04/03/2025 0.7  0.0 - 2.0 % Final    Neutrophils Absolute 04/03/2025 4.38  1.20 - 7.70 x10*3/uL Final    Immature Granulocytes Absolute, Au* 04/03/2025 0.03  0.00 - 0.70 x10*3/uL Final    Lymphocytes Absolute 04/03/2025 0.94 (L)  1.20 - 4.80 x10*3/uL Final    Monocytes Absolute 04/03/2025 0.41  0.10 - 1.00 x10*3/uL Final    Eosinophils Absolute 04/03/2025 0.09  0.00 - 0.70 x10*3/uL Final    Basophils Absolute 04/03/2025 0.04  0.00 - 0.10 x10*3/uL Final   Lab on 03/24/2025   Component Date Value Ref Range Status    Glucose 03/24/2025 143 (H)  74 - 99 mg/dL Final    Sodium 03/24/2025 138  136 - 145 mmol/L Final    Potassium 03/24/2025 4.6  3.5 - 5.3 mmol/L Final    Chloride 03/24/2025 104   98 - 107 mmol/L Final    Bicarbonate 03/24/2025 23  21 - 32 mmol/L Final    Anion Gap 03/24/2025 16  10 - 20 mmol/L Final    Urea Nitrogen 03/24/2025 9  6 - 23 mg/dL Final    Creatinine 03/24/2025 1.66 (H)  0.50 - 1.05 mg/dL Final    eGFR 03/24/2025 36 (L)  >60 mL/min/1.73m*2 Final    Calcium 03/24/2025 9.3  8.6 - 10.3 mg/dL Final    Albumin 03/24/2025 4.3  3.4 - 5.0 g/dL Final    Alkaline Phosphatase 03/24/2025 75  33 - 110 U/L Final    Total Protein 03/24/2025 7.8  6.4 - 8.2 g/dL Final    AST 03/24/2025 15  9 - 39 U/L Final    Bilirubin, Total 03/24/2025 0.6  0.0 - 1.2 mg/dL Final    ALT 03/24/2025 9  7 - 45 U/L Final    WBC 03/24/2025 4.7  4.4 - 11.3 x10*3/uL Final    nRBC 03/24/2025 0.0  0.0 - 0.0 /100 WBCs Final    RBC 03/24/2025 4.25  4.00 - 5.20 x10*6/uL Final    Hemoglobin 03/24/2025 12.1  12.0 - 16.0 g/dL Final    Hematocrit 03/24/2025 37.2  36.0 - 46.0 % Final    MCV 03/24/2025 88  80 - 100 fL Final    MCH 03/24/2025 28.5  26.0 - 34.0 pg Final    MCHC 03/24/2025 32.5  32.0 - 36.0 g/dL Final    RDW 03/24/2025 14.9 (H)  11.5 - 14.5 % Final    Platelets 03/24/2025 220  150 - 450 x10*3/uL Final    Neutrophils % 03/24/2025 68.4  40.0 - 80.0 % Final    Immature Granulocytes %, Automated 03/24/2025 0.2  0.0 - 0.9 % Final    Lymphocytes % 03/24/2025 18.6  13.0 - 44.0 % Final    Monocytes % 03/24/2025 9.4  2.0 - 10.0 % Final    Eosinophils % 03/24/2025 1.9  0.0 - 6.0 % Final    Basophils % 03/24/2025 1.5  0.0 - 2.0 % Final    Neutrophils Absolute 03/24/2025 3.19  1.20 - 7.70 x10*3/uL Final    Immature Granulocytes Absolute, Au* 03/24/2025 0.01  0.00 - 0.70 x10*3/uL Final    Lymphocytes Absolute 03/24/2025 0.87 (L)  1.20 - 4.80 x10*3/uL Final    Monocytes Absolute 03/24/2025 0.44  0.10 - 1.00 x10*3/uL Final    Eosinophils Absolute 03/24/2025 0.09  0.00 - 0.70 x10*3/uL Final    Basophils Absolute 03/24/2025 0.07  0.00 - 0.10 x10*3/uL Final   Orders Only on 03/17/2025   Component Date Value Ref Range Status     Phosphorus 03/17/2025 2.9  2.5 - 4.9 mg/dL Final   Lab on 03/17/2025   Component Date Value Ref Range Status    WBC 03/17/2025 4.8  4.4 - 11.3 x10*3/uL Final    nRBC 03/17/2025 0.0  0.0 - 0.0 /100 WBCs Final    RBC 03/17/2025 4.42  4.00 - 5.20 x10*6/uL Final    Hemoglobin 03/17/2025 12.5  12.0 - 16.0 g/dL Final    Hematocrit 03/17/2025 38.6  36.0 - 46.0 % Final    MCV 03/17/2025 87  80 - 100 fL Final    MCH 03/17/2025 28.3  26.0 - 34.0 pg Final    MCHC 03/17/2025 32.4  32.0 - 36.0 g/dL Final    RDW 03/17/2025 14.6 (H)  11.5 - 14.5 % Final    Platelets 03/17/2025 318  150 - 450 x10*3/uL Final    Neutrophils % 03/17/2025 68.6  40.0 - 80.0 % Final    Immature Granulocytes %, Automated 03/17/2025 0.2  0.0 - 0.9 % Final    Lymphocytes % 03/17/2025 19.8  13.0 - 44.0 % Final    Monocytes % 03/17/2025 8.2  2.0 - 10.0 % Final    Eosinophils % 03/17/2025 2.1  0.0 - 6.0 % Final    Basophils % 03/17/2025 1.1  0.0 - 2.0 % Final    Neutrophils Absolute 03/17/2025 3.26  1.20 - 7.70 x10*3/uL Final    Immature Granulocytes Absolute, Au* 03/17/2025 0.01  0.00 - 0.70 x10*3/uL Final    Lymphocytes Absolute 03/17/2025 0.94 (L)  1.20 - 4.80 x10*3/uL Final    Monocytes Absolute 03/17/2025 0.39  0.10 - 1.00 x10*3/uL Final    Eosinophils Absolute 03/17/2025 0.10  0.00 - 0.70 x10*3/uL Final    Basophils Absolute 03/17/2025 0.05  0.00 - 0.10 x10*3/uL Final    Glucose 03/17/2025 106 (H)  74 - 99 mg/dL Final    Sodium 03/17/2025 138  136 - 145 mmol/L Final    Potassium 03/17/2025 4.6  3.5 - 5.3 mmol/L Final    Chloride 03/17/2025 105  98 - 107 mmol/L Final    Bicarbonate 03/17/2025 22  21 - 32 mmol/L Final    Anion Gap 03/17/2025 16  10 - 20 mmol/L Final    Urea Nitrogen 03/17/2025 12  6 - 23 mg/dL Final    Creatinine 03/17/2025 1.85 (H)  0.50 - 1.05 mg/dL Final    eGFR 03/17/2025 32 (L)  >60 mL/min/1.73m*2 Final    Calcium 03/17/2025 9.4  8.6 - 10.3 mg/dL Final    Albumin 03/17/2025 4.6  3.4 - 5.0 g/dL Final    Alkaline Phosphatase 03/17/2025  76  33 - 110 U/L Final    Total Protein 03/17/2025 8.2  6.4 - 8.2 g/dL Final    AST 03/17/2025 22  9 - 39 U/L Final    Bilirubin, Total 03/17/2025 0.6  0.0 - 1.2 mg/dL Final    ALT 03/17/2025 12  7 - 45 U/L Final   Lab on 03/10/2025   Component Date Value Ref Range Status    Glucose 03/10/2025 130 (H)  74 - 99 mg/dL Final    Sodium 03/10/2025 139  136 - 145 mmol/L Final    Potassium 03/10/2025 4.6  3.5 - 5.3 mmol/L Final    Chloride 03/10/2025 106  98 - 107 mmol/L Final    Bicarbonate 03/10/2025 23  21 - 32 mmol/L Final    Anion Gap 03/10/2025 15  10 - 20 mmol/L Final    Urea Nitrogen 03/10/2025 16  6 - 23 mg/dL Final    Creatinine 03/10/2025 2.14 (H)  0.50 - 1.05 mg/dL Final    eGFR 03/10/2025 27 (L)  >60 mL/min/1.73m*2 Final    Calcium 03/10/2025 9.1  8.6 - 10.3 mg/dL Final    Albumin 03/10/2025 4.5  3.4 - 5.0 g/dL Final    Alkaline Phosphatase 03/10/2025 69  33 - 110 U/L Final    Total Protein 03/10/2025 8.0  6.4 - 8.2 g/dL Final    AST 03/10/2025 17  9 - 39 U/L Final    Bilirubin, Total 03/10/2025 0.6  0.0 - 1.2 mg/dL Final    ALT 03/10/2025 11  7 - 45 U/L Final    CA 27.29 03/10/2025 73.2 (H)  0.0 - 38.6 U/mL Final   There may be more visits with results that are not included.      Lab Results   Component Value Date    LABCA2 50.4 (H) 04/10/2025    LABCA2 73.2 (H) 03/10/2025    LABCA2 93.2 (H) 01/06/2025          IMPRESSION/PLAN    Stage 4 ER+/VT+/HER2- breast cancer with evidence of bone mets on CT positive on estradiol PET.Tolerating anastrozole/goserelin with response clinically, by ca27-29 and by CT scans. Continue q3 month zometa or switch to denosumab. Keep holding ribo due to renal toxicity.8 RTC in 3 months with CT scans.  Poor IV access. Will arrange for her to get a port    We discussed the clinical significance of diagnosis, goals of care and treatment plan in detail.     I personally spent over half of a total 35  minutes face to face with the patient in counseling and discussion and/or coordination  of care as described above.         -------------------------------------------------------------------------------------------------------------------------------  Ivan Marinelli MD  Director of Breast Cancer Medical Oncology Research Program   [facility]  Professor of Medicine  [place] [address],  [address] [id number]  Phone: [phone]  [email or web address]

## 2025-04-14 NOTE — PROGRESS NOTES
Iva Pollock is a 54 y.o. female who presents in stable condition for zoladex injection after seeing her provider this morning    Since her last visit, she has been doing well.  Overall, she states her energy level is stable.  Her appetite & hydration status has been good.  She reports fatigue and hot flashes.  She has no other concerns.    Patient tolerated left subcutaneous injection well and has been educated with the overall therapy plan. Questions & concerns addressed by her provider during visit.  AVS, lab results & future appointment provided. Patient discharged in stable condition with her      Reviewed and approved by TERESA CASTILLO on 4/14/25 at 1015

## 2025-04-15 LAB — BACTERIA UR CULT: NORMAL

## 2025-04-23 ENCOUNTER — HOSPITAL ENCOUNTER (OUTPATIENT)
Dept: RADIOLOGY | Facility: HOSPITAL | Age: 55
Discharge: HOME | End: 2025-04-23
Payer: COMMERCIAL

## 2025-04-23 VITALS
SYSTOLIC BLOOD PRESSURE: 119 MMHG | RESPIRATION RATE: 17 BRPM | HEART RATE: 56 BPM | TEMPERATURE: 97.9 F | OXYGEN SATURATION: 100 % | DIASTOLIC BLOOD PRESSURE: 69 MMHG

## 2025-04-23 DIAGNOSIS — Z51.81 ENCOUNTER FOR MONITORING BISPHOSPHONATE THERAPY: ICD-10-CM

## 2025-04-23 DIAGNOSIS — Z51.11 ENCOUNTER FOR ANTINEOPLASTIC CHEMOTHERAPY: ICD-10-CM

## 2025-04-23 DIAGNOSIS — C50.112 MALIGNANT NEOPLASM OF CENTRAL PORTION OF LEFT BREAST IN FEMALE, ESTROGEN RECEPTOR POSITIVE: ICD-10-CM

## 2025-04-23 DIAGNOSIS — E28.39 SUPPRESSION OF OVARIAN SECRETION: ICD-10-CM

## 2025-04-23 DIAGNOSIS — Z17.0 MALIGNANT NEOPLASM OF CENTRAL PORTION OF LEFT BREAST IN FEMALE, ESTROGEN RECEPTOR POSITIVE: ICD-10-CM

## 2025-04-23 DIAGNOSIS — Z79.83 ENCOUNTER FOR MONITORING BISPHOSPHONATE THERAPY: ICD-10-CM

## 2025-04-23 DIAGNOSIS — Z51.81 ENCOUNTER FOR MONITORING AROMATASE INHIBITOR THERAPY: ICD-10-CM

## 2025-04-23 DIAGNOSIS — Z79.811 ENCOUNTER FOR MONITORING AROMATASE INHIBITOR THERAPY: ICD-10-CM

## 2025-04-23 PROCEDURE — 77001 FLUOROGUIDE FOR VEIN DEVICE: CPT | Performed by: RADIOLOGY

## 2025-04-23 PROCEDURE — 2500000004 HC RX 250 GENERAL PHARMACY W/ HCPCS (ALT 636 FOR OP/ED): Performed by: RADIOLOGY

## 2025-04-23 PROCEDURE — 7100000009 HC PHASE TWO TIME - INITIAL BASE CHARGE

## 2025-04-23 PROCEDURE — 36561 INSERT TUNNELED CV CATH: CPT | Mod: RT | Performed by: RADIOLOGY

## 2025-04-23 PROCEDURE — 2780000003 HC OR 278 NO HCPCS

## 2025-04-23 PROCEDURE — C1788 PORT, INDWELLING, IMP: HCPCS

## 2025-04-23 PROCEDURE — 76937 US GUIDE VASCULAR ACCESS: CPT | Performed by: RADIOLOGY

## 2025-04-23 PROCEDURE — 99152 MOD SED SAME PHYS/QHP 5/>YRS: CPT

## 2025-04-23 PROCEDURE — 7100000010 HC PHASE TWO TIME - EACH INCREMENTAL 1 MINUTE

## 2025-04-23 PROCEDURE — 99152 MOD SED SAME PHYS/QHP 5/>YRS: CPT | Performed by: RADIOLOGY

## 2025-04-23 RX ORDER — FENTANYL CITRATE 50 UG/ML
INJECTION, SOLUTION INTRAMUSCULAR; INTRAVENOUS
Status: COMPLETED | OUTPATIENT
Start: 2025-04-23 | End: 2025-04-23

## 2025-04-23 RX ORDER — MIDAZOLAM HYDROCHLORIDE 1 MG/ML
INJECTION INTRAMUSCULAR; INTRAVENOUS
Status: COMPLETED | OUTPATIENT
Start: 2025-04-23 | End: 2025-04-23

## 2025-04-23 RX ADMIN — MIDAZOLAM HYDROCHLORIDE 1 MG: 2 INJECTION, SOLUTION INTRAMUSCULAR; INTRAVENOUS at 12:44

## 2025-04-23 RX ADMIN — FENTANYL CITRATE 50 MCG: 50 INJECTION, SOLUTION INTRAMUSCULAR; INTRAVENOUS at 12:44

## 2025-04-23 ASSESSMENT — PAIN - FUNCTIONAL ASSESSMENT
PAIN_FUNCTIONAL_ASSESSMENT: 0-10
PAIN_FUNCTIONAL_ASSESSMENT: 0-10

## 2025-04-23 ASSESSMENT — PAIN SCALES - GENERAL

## 2025-04-23 NOTE — Clinical Note
8F/20cm RIJ single lumen PowerPort placed.  Local lidocaine to site, 50mcg fentanyl and 1mg versed given IVP.  Internal sutures, dermabond and steri strips to site.  Patient to be transferred to RPCU, RPCU RN given report.

## 2025-04-23 NOTE — PRE-PROCEDURE NOTE
INTERVENTIONAL RADIOLOGY PRE-PROCEDURE NOTE    Iva Pollock is a 54 y.o. female with PMHx of left breast cancer who presents to the interventional radiology department for mediport placement.    Procedure: mediport placement    Indication for procedure: Diagnoses of Malignant neoplasm of central portion of left breast in female, estrogen receptor positive, Encounter for antineoplastic chemotherapy, Suppression of ovarian secretion, Encounter for monitoring bisphosphonate therapy, and Encounter for monitoring aromatase inhibitor therapy were pertinent to this visit.    Medical History[1]   Surgical History[2]    Relevant Labs:   Lab Results   Component Value Date    CREATININE 1.18 (H) 04/10/2025    EGFR 55 (L) 04/10/2025    INR 0.9 04/14/2025    PROTIME 10.0 04/14/2025       Planned Sedation/Anesthesia: Moderate    Directed physical examination:    General: Normal appearance, behavior, cognition and NAD  Heart: Heart regular rate and rhythm  Lungs: No increased work of breathing  Abdomen: soft and nontender  Psych: oriented to time, place and person    Current Medications[3]     Mallampati: II (hard and soft palate, upper portion of tonsils anduvula visible)    ASA Score: ASA 2 - Patient with mild systemic disease with no functional limitations    Benefits, risks and alternatives of procedure and planned sedation have been discussed with the patient and/or their representative. All questions answered and they agree to proceed.     Prasanna Garcia MD, PGY-6  Vascular & Interventional Radiology  IR pager: [phone]    NON-Urgent on call weekends and after hours weekdays (5pm - 5am) IR pager: [phone]  Urgent & emergent on call weekends and after hours weekdays (5pm-7am) IR pager: [phone]          [1]   Past Medical History:  Diagnosis Date    Diabetes mellitus, type 2 (Multi)     Gout, unspecified 07/26/2014    Acute gout    Other conditions influencing health status 12/26/2013    Influenza Type A    Other general symptoms  and signs 06/04/2019    Suspected dengue fever    Pain in left shoulder 09/19/2016    Left shoulder pain    Pain in right foot 06/12/2016    Right foot pain    Pain in right hip 09/19/2016    Right hip pain    Pain in unspecified foot 07/26/2014    Foot pain    Pneumonia, unspecified organism 03/13/2016    Community acquired pneumonia    Thrombocytopenia (CMS-HCC)    [2]   Past Surgical History:  Procedure Laterality Date    APPENDECTOMY  09/19/2016    Appendectomy    CHOLECYSTECTOMY     [3]   Current Outpatient Medications:     amLODIPine (Norvasc) 5 mg tablet, Take 1 tablet (5 mg) by mouth once daily., Disp: 30 tablet, Rfl: 5    anastrozole (Arimidex) 1 mg tablet, Take 1 tablet (1 mg total) by mouth once daily.  Swallow whole with a drink of water., Disp: 90 tablet, Rfl: 3    blood sugar diagnostic (Accu-Chek Guide test strips) strip, 1 strip 2 times a day., Disp: 200 strip, Rfl: 3    blood-glucose meter (Accu-Chek Guide Glucose Meter) misc, Use as directed to test blood sugar daily, Disp: 1 each, Rfl: 0    lancets (Accu-Chek Fastclix Lancet Drum) misc, Use 1 lancet to check blood sugar twice a day as directed., Disp: 200 each, Rfl: 1    metFORMIN XR (Glucophage-XR) 500 mg 24 hr tablet, Take 2 tablets (1,000 mg) by mouth 2 times daily (morning and late afternoon). Do not crush, chew, or split., Disp: 200 tablet, Rfl: 3    metoprolol succinate XL (Toprol-XL) 50 mg 24 hr tablet, Take 1 tablet (50 mg) by mouth once daily. Do not crush or chew., Disp: 30 tablet, Rfl: 5    ribociclib (Kisqali) 2 x 200 mg tablet therapy pack, Take 2 tablets (400 mg total) by mouth in the morning for 21 days, followed by 7 days off per 28-day treatment cycle., Disp: 42 each, Rfl: 2    rosuvastatin (Crestor) 10 mg tablet, Take 1 tablet (10 mg) by mouth once daily., Disp: 100 tablet, Rfl: 3  No current facility-administered medications for this encounter.

## 2025-04-23 NOTE — POST-PROCEDURE NOTE
Interventional Radiology Post-Procedure Note    Right chest wall mediport placement    Procedure Details:  Technically successful and uncomplicated right chest wall mediport placement. Mediport may be utilized immediately.  Please see PACS for full procedural details.    Patient Tolerance: good  Complications: None    Indication for procedure: Diagnoses of Malignant neoplasm of central portion of left breast in female, estrogen receptor positive, Encounter for antineoplastic chemotherapy, Suppression of ovarian secretion, Encounter for monitoring bisphosphonate therapy, and Encounter for monitoring aromatase inhibitor therapy were pertinent to this visit.    Pre-Procedure Verification and Time Out:  · Procedure Location procedure area   · HUDDLE - Pre-procedure Verification completed   · TIME OUT - Final Verification completed immediately prior to procedure start   · DEBRIEF completed     General Information:  Date/Time of Procedure: 04/23/25 at 1:56 PM  Indication(s): left breast cancer  Findings: See PACS  Procedure performed by: Prasanna Garcia MD   Assistant(s): Dr. Johnny Parekh MD  Estimated Blood Loss (mL): minimal  Specimen: No  Informed Consent: written consent obtained    Prep:  Ultrasound Guided Insertion: Yes  Large Drape, Hand Hygiene, Surgical Cap, Surgical Mask, Sterile Gown, Sterile Gloves, Glasses, and Scrubs  Patient Position: Supine  Site Prep: chlorhexidine, draped, usual sterile procedure followed    Anesthesia/Medications:  Procedural Sedation:  Fentanyl: 50 mcg  Versed: 1 mg  1% Lidocaine with Epinephrine: 15 mL    Prasanna Garcia MD, PGY-6  Interventional Radiology  IR pager: [phone]    NON-Urgent on call weekends and after hours weekdays (5pm - 5am) IR pager: [phone]  Urgent & emergent on call weekends and after hours weekdays (5pm-7am) IR pager: [phone]

## 2025-04-23 NOTE — DISCHARGE INSTRUCTIONS
You received moderate sedation:  - Do not drive a car, or operate any machinery or power tools of any kind.  - Do not drink any alcoholic drinks.  - Do not take any over the counter medications that may cause drowsiness.  - Do not make any important decisions or sign any legal documents.  - You need to have a responsible adult accompany you home.  - You may resume your normal diet.  - We strongly suggest that a responsible adult be with you for the rest of the day and also during the night. This is for your protection and safety.     For questions related to your procedure:  Please call [phone] between the hours of 7:00am-5:00pm Monday through Friday.  Please call [phone] after 5:00pm and on weekends and holidays.     In the event of an emergency call 911 or go to your nearest emergency room.

## 2025-04-24 DIAGNOSIS — C79.51 MALIGNANT NEOPLASM METASTATIC TO BONE (MULTI): Primary | ICD-10-CM

## 2025-05-12 ENCOUNTER — LAB (OUTPATIENT)
Dept: HEMATOLOGY/ONCOLOGY | Facility: HOSPITAL | Age: 55
End: 2025-05-12
Payer: COMMERCIAL

## 2025-05-12 ENCOUNTER — APPOINTMENT (OUTPATIENT)
Dept: HEMATOLOGY/ONCOLOGY | Facility: HOSPITAL | Age: 55
End: 2025-05-12
Payer: COMMERCIAL

## 2025-05-12 DIAGNOSIS — Z17.0 MALIGNANT NEOPLASM OF CENTRAL PORTION OF LEFT BREAST IN FEMALE, ESTROGEN RECEPTOR POSITIVE: ICD-10-CM

## 2025-05-12 DIAGNOSIS — C50.112 MALIGNANT NEOPLASM OF CENTRAL PORTION OF LEFT BREAST IN FEMALE, ESTROGEN RECEPTOR POSITIVE: ICD-10-CM

## 2025-05-12 DIAGNOSIS — C79.51 MALIGNANT NEOPLASM METASTATIC TO BONE (MULTI): ICD-10-CM

## 2025-05-12 LAB
ALBUMIN SERPL BCP-MCNC: 4.1 G/DL (ref 3.4–5)
ALP SERPL-CCNC: 44 U/L (ref 33–110)
ALT SERPL W P-5'-P-CCNC: 12 U/L (ref 7–45)
ANION GAP SERPL CALC-SCNC: 14 MMOL/L (ref 10–20)
AST SERPL W P-5'-P-CCNC: 19 U/L (ref 9–39)
BASOPHILS # BLD AUTO: 0.03 X10*3/UL (ref 0–0.1)
BASOPHILS NFR BLD AUTO: 0.6 %
BILIRUB SERPL-MCNC: 0.6 MG/DL (ref 0–1.2)
BUN SERPL-MCNC: 11 MG/DL (ref 6–23)
CALCIUM SERPL-MCNC: 9.3 MG/DL (ref 8.6–10.3)
CANCER AG27-29 SERPL-ACNC: 34 U/ML (ref 0–38.6)
CHLORIDE SERPL-SCNC: 105 MMOL/L (ref 98–107)
CO2 SERPL-SCNC: 26 MMOL/L (ref 21–32)
CREAT SERPL-MCNC: 1.06 MG/DL (ref 0.5–1.05)
EGFRCR SERPLBLD CKD-EPI 2021: 62 ML/MIN/1.73M*2
EOSINOPHIL # BLD AUTO: 0.07 X10*3/UL (ref 0–0.7)
EOSINOPHIL NFR BLD AUTO: 1.4 %
ERYTHROCYTE [DISTWIDTH] IN BLOOD BY AUTOMATED COUNT: 14.4 % (ref 11.5–14.5)
GLUCOSE SERPL-MCNC: 105 MG/DL (ref 74–99)
HCT VFR BLD AUTO: 33.5 % (ref 36–46)
HGB BLD-MCNC: 10.7 G/DL (ref 12–16)
IMM GRANULOCYTES # BLD AUTO: 0.01 X10*3/UL (ref 0–0.7)
IMM GRANULOCYTES NFR BLD AUTO: 0.2 % (ref 0–0.9)
LYMPHOCYTES # BLD AUTO: 1.03 X10*3/UL (ref 1.2–4.8)
LYMPHOCYTES NFR BLD AUTO: 21.3 %
MCH RBC QN AUTO: 28.8 PG (ref 26–34)
MCHC RBC AUTO-ENTMCNC: 31.9 G/DL (ref 32–36)
MCV RBC AUTO: 90 FL (ref 80–100)
MONOCYTES # BLD AUTO: 0.35 X10*3/UL (ref 0.1–1)
MONOCYTES NFR BLD AUTO: 7.2 %
NEUTROPHILS # BLD AUTO: 3.35 X10*3/UL (ref 1.2–7.7)
NEUTROPHILS NFR BLD AUTO: 69.3 %
NRBC BLD-RTO: 0 /100 WBCS (ref 0–0)
PLATELET # BLD AUTO: 203 X10*3/UL (ref 150–450)
POTASSIUM SERPL-SCNC: 3.7 MMOL/L (ref 3.5–5.3)
PROT SERPL-MCNC: 7 G/DL (ref 6.4–8.2)
RBC # BLD AUTO: 3.72 X10*6/UL (ref 4–5.2)
SODIUM SERPL-SCNC: 141 MMOL/L (ref 136–145)
WBC # BLD AUTO: 4.8 X10*3/UL (ref 4.4–11.3)

## 2025-05-12 PROCEDURE — 85025 COMPLETE CBC W/AUTO DIFF WBC: CPT

## 2025-05-12 PROCEDURE — 2500000004 HC RX 250 GENERAL PHARMACY W/ HCPCS (ALT 636 FOR OP/ED): Mod: JZ | Performed by: INTERNAL MEDICINE

## 2025-05-12 PROCEDURE — 86300 IMMUNOASSAY TUMOR CA 15-3: CPT

## 2025-05-12 PROCEDURE — 84075 ASSAY ALKALINE PHOSPHATASE: CPT

## 2025-05-12 PROCEDURE — 36591 DRAW BLOOD OFF VENOUS DEVICE: CPT

## 2025-05-12 RX ORDER — HEPARIN SODIUM,PORCINE/PF 10 UNIT/ML
50 SYRINGE (ML) INTRAVENOUS AS NEEDED
OUTPATIENT
Start: 2025-05-12

## 2025-05-12 RX ORDER — HEPARIN 100 UNIT/ML
500 SYRINGE INTRAVENOUS AS NEEDED
OUTPATIENT
Start: 2025-05-12

## 2025-05-12 RX ORDER — HEPARIN 100 UNIT/ML
500 SYRINGE INTRAVENOUS AS NEEDED
Status: DISCONTINUED | OUTPATIENT
Start: 2025-05-12 | End: 2025-05-12 | Stop reason: HOSPADM

## 2025-05-12 RX ADMIN — HEPARIN 500 UNITS: 100 SYRINGE at 10:52

## 2025-05-13 ENCOUNTER — SOCIAL WORK (OUTPATIENT)
Dept: CASE MANAGEMENT | Facility: HOSPITAL | Age: 55
End: 2025-05-13
Payer: COMMERCIAL

## 2025-05-13 NOTE — PROGRESS NOTES
[facility] paperwork has been completed and has been faxed to [phone] and will be uploaded to patient's medical record.    BOY Mcginnis

## 2025-05-27 ENCOUNTER — INFUSION (OUTPATIENT)
Dept: HEMATOLOGY/ONCOLOGY | Facility: HOSPITAL | Age: 55
End: 2025-05-27
Payer: COMMERCIAL

## 2025-05-27 VITALS
TEMPERATURE: 96.8 F | SYSTOLIC BLOOD PRESSURE: 137 MMHG | WEIGHT: 173 LBS | DIASTOLIC BLOOD PRESSURE: 90 MMHG | RESPIRATION RATE: 18 BRPM | BODY MASS INDEX: 30.65 KG/M2 | HEART RATE: 62 BPM | OXYGEN SATURATION: 99 %

## 2025-05-27 DIAGNOSIS — C50.112 MALIGNANT NEOPLASM OF CENTRAL PORTION OF LEFT BREAST IN FEMALE, ESTROGEN RECEPTOR POSITIVE: ICD-10-CM

## 2025-05-27 DIAGNOSIS — C79.51 MALIGNANT NEOPLASM METASTATIC TO BONE (MULTI): ICD-10-CM

## 2025-05-27 DIAGNOSIS — Z17.0 MALIGNANT NEOPLASM OF CENTRAL PORTION OF LEFT BREAST IN FEMALE, ESTROGEN RECEPTOR POSITIVE: ICD-10-CM

## 2025-05-27 PROCEDURE — 96372 THER/PROPH/DIAG INJ SC/IM: CPT

## 2025-05-27 PROCEDURE — 2500000004 HC RX 250 GENERAL PHARMACY W/ HCPCS (ALT 636 FOR OP/ED): Mod: JZ | Performed by: NURSE PRACTITIONER

## 2025-05-27 RX ORDER — EPINEPHRINE 0.3 MG/.3ML
0.3 INJECTION SUBCUTANEOUS EVERY 5 MIN PRN
OUTPATIENT
Start: 2025-08-19

## 2025-05-27 RX ORDER — FAMOTIDINE 10 MG/ML
20 INJECTION, SOLUTION INTRAVENOUS ONCE AS NEEDED
OUTPATIENT
Start: 2025-08-19

## 2025-05-27 RX ORDER — ALBUTEROL SULFATE 0.83 MG/ML
3 SOLUTION RESPIRATORY (INHALATION) AS NEEDED
OUTPATIENT
Start: 2025-08-19

## 2025-05-27 RX ORDER — DIPHENHYDRAMINE HYDROCHLORIDE 50 MG/ML
50 INJECTION, SOLUTION INTRAMUSCULAR; INTRAVENOUS AS NEEDED
OUTPATIENT
Start: 2025-08-19

## 2025-05-27 RX ADMIN — DENOSUMAB 120 MG: 120 INJECTION SUBCUTANEOUS at 09:08

## 2025-05-27 NOTE — PROGRESS NOTES
Iva Pollock is a 54 y.o. female who presents in stable condition for xgeva every 12 weeks --- she was switch from zometa to xgeva due to elevated kidney function.    Since her last visit, she has been doing well.  Overall, she states her energy level is stable.  Her appetite & hydration status has been good.  She reports arthralgias.  She has no other concerns. She understands importance of taking daily Calcium with vitamin D3.    Patient tolerated left subcutaneous injection well and has been educated with the overall therapy plan. She has no other questions or concerns at this time. AVS & future appointment provided. Patient discharged in stable condition.    Reviewed and approved by TERESA CASTILLO on 5/27/25 at 9:26 AM.

## 2025-06-10 ENCOUNTER — APPOINTMENT (OUTPATIENT)
Dept: PRIMARY CARE | Facility: CLINIC | Age: 55
End: 2025-06-10
Payer: COMMERCIAL

## 2025-06-10 VITALS
WEIGHT: 172.4 LBS | TEMPERATURE: 97.3 F | SYSTOLIC BLOOD PRESSURE: 163 MMHG | DIASTOLIC BLOOD PRESSURE: 90 MMHG | BODY MASS INDEX: 30.55 KG/M2 | HEART RATE: 55 BPM | HEIGHT: 63 IN | OXYGEN SATURATION: 99 %

## 2025-06-10 DIAGNOSIS — I10 PRIMARY HYPERTENSION: Primary | ICD-10-CM

## 2025-06-10 DIAGNOSIS — E11.65 TYPE 2 DIABETES MELLITUS WITH HYPERGLYCEMIA, WITHOUT LONG-TERM CURRENT USE OF INSULIN: ICD-10-CM

## 2025-06-10 DIAGNOSIS — E78.9 LIPID DISORDER: ICD-10-CM

## 2025-06-10 PROCEDURE — 3080F DIAST BP >= 90 MM HG: CPT | Performed by: FAMILY MEDICINE

## 2025-06-10 PROCEDURE — 3061F NEG MICROALBUMINURIA REV: CPT | Performed by: FAMILY MEDICINE

## 2025-06-10 PROCEDURE — 99214 OFFICE O/P EST MOD 30 MIN: CPT | Performed by: FAMILY MEDICINE

## 2025-06-10 PROCEDURE — 3008F BODY MASS INDEX DOCD: CPT | Performed by: FAMILY MEDICINE

## 2025-06-10 PROCEDURE — 3077F SYST BP >= 140 MM HG: CPT | Performed by: FAMILY MEDICINE

## 2025-06-10 RX ORDER — AMLODIPINE BESYLATE 10 MG/1
10 TABLET ORAL DAILY
Qty: 30 TABLET | Refills: 5 | Status: SHIPPED | OUTPATIENT
Start: 2025-06-10 | End: 2025-12-07

## 2025-06-10 ASSESSMENT — PAIN SCALES - GENERAL: PAINLEVEL_OUTOF10: 0-NO PAIN

## 2025-06-10 ASSESSMENT — ENCOUNTER SYMPTOMS
OCCASIONAL FEELINGS OF UNSTEADINESS: 0
LOSS OF SENSATION IN FEET: 0
DEPRESSION: 0

## 2025-06-10 ASSESSMENT — PATIENT HEALTH QUESTIONNAIRE - PHQ9
2. FEELING DOWN, DEPRESSED OR HOPELESS: NOT AT ALL
1. LITTLE INTEREST OR PLEASURE IN DOING THINGS: NOT AT ALL
SUM OF ALL RESPONSES TO PHQ9 QUESTIONS 1 AND 2: 0

## 2025-06-10 ASSESSMENT — COLUMBIA-SUICIDE SEVERITY RATING SCALE - C-SSRS
1. IN THE PAST MONTH, HAVE YOU WISHED YOU WERE DEAD OR WISHED YOU COULD GO TO SLEEP AND NOT WAKE UP?: NO
2. HAVE YOU ACTUALLY HAD ANY THOUGHTS OF KILLING YOURSELF?: NO

## 2025-06-10 NOTE — PROGRESS NOTES
This is a 54-year-old female who is undergoing treatment for breast cancer estrogen receptor positive stage IV    Also chronic renal failure secondary to chemotherapy which is on hold for  Neyda    She is under nephrology care    Today she here for follow-up on hypertension hyperlipidemia and diabetes    She is feeling extremely well has started painting the hallway of her house    Will be starting back going to work in August    For her increased blood pressure I will increase amlodipine to 10 mg from 5 mg    Continue with metoprolol    Will see her back in December for her annual physical    Sent to the lab to get her lipid panel drawn and A1c

## 2025-06-16 ENCOUNTER — LAB (OUTPATIENT)
Dept: HEMATOLOGY/ONCOLOGY | Facility: HOSPITAL | Age: 55
End: 2025-06-16
Payer: COMMERCIAL

## 2025-06-16 ENCOUNTER — OFFICE VISIT (OUTPATIENT)
Dept: HEMATOLOGY/ONCOLOGY | Facility: HOSPITAL | Age: 55
End: 2025-06-16
Payer: COMMERCIAL

## 2025-06-16 ENCOUNTER — APPOINTMENT (OUTPATIENT)
Dept: NEPHROLOGY | Facility: CLINIC | Age: 55
End: 2025-06-16
Payer: COMMERCIAL

## 2025-06-16 ENCOUNTER — SOCIAL WORK (OUTPATIENT)
Dept: CASE MANAGEMENT | Facility: HOSPITAL | Age: 55
End: 2025-06-16

## 2025-06-16 VITALS
RESPIRATION RATE: 20 BRPM | DIASTOLIC BLOOD PRESSURE: 94 MMHG | HEART RATE: 60 BPM | SYSTOLIC BLOOD PRESSURE: 157 MMHG | BODY MASS INDEX: 30.72 KG/M2 | HEIGHT: 63 IN | WEIGHT: 173.4 LBS | OXYGEN SATURATION: 98 % | TEMPERATURE: 97 F

## 2025-06-16 VITALS
HEIGHT: 63 IN | DIASTOLIC BLOOD PRESSURE: 88 MMHG | HEART RATE: 62 BPM | WEIGHT: 173 LBS | SYSTOLIC BLOOD PRESSURE: 128 MMHG | BODY MASS INDEX: 30.65 KG/M2

## 2025-06-16 DIAGNOSIS — N17.9 AKI (ACUTE KIDNEY INJURY): Primary | ICD-10-CM

## 2025-06-16 DIAGNOSIS — Z17.0 MALIGNANT NEOPLASM OF CENTRAL PORTION OF LEFT BREAST IN FEMALE, ESTROGEN RECEPTOR POSITIVE: ICD-10-CM

## 2025-06-16 DIAGNOSIS — Z17.0 MALIGNANT NEOPLASM OF CENTRAL PORTION OF LEFT BREAST IN FEMALE, ESTROGEN RECEPTOR POSITIVE: Primary | ICD-10-CM

## 2025-06-16 DIAGNOSIS — Z79.811 ENCOUNTER FOR MONITORING AROMATASE INHIBITOR THERAPY: ICD-10-CM

## 2025-06-16 DIAGNOSIS — C50.112 MALIGNANT NEOPLASM OF CENTRAL PORTION OF LEFT BREAST IN FEMALE, ESTROGEN RECEPTOR POSITIVE: Primary | ICD-10-CM

## 2025-06-16 DIAGNOSIS — D64.9 ANEMIA, UNSPECIFIED TYPE: ICD-10-CM

## 2025-06-16 DIAGNOSIS — C50.112 MALIGNANT NEOPLASM OF CENTRAL PORTION OF LEFT BREAST IN FEMALE, ESTROGEN RECEPTOR POSITIVE: ICD-10-CM

## 2025-06-16 DIAGNOSIS — Z51.81 ENCOUNTER FOR MONITORING BISPHOSPHONATE THERAPY: ICD-10-CM

## 2025-06-16 DIAGNOSIS — Z79.83 ENCOUNTER FOR MONITORING BISPHOSPHONATE THERAPY: ICD-10-CM

## 2025-06-16 DIAGNOSIS — C79.51 MALIGNANT NEOPLASM METASTATIC TO BONE (MULTI): ICD-10-CM

## 2025-06-16 DIAGNOSIS — Z51.81 ENCOUNTER FOR MONITORING AROMATASE INHIBITOR THERAPY: ICD-10-CM

## 2025-06-16 DIAGNOSIS — E28.39 SUPPRESSION OF OVARIAN SECRETION: ICD-10-CM

## 2025-06-16 DIAGNOSIS — I10 ESSENTIAL HYPERTENSION: ICD-10-CM

## 2025-06-16 PROBLEM — K52.1 CHEMOTHERAPY INDUCED DIARRHEA: Status: RESOLVED | Noted: 2025-02-17 | Resolved: 2025-06-16

## 2025-06-16 PROBLEM — T45.1X5A CHEMOTHERAPY INDUCED DIARRHEA: Status: RESOLVED | Noted: 2025-02-17 | Resolved: 2025-06-16

## 2025-06-16 PROBLEM — Z51.11 ENCOUNTER FOR ANTINEOPLASTIC CHEMOTHERAPY: Status: RESOLVED | Noted: 2025-02-16 | Resolved: 2025-06-16

## 2025-06-16 PROBLEM — T45.1X5A CHEMOTHERAPY INDUCED NAUSEA AND VOMITING: Status: RESOLVED | Noted: 2025-02-17 | Resolved: 2025-06-16

## 2025-06-16 PROBLEM — R11.2 CHEMOTHERAPY INDUCED NAUSEA AND VOMITING: Status: RESOLVED | Noted: 2025-02-17 | Resolved: 2025-06-16

## 2025-06-16 LAB
ALBUMIN SERPL BCP-MCNC: 4.3 G/DL (ref 3.4–5)
ALP SERPL-CCNC: 37 U/L (ref 33–110)
ALT SERPL W P-5'-P-CCNC: 12 U/L (ref 7–45)
ANION GAP SERPL CALC-SCNC: 14 MMOL/L (ref 10–20)
AST SERPL W P-5'-P-CCNC: 18 U/L (ref 9–39)
BASOPHILS # BLD AUTO: 0.03 X10*3/UL (ref 0–0.1)
BASOPHILS NFR BLD AUTO: 0.5 %
BILIRUB DIRECT SERPL-MCNC: 0.1 MG/DL (ref 0–0.3)
BILIRUB SERPL-MCNC: 0.6 MG/DL (ref 0–1.2)
BUN SERPL-MCNC: 14 MG/DL (ref 6–23)
CALCIUM SERPL-MCNC: 9.8 MG/DL (ref 8.6–10.3)
CANCER AG27-29 SERPL-ACNC: 38.3 U/ML (ref 0–38.6)
CHLORIDE SERPL-SCNC: 103 MMOL/L (ref 98–107)
CHOLEST SERPL-MCNC: 154 MG/DL (ref 0–199)
CHOLESTEROL/HDL RATIO: 2.6
CO2 SERPL-SCNC: 26 MMOL/L (ref 21–32)
CREAT SERPL-MCNC: 1.05 MG/DL (ref 0.5–1.05)
EGFRCR SERPLBLD CKD-EPI 2021: 63 ML/MIN/1.73M*2
EOSINOPHIL # BLD AUTO: 0.1 X10*3/UL (ref 0–0.7)
EOSINOPHIL NFR BLD AUTO: 1.8 %
ERYTHROCYTE [DISTWIDTH] IN BLOOD BY AUTOMATED COUNT: 13.2 % (ref 11.5–14.5)
EST. AVERAGE GLUCOSE BLD GHB EST-MCNC: 111 MG/DL
FERRITIN SERPL-MCNC: 241 NG/ML (ref 8–150)
FOLATE SERPL-MCNC: >24 NG/ML
GLUCOSE SERPL-MCNC: 84 MG/DL (ref 74–99)
HBA1C MFR BLD: 5.5 % (ref ?–5.7)
HCT VFR BLD AUTO: 34.2 % (ref 36–46)
HDLC SERPL-MCNC: 60.2 MG/DL
HGB BLD-MCNC: 10.9 G/DL (ref 12–16)
IMM GRANULOCYTES # BLD AUTO: 0.01 X10*3/UL (ref 0–0.7)
IMM GRANULOCYTES NFR BLD AUTO: 0.2 % (ref 0–0.9)
IRON SATN MFR SERPL: 21 % (ref 25–45)
IRON SERPL-MCNC: 66 UG/DL (ref 35–150)
LDLC SERPL CALC-MCNC: 62 MG/DL
LYMPHOCYTES # BLD AUTO: 1.28 X10*3/UL (ref 1.2–4.8)
LYMPHOCYTES NFR BLD AUTO: 23 %
MCH RBC QN AUTO: 28.8 PG (ref 26–34)
MCHC RBC AUTO-ENTMCNC: 31.9 G/DL (ref 32–36)
MCV RBC AUTO: 90 FL (ref 80–100)
MONOCYTES # BLD AUTO: 0.35 X10*3/UL (ref 0.1–1)
MONOCYTES NFR BLD AUTO: 6.3 %
NEUTROPHILS # BLD AUTO: 3.8 X10*3/UL (ref 1.2–7.7)
NEUTROPHILS NFR BLD AUTO: 68.2 %
NON HDL CHOLESTEROL: 94 MG/DL (ref 0–149)
NRBC BLD-RTO: 0 /100 WBCS (ref 0–0)
PLATELET # BLD AUTO: 224 X10*3/UL (ref 150–450)
POTASSIUM SERPL-SCNC: 3.9 MMOL/L (ref 3.5–5.3)
PROT SERPL-MCNC: 7.3 G/DL (ref 6.4–8.2)
RBC # BLD AUTO: 3.79 X10*6/UL (ref 4–5.2)
SODIUM SERPL-SCNC: 139 MMOL/L (ref 136–145)
TIBC SERPL-MCNC: 314 UG/DL (ref 240–445)
TRIGL SERPL-MCNC: 159 MG/DL (ref 0–149)
UIBC SERPL-MCNC: 248 UG/DL (ref 110–370)
VIT B12 SERPL-MCNC: 369 PG/ML (ref 211–911)
VLDL: 32 MG/DL (ref 0–40)
WBC # BLD AUTO: 5.6 X10*3/UL (ref 4.4–11.3)

## 2025-06-16 PROCEDURE — 3061F NEG MICROALBUMINURIA REV: CPT | Performed by: NURSE PRACTITIONER

## 2025-06-16 PROCEDURE — 80053 COMPREHEN METABOLIC PANEL: CPT

## 2025-06-16 PROCEDURE — 82607 VITAMIN B-12: CPT | Performed by: FAMILY MEDICINE

## 2025-06-16 PROCEDURE — 85025 COMPLETE CBC W/AUTO DIFF WBC: CPT

## 2025-06-16 PROCEDURE — 3008F BODY MASS INDEX DOCD: CPT | Performed by: NURSE PRACTITIONER

## 2025-06-16 PROCEDURE — 83036 HEMOGLOBIN GLYCOSYLATED A1C: CPT | Performed by: FAMILY MEDICINE

## 2025-06-16 PROCEDURE — 1036F TOBACCO NON-USER: CPT | Performed by: NURSE PRACTITIONER

## 2025-06-16 PROCEDURE — 4010F ACE/ARB THERAPY RXD/TAKEN: CPT | Performed by: NURSE PRACTITIONER

## 2025-06-16 PROCEDURE — 86300 IMMUNOASSAY TUMOR CA 15-3: CPT

## 2025-06-16 PROCEDURE — 3044F HG A1C LEVEL LT 7.0%: CPT | Performed by: INTERNAL MEDICINE

## 2025-06-16 PROCEDURE — 3074F SYST BP LT 130 MM HG: CPT | Performed by: INTERNAL MEDICINE

## 2025-06-16 PROCEDURE — 82728 ASSAY OF FERRITIN: CPT | Performed by: FAMILY MEDICINE

## 2025-06-16 PROCEDURE — 3048F LDL-C <100 MG/DL: CPT | Performed by: INTERNAL MEDICINE

## 2025-06-16 PROCEDURE — 3077F SYST BP >= 140 MM HG: CPT | Performed by: NURSE PRACTITIONER

## 2025-06-16 PROCEDURE — 36591 DRAW BLOOD OFF VENOUS DEVICE: CPT

## 2025-06-16 PROCEDURE — 4010F ACE/ARB THERAPY RXD/TAKEN: CPT | Performed by: INTERNAL MEDICINE

## 2025-06-16 PROCEDURE — 3044F HG A1C LEVEL LT 7.0%: CPT | Performed by: NURSE PRACTITIONER

## 2025-06-16 PROCEDURE — 1036F TOBACCO NON-USER: CPT | Performed by: INTERNAL MEDICINE

## 2025-06-16 PROCEDURE — 82248 BILIRUBIN DIRECT: CPT | Performed by: FAMILY MEDICINE

## 2025-06-16 PROCEDURE — 3061F NEG MICROALBUMINURIA REV: CPT | Performed by: INTERNAL MEDICINE

## 2025-06-16 PROCEDURE — 3048F LDL-C <100 MG/DL: CPT | Performed by: NURSE PRACTITIONER

## 2025-06-16 PROCEDURE — 99215 OFFICE O/P EST HI 40 MIN: CPT | Performed by: NURSE PRACTITIONER

## 2025-06-16 PROCEDURE — 80061 LIPID PANEL: CPT | Performed by: FAMILY MEDICINE

## 2025-06-16 PROCEDURE — 2500000004 HC RX 250 GENERAL PHARMACY W/ HCPCS (ALT 636 FOR OP/ED): Performed by: INTERNAL MEDICINE

## 2025-06-16 PROCEDURE — 83540 ASSAY OF IRON: CPT | Performed by: FAMILY MEDICINE

## 2025-06-16 PROCEDURE — 3008F BODY MASS INDEX DOCD: CPT | Performed by: INTERNAL MEDICINE

## 2025-06-16 PROCEDURE — 82746 ASSAY OF FOLIC ACID SERUM: CPT

## 2025-06-16 PROCEDURE — 99214 OFFICE O/P EST MOD 30 MIN: CPT | Performed by: INTERNAL MEDICINE

## 2025-06-16 PROCEDURE — 3080F DIAST BP >= 90 MM HG: CPT | Performed by: NURSE PRACTITIONER

## 2025-06-16 PROCEDURE — 3079F DIAST BP 80-89 MM HG: CPT | Performed by: INTERNAL MEDICINE

## 2025-06-16 RX ORDER — HEPARIN SODIUM,PORCINE/PF 10 UNIT/ML
50 SYRINGE (ML) INTRAVENOUS AS NEEDED
OUTPATIENT
Start: 2025-06-16

## 2025-06-16 RX ORDER — HEPARIN 100 UNIT/ML
500 SYRINGE INTRAVENOUS AS NEEDED
Status: DISCONTINUED | OUTPATIENT
Start: 2025-06-16 | End: 2025-06-16 | Stop reason: HOSPADM

## 2025-06-16 RX ORDER — HEPARIN 100 UNIT/ML
500 SYRINGE INTRAVENOUS AS NEEDED
OUTPATIENT
Start: 2025-06-16

## 2025-06-16 RX ORDER — LOSARTAN POTASSIUM 50 MG/1
50 TABLET ORAL DAILY
Qty: 30 TABLET | Refills: 11 | Status: SHIPPED | OUTPATIENT
Start: 2025-06-16 | End: 2026-06-16

## 2025-06-16 RX ADMIN — HEPARIN 500 UNITS: 100 SYRINGE at 10:39

## 2025-06-16 ASSESSMENT — PAIN SCALES - GENERAL
PAINLEVEL_OUTOF10: 0-NO PAIN
PAINLEVEL_OUTOF10: 0-NO PAIN

## 2025-06-16 NOTE — PROGRESS NOTES
Nephrology Outpatient Follow-up Patient Note    Chief Concern:  Follow-up for THANG    History Of Present Illness   Iva Pollock is a 54 y.o. female with a history of  HTN, T2D, obesity, recently diagnosed Malignant neoplasm of left breast, estrogen receptor positive, Clinical: Stage IV . Breast Ca tx: Anastrozole since 1/13/25, goserelin 1/13/25,  and ribociclib (kisqali) since 1/27/25  - Started taking kisqali on January 2025, poor appetite, followed by rising Scr. Bscr 0.9, got better stopping ribo and resume again with new rising Scr again.  Decrease eGFR confirmed by Cystatin C. Today Scr is 1.08, cbc OK. Ribo remains on hold   Off losartan  - on metformin  Normal CBC , no eosinophilia  Improving UPCR, last down to 0.4, no more glucose, neg proteinuric work up   Normal Kidney US from Feb    - she fells OK, her BP is getting higher and last week PCP increased amlodipine dose      Past Medical History  She has a past medical history of Diabetes mellitus, type 2 (Multi), Gout, unspecified (07/26/2014), Other conditions influencing health status (12/26/2013), Other general symptoms and signs (06/04/2019), Pain in left shoulder (09/19/2016), Pain in right foot (06/12/2016), Pain in right hip (09/19/2016), Pain in unspecified foot (07/26/2014), Pneumonia, unspecified organism (03/13/2016), and Thrombocytopenia.  Patient Active Problem List   Diagnosis    Fever    Head ache    Obesity (BMI 30-39.9)    Polyphagia    Suspected dengue fever    Low platelet count    Type 2 diabetes mellitus with hyperglycemia, without long-term current use of insulin    Malignant neoplasm of central portion of left breast in female, estrogen receptor positive    Malignant neoplasm metastatic to bone (Multi)    Encounter for monitoring aromatase inhibitor therapy    Suppression of ovarian secretion    Encounter for monitoring bisphosphonate therapy    Chemotherapy-induced fatigue       Surgical History  She has a past surgical history  "that includes Appendectomy (09/19/2016) and Cholecystectomy.     Social History  She reports that she has never smoked. She has never used smokeless tobacco. She reports current alcohol use of about 1.0 standard drink of alcohol per week. She reports that she does not use drugs.    Family History  Family History   Problem Relation Name Age of Onset    Diabetes Mother      Hypertension Father      Throat cancer Mother's Brother      Diabetes Maternal Grandmother      Diabetes Maternal Grandfather      Diabetes Paternal Grandmother      Diabetes Paternal Grandfather          Allergies  Sulfa (sulfonamide antibiotics) and Sulfamethoxazole-trimethoprim    Review of Systems  A 12-point review of systems was performed and noted be negative except for that which was mentioned in the history of present illness     Last Recorded Vitals  /88 Comment: 5 min quite b/p  Pulse 62   Ht 1.6 m (5' 3\")   Wt 78.5 kg (173 lb)   BMI 30.65 kg/m²       Physical Exam:  Constitutional:  No acute distress  Respiration:  Breathing comfortably, no stridor  Cardiovascular:  No clubbing/cyanosis/edema in hands  Eyes:  EOM intact, sclera normal  Neuro:  Alert and oriented times 3, Cranial nerves II-XII grossly intact and symmetric bilaterally. No asterixis  Head and Face:  Symmetric facial features, no masses or lesions, sinuses non-tender to palpation  Neck/Lymph:  No LAD, no thyroid masses, trachea midline, No JVD  Skin:  no visible rash or scratching marks   Psych:  Alert and oriented with appropriate mood and affect  R wrist and R elbow joint inflammation, tender      Medications:  Medication Documentation Review Audit       Reviewed by Johnathan Zapien MA (Medical Assistant) on 06/16/25 at 1107      Medication Order Taking? Sig Documenting Provider Last Dose Status   amLODIPine (Norvasc) 10 mg tablet [id number] Yes Take 1 tablet (10 mg) by mouth once daily. Nancy Belcher MD  Active     Discontinued 06/10/25 0958 " "  anastrozole (Arimidex) 1 mg tablet [id number] Yes Take 1 tablet (1 mg total) by mouth once daily.  Swallow whole with a drink of water. Ruby Stewart, APRN-CNP  Active   blood sugar diagnostic (Accu-Chek Guide test strips) strip [id number] Yes 1 strip 2 times a day. Rachael Avery MD Taking Active   blood-glucose meter (Accu-Chek Guide Glucose Meter) misc [id number] Yes Use as directed to test blood sugar daily Rachael Avery MD Taking Active   lancets (Accu-Chek Fastclix Lancet Drum) misc [id number] Yes Use 1 lancet to check blood sugar twice a day as directed. Rachael Avery MD Taking Active   metFORMIN XR (Glucophage-XR) 500 mg 24 hr tablet [id number] Yes Take 2 tablets (1,000 mg) by mouth 2 times daily (morning and late afternoon). Do not crush, chew, or split. Nancy Belcher MD  Active   metoprolol succinate XL (Toprol-XL) 50 mg 24 hr tablet [id number] Yes Take 1 tablet (50 mg) by mouth once daily. Do not crush or chew. Nancy Belcher MD  Active   ribociclib (Kisqali) 2 x 200 mg tablet therapy pack [id number]  Take 2 tablets (400 mg total) by mouth in the morning for 21 days, followed by 7 days off per 28-day treatment cycle. Ivan Marinelli MD  Active   rosuvastatin (Crestor) 10 mg tablet [id number] Yes Take 1 tablet (10 mg) by mouth once daily. Nancy Belcher MD  Active                    Relevant Results Recent labs reviewed in the EMR.  Lab Results   Component Value Date    HGB 10.9 (L) 06/16/2025    HGB 10.7 (L) 05/12/2025    HGB 11.6 (L) 04/10/2025    MCV 90 06/16/2025    MCV 90 05/12/2025    MCV 88 04/10/2025     06/16/2025     05/12/2025     04/10/2025       No results found for: \"FERRITIN\", \"IRON\"    Lab Results   Component Value Date     06/16/2025    K 3.9 06/16/2025     06/16/2025    BUN 14 06/16/2025    CREATININE 1.05 06/16/2025       Imaging:  I personally reviewed then and this is my impression:        Assessment/Plan   1. THANG (acute " kidney injury) (Primary)  - THANG is clearly related to ribociclib use, improving Scr while on hold. Most likely tubular injury, Scr down to 1, improving low grade albuminuria.   - Follow Up In Nephrology  - losartan (Cozaar) 50 mg tablet; Take 1 tablet (50 mg) by mouth once daily.  Dispense: 30 tablet; Refill: 11  - Albumin-Creatinine Ratio, Urine Random; Future  - Protein, Urine Random; Future  - Follow Up In Nephrology; Future    2. Essential hypertension  - restarting losartan, decrease amlodipine dose, but of to stop one medication will favor metoprolol  - losartan (Cozaar) 50 mg tablet; Take 1 tablet (50 mg) by mouth once daily.  Dispense: 30 tablet; Refill: 11  - Follow Up In Nephrology; Future     RTO 4 months   Pritesh Bryant MD  Nephrology and Hypertension

## 2025-06-16 NOTE — PROGRESS NOTES
[facility] Paperwork has been completed and faxed to her employer. It has also bee uploaded to her medical record.    BOY Mcginnis

## 2025-06-16 NOTE — PROGRESS NOTES
[facility]  Location: [facility]      BREAST CANCER DIAGNOSIS  Malignant neoplasm of central portion of left breast in female, estrogen receptor positive, Clinical: Stage IV (cT4b, cN1(f), cM1, G2, ER+, TX+, HER2-)         CURRENT THERAPY  Anastrozole since 1/13/25, with goserelin. Ribociclib discontinued due to renal failure.  3/3/25 Zometa.  Jan 2025 Tempus with PIK3CA mutation     HISTORY OF PRESENT ILLNESS    Iva Pollock is a 54 y.o. woman with recent diagnosis of left sided breast cancer ER/TX+ with ulcerating mass which has been there for a while.  Initial staging scans showed some evidence of bone mets and confirmed by cerianna estradiol pet.     Yash presents today for IV breast cancer tx follow up. Today I have reviewed with the patient I will be conducting a clinical physical breast exam. Patient has declined a second medical professional today during the exam as a chapperone. She is unaccompanied today.     She continues compliant on daily anastrozole. Updated CT in April showed stable scan. She reports intermittent twinges of pain in the left breast that is not bothersome or requiring any over the counter pain medication.      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 She continues to work with adequate water intake close to 2 liters.      She reports stable fatigue and is planning to go back to work in August.     She denies any chest pain, no cough issues or sob. She continues to check her BP at home, is a bit higher. Continues to work with PCP and had increase in Amlodipine. Reports mild intermittent ankle swelling.      She denies any vision changes or headache issues, dizziness or loss of balance, no falls. She denies any baseline neuropathy as she has DM II.      She reports appetite is back to normal. Denies any issues with bowels and urination. She denies any issues with sexual dysfunction.     She reports  "baseline left chest wall lesion continues to shrink and is dry. She otherwise denies any skin lesions or masses, oral sores.      Last period was 2024, denies any breakthrough bleeding       Review of Systems  ROS 14 points performed, See HPI for exceptions      Past Medical History:  has a past medical history of Diabetes mellitus, type 2 (Multi), Gout, unspecified (2014), Other conditions influencing health status (2013), Other general symptoms and signs (2019), Pain in left shoulder (2016), Pain in right foot (2016), Pain in right hip (2016), Pain in unspecified foot (2014), Pneumonia, unspecified organism (2016), and Thrombocytopenia.  Surgical History:   has a past surgical history that includes Appendectomy (2016) and Cholecystectomy.  Social History:   reports that she has never smoked. She has never used smokeless tobacco. She reports current alcohol use of about 1.0 standard drink of alcohol per week. She reports that she does not use drugs.  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 She lives with her  Jose Luis- main support person.      GYN:     Family History:    Family History   Problem Relation Name Age of Onset    Diabetes Mother      Hypertension Father      Throat cancer Mother's Brother      Diabetes Maternal Grandmother      Diabetes Maternal Grandfather      Diabetes Paternal Grandmother      Diabetes Paternal Grandfather       Family Oncology History:  Cancer-related family history is not on file.      OBJECTIVE    VS / Pain:  BP (!) 157/94   Pulse 60   Temp 36.1 °C (97 °F) (Temporal)   Resp 20   Ht 1.6 m (5' 2.99\")   Wt 78.7 kg (173 lb 6.4 oz)   SpO2 98%   BMI 30.72 kg/m²   BSA: 1.87 meters squared   Pain Scale: 3    Performance Status:  The ECOG performance scale today is ECO- Fully active, able to carry on all pre-disease performance w/o restriction.        Physical " Exam  Constitutional: Well developed, awake/alert/oriented x4, no distress, alert and cooperative  EYES: Sclera clear  ENMT: mucous membranes moist, no apparent injury, no lesions seen  Head/Neck: Neck supple, no apparent injury, thyroid without mass or tenderness, No JVD, trachea midline, no bruits  Respiratory / Thoracic: Patent airways, clear to all lobes, normal breath sounds with good chest expansion, thorax symmetric.  Cardiovascular: Regular, rate and rhythm, no murmurs, 2+ equal pulses of the extremities, normal auscultated S 1and S 2  GI: Nondistended, soft, non-tender, no rebound tenderness or guarding, no masses palpable, no organomegaly, +BS, no bruits  Musculoskeletal: ROM intact, no joint swelling, normal strength, no spinal tenderness  Extremities: normal extremities, no cyanosis edema, contusions or wounds, no clubbing  Neurological: alert and oriented x4, intact senses, motor, response and reflexes, normal strength  Breast: 3.5-4 cm lesion, now more flat and scabbed. No palpable left lymph node today on exam.  No masses right breast.   Lymphatic: No cervical, supraclavicular, infraclavicular or axillary lymphadenopathy  Psychological: Appropriate and talkative mood and behavior  Skin: Warm and dry, no lesions, no rashes, no jaundice        Diagnostic Results   === 04/10/25 ===    CT CHEST ABDOMEN PELVIS W IV CONTRAST    - Impression -  Breast cancer restaging scan compared to PET-CT dated 01/09/2025.  Interval decrease in size of the left breast mass and adjacent soft  tissue deposit/lymph node, consistent with positive treatment  response. There has been interval increase in sclerotic lesions  throughout the axial and appendicular skeleton as described above,,  likely representing treatment related response. Mild circumferential  mural thickening and hyperemia of the rectum likely relates to  early/resolving colitis. No evidence of new metastatic disease in the  chest abdomen or pelvis. Additional  stable chronic and incidental  findings as described above.      I personally reviewed the images/study and I agree with the findings  as stated by Resident Ce Cartagena.    MACRO:  None.    Signed by: Paul Solo 4/10/2025 9:36 PM  Dictation workstation:   [id number]     LABORATORY/PATHOLOGY DATA    Lab on 06/16/2025   Component Date Value Ref Range Status    WBC 06/16/2025 5.6  4.4 - 11.3 x10*3/uL Final    nRBC 06/16/2025 0.0  0.0 - 0.0 /100 WBCs Final    RBC 06/16/2025 3.79 (L)  4.00 - 5.20 x10*6/uL Final    Hemoglobin 06/16/2025 10.9 (L)  12.0 - 16.0 g/dL Final    Hematocrit 06/16/2025 34.2 (L)  36.0 - 46.0 % Final    MCV 06/16/2025 90  80 - 100 fL Final    MCH 06/16/2025 28.8  26.0 - 34.0 pg Final    MCHC 06/16/2025 31.9 (L)  32.0 - 36.0 g/dL Final    RDW 06/16/2025 13.2  11.5 - 14.5 % Final    Platelets 06/16/2025 224  150 - 450 x10*3/uL Final    Neutrophils % 06/16/2025 68.2  40.0 - 80.0 % Final    Immature Granulocytes %, Automated 06/16/2025 0.2  0.0 - 0.9 % Final    Lymphocytes % 06/16/2025 23.0  13.0 - 44.0 % Final    Monocytes % 06/16/2025 6.3  2.0 - 10.0 % Final    Eosinophils % 06/16/2025 1.8  0.0 - 6.0 % Final    Basophils % 06/16/2025 0.5  0.0 - 2.0 % Final    Neutrophils Absolute 06/16/2025 3.80  1.20 - 7.70 x10*3/uL Final    Immature Granulocytes Absolute, Au* 06/16/2025 0.01  0.00 - 0.70 x10*3/uL Final    Lymphocytes Absolute 06/16/2025 1.28  1.20 - 4.80 x10*3/uL Final    Monocytes Absolute 06/16/2025 0.35  0.10 - 1.00 x10*3/uL Final    Eosinophils Absolute 06/16/2025 0.10  0.00 - 0.70 x10*3/uL Final    Basophils Absolute 06/16/2025 0.03  0.00 - 0.10 x10*3/uL Final    Glucose 06/16/2025 84  74 - 99 mg/dL Final    Sodium 06/16/2025 139  136 - 145 mmol/L Final    Potassium 06/16/2025 3.9  3.5 - 5.3 mmol/L Final    Chloride 06/16/2025 103  98 - 107 mmol/L Final    Bicarbonate 06/16/2025 26  21 - 32 mmol/L Final    Anion Gap 06/16/2025 14  10 - 20 mmol/L Final    Urea Nitrogen 06/16/2025 14  6 - 23  mg/dL Final    Creatinine 06/16/2025 1.05  0.50 - 1.05 mg/dL Final    eGFR 06/16/2025 63  >60 mL/min/1.73m*2 Final    Calcium 06/16/2025 9.8  8.6 - 10.3 mg/dL Final    Albumin 06/16/2025 4.3  3.4 - 5.0 g/dL Final    Alkaline Phosphatase 06/16/2025 37  33 - 110 U/L Final    Total Protein 06/16/2025 7.3  6.4 - 8.2 g/dL Final    AST 06/16/2025 18  9 - 39 U/L Final    Bilirubin, Total 06/16/2025 0.6  0.0 - 1.2 mg/dL Final    ALT 06/16/2025 12  7 - 45 U/L Final   Lab on 05/12/2025   Component Date Value Ref Range Status    CA 27.29 05/12/2025 34.0  0.0 - 38.6 U/mL Final    Glucose 05/12/2025 105 (H)  74 - 99 mg/dL Final    Sodium 05/12/2025 141  136 - 145 mmol/L Final    Potassium 05/12/2025 3.7  3.5 - 5.3 mmol/L Final    Chloride 05/12/2025 105  98 - 107 mmol/L Final    Bicarbonate 05/12/2025 26  21 - 32 mmol/L Final    Anion Gap 05/12/2025 14  10 - 20 mmol/L Final    Urea Nitrogen 05/12/2025 11  6 - 23 mg/dL Final    Creatinine 05/12/2025 1.06 (H)  0.50 - 1.05 mg/dL Final    eGFR 05/12/2025 62  >60 mL/min/1.73m*2 Final    Calcium 05/12/2025 9.3  8.6 - 10.3 mg/dL Final    Albumin 05/12/2025 4.1  3.4 - 5.0 g/dL Final    Alkaline Phosphatase 05/12/2025 44  33 - 110 U/L Final    Total Protein 05/12/2025 7.0  6.4 - 8.2 g/dL Final    AST 05/12/2025 19  9 - 39 U/L Final    Bilirubin, Total 05/12/2025 0.6  0.0 - 1.2 mg/dL Final    ALT 05/12/2025 12  7 - 45 U/L Final    WBC 05/12/2025 4.8  4.4 - 11.3 x10*3/uL Final    nRBC 05/12/2025 0.0  0.0 - 0.0 /100 WBCs Final    RBC 05/12/2025 3.72 (L)  4.00 - 5.20 x10*6/uL Final    Hemoglobin 05/12/2025 10.7 (L)  12.0 - 16.0 g/dL Final    Hematocrit 05/12/2025 33.5 (L)  36.0 - 46.0 % Final    MCV 05/12/2025 90  80 - 100 fL Final    MCH 05/12/2025 28.8  26.0 - 34.0 pg Final    MCHC 05/12/2025 31.9 (L)  32.0 - 36.0 g/dL Final    RDW 05/12/2025 14.4  11.5 - 14.5 % Final    Platelets 05/12/2025 203  150 - 450 x10*3/uL Final    Neutrophils % 05/12/2025 69.3  40.0 - 80.0 % Final    Immature  Granulocytes %, Automated 05/12/2025 0.2  0.0 - 0.9 % Final    Lymphocytes % 05/12/2025 21.3  13.0 - 44.0 % Final    Monocytes % 05/12/2025 7.2  2.0 - 10.0 % Final    Eosinophils % 05/12/2025 1.4  0.0 - 6.0 % Final    Basophils % 05/12/2025 0.6  0.0 - 2.0 % Final    Neutrophils Absolute 05/12/2025 3.35  1.20 - 7.70 x10*3/uL Final    Immature Granulocytes Absolute, Au* 05/12/2025 0.01  0.00 - 0.70 x10*3/uL Final    Lymphocytes Absolute 05/12/2025 1.03 (L)  1.20 - 4.80 x10*3/uL Final    Monocytes Absolute 05/12/2025 0.35  0.10 - 1.00 x10*3/uL Final    Eosinophils Absolute 05/12/2025 0.07  0.00 - 0.70 x10*3/uL Final    Basophils Absolute 05/12/2025 0.03  0.00 - 0.10 x10*3/uL Final      Lab Results   Component Value Date    LABCA2 34.0 05/12/2025    LABCA2 50.4 (H) 04/10/2025    LABCA2 73.2 (H) 03/10/2025    LABCA2 93.2 (H) 01/06/2025          IMPRESSION/PLAN    Stage 4 ER+/NC+/HER2- breast cancer with evidence of bone mets on CT positive on estradiol PET.  Tolerating anastrozole/goserelin with response clinically, by ca27-29 and by CT scans April 2025.   Q3 month bisphosphonate changed to denosumab. Continue holding ribo due to renal toxicity.        RTC in 6 Weeks with CT scans, labs and Marinelli follow up.       CKD/ THANG  CDK4/6 related, continued follow up with Dr Bryant nephrology. Mild Anemia, will eval today with iron studies, folate, ferritin and b12 but may be associated with mild kidney dysfunction.         At least 40 minutes of direct consultation was spent with the patient today reviewing her cancer care plan, cancer features, educating and answering questions regarding ongoing follow up, greater than 50% in counseling and coordination of care      Thank you for the opportunity to be involved your care.   We discussed the clinical significance of diagnosis, goals of care and treatment plan in detail.   Please do not hesitate to reach out with any questions.        -------------------------------------------------------------------------------------------------------------------------------  Ruby Stewart MSN, APRN, FNP-C  [facility]  Division of Medical Oncology- Breast   Collaborating Physician Dr. Ivan Marinelli   [facility] [place] [address]  Phone: [phone]  Fax: [phone]  Available via Secure Chat    Confidential Peer Review Document-  Privilege  Privileged Pursuant to [place] Revised Code Section 2305.24, .25, .251 & .252

## 2025-06-16 NOTE — PATIENT INSTRUCTIONS
Repeat scan and labs as early as 7/24/25 with Dr Marinelli follow up 7/28.    I have added labs today to evaluate for iron or b12 deficiency     Please continue daily anastrozole. Please continue to drink 2 liters of water per day.    Next ovarian suppression shot 7/7/25, next Xgeva 8/25/25.    Please call [phone] with any questions or concerns prior to your next office visit.

## 2025-07-07 ENCOUNTER — SOCIAL WORK (OUTPATIENT)
Dept: CASE MANAGEMENT | Facility: HOSPITAL | Age: 55
End: 2025-07-07

## 2025-07-07 ENCOUNTER — INFUSION (OUTPATIENT)
Dept: HEMATOLOGY/ONCOLOGY | Facility: HOSPITAL | Age: 55
End: 2025-07-07
Payer: COMMERCIAL

## 2025-07-07 VITALS
RESPIRATION RATE: 18 BRPM | OXYGEN SATURATION: 97 % | DIASTOLIC BLOOD PRESSURE: 90 MMHG | SYSTOLIC BLOOD PRESSURE: 141 MMHG | WEIGHT: 174.6 LBS | HEART RATE: 77 BPM | BODY MASS INDEX: 30.93 KG/M2 | TEMPERATURE: 97.2 F

## 2025-07-07 DIAGNOSIS — Z17.0 MALIGNANT NEOPLASM OF CENTRAL PORTION OF LEFT BREAST IN FEMALE, ESTROGEN RECEPTOR POSITIVE: ICD-10-CM

## 2025-07-07 DIAGNOSIS — C50.112 MALIGNANT NEOPLASM OF CENTRAL PORTION OF LEFT BREAST IN FEMALE, ESTROGEN RECEPTOR POSITIVE: ICD-10-CM

## 2025-07-07 PROCEDURE — 96402 CHEMO HORMON ANTINEOPL SQ/IM: CPT

## 2025-07-07 PROCEDURE — 2500000004 HC RX 250 GENERAL PHARMACY W/ HCPCS (ALT 636 FOR OP/ED): Performed by: INTERNAL MEDICINE

## 2025-07-07 RX ORDER — LIDOCAINE HYDROCHLORIDE 10 MG/ML
2 INJECTION, SOLUTION EPIDURAL; INFILTRATION; INTRACAUDAL; PERINEURAL ONCE
OUTPATIENT
Start: 2025-09-29

## 2025-07-07 RX ORDER — ALBUTEROL SULFATE 0.83 MG/ML
3 SOLUTION RESPIRATORY (INHALATION) AS NEEDED
OUTPATIENT
Start: 2025-09-29

## 2025-07-07 RX ORDER — FAMOTIDINE 10 MG/ML
20 INJECTION, SOLUTION INTRAVENOUS ONCE AS NEEDED
OUTPATIENT
Start: 2025-09-29

## 2025-07-07 RX ORDER — DIPHENHYDRAMINE HYDROCHLORIDE 50 MG/ML
50 INJECTION, SOLUTION INTRAMUSCULAR; INTRAVENOUS AS NEEDED
OUTPATIENT
Start: 2025-09-29

## 2025-07-07 RX ORDER — EPINEPHRINE 0.3 MG/.3ML
0.3 INJECTION SUBCUTANEOUS EVERY 5 MIN PRN
OUTPATIENT
Start: 2025-09-29

## 2025-07-07 RX ORDER — LIDOCAINE HYDROCHLORIDE 10 MG/ML
2 INJECTION, SOLUTION EPIDURAL; INFILTRATION; INTRACAUDAL; PERINEURAL ONCE
Status: COMPLETED | OUTPATIENT
Start: 2025-07-07 | End: 2025-07-07

## 2025-07-07 RX ADMIN — GOSERELIN ACETATE 10.8 MG: 10.8 IMPLANT SUBCUTANEOUS at 10:04

## 2025-07-07 RX ADMIN — LIDOCAINE HYDROCHLORIDE 20 MG: 10 INJECTION, SOLUTION EPIDURAL; INFILTRATION; INTRACAUDAL; PERINEURAL at 10:04

## 2025-07-07 NOTE — PROGRESS NOTES
Assisted patient in completing [place] paperwork for 6/16-8/4 to matrix. A copy was emailed to her and emailed to medical records to upload to her chart.    BOY Mcginnis

## 2025-07-07 NOTE — PROGRESS NOTES
Iva Pollock is a 54 y.o. female who presents in stable condition for   Goserelin subcutaneous injection.     Since her last visit, she has been doing well.  Overall, she states her energy level is stable.  Her appetite and hydration status has been good.  She reports arthralgias and night sweats.  She has no new or worsening symptoms to report at this time.    Patient tolerated right subcutaneous injection well and has been educated with the overall therapy plan. She has no other questions or concerns at this time. AVS & future appointment provided. Patient discharged in stable condition.    Xgeva subcutaneous injection due to be given 8/18/2025  Goserelin subcutaneous injection due to be given 9/29/2025    Reviewed and approved by TERESA CASTILLO on 7/7/25 at 10:54 AM.

## 2025-07-10 DIAGNOSIS — I10 ESSENTIAL HYPERTENSION: Primary | ICD-10-CM

## 2025-07-10 RX ORDER — AMLODIPINE BESYLATE 5 MG/1
5 TABLET ORAL DAILY
Qty: 90 TABLET | Refills: 3 | Status: SHIPPED | OUTPATIENT
Start: 2025-07-10 | End: 2026-07-10

## 2025-07-12 DIAGNOSIS — I10 PRIMARY HYPERTENSION: ICD-10-CM

## 2025-07-14 RX ORDER — METOPROLOL SUCCINATE 50 MG/1
50 TABLET, EXTENDED RELEASE ORAL DAILY
Qty: 90 TABLET | Refills: 1 | Status: SHIPPED | OUTPATIENT
Start: 2025-07-14 | End: 2025-07-17 | Stop reason: DRUGHIGH

## 2025-07-17 DIAGNOSIS — I10 ESSENTIAL HYPERTENSION: Primary | ICD-10-CM

## 2025-07-17 RX ORDER — METOPROLOL SUCCINATE 25 MG/1
25 TABLET, EXTENDED RELEASE ORAL DAILY
Qty: 30 TABLET | Refills: 11 | Status: SHIPPED | OUTPATIENT
Start: 2025-07-17 | End: 2026-07-17

## 2025-07-22 ENCOUNTER — TELEPHONE (OUTPATIENT)
Dept: HEMATOLOGY/ONCOLOGY | Facility: HOSPITAL | Age: 55
End: 2025-07-22
Payer: COMMERCIAL

## 2025-07-22 NOTE — TELEPHONE ENCOUNTER
On 7/21 Iva cancelled her CT scan. Called and left message asking why the scan was cancelled and to see if we could get her rescheduled. Contact information left on VM.

## 2025-07-22 NOTE — TELEPHONE ENCOUNTER
Patient returned call. States she had to cancel CT to go out of town for a  and has already rescheduled for this Friday.

## 2025-07-24 ENCOUNTER — APPOINTMENT (OUTPATIENT)
Dept: RADIOLOGY | Facility: HOSPITAL | Age: 55
End: 2025-07-24
Payer: COMMERCIAL

## 2025-07-25 ENCOUNTER — HOSPITAL ENCOUNTER (OUTPATIENT)
Dept: RADIOLOGY | Facility: HOSPITAL | Age: 55
Discharge: HOME | End: 2025-07-25
Payer: COMMERCIAL

## 2025-07-25 DIAGNOSIS — Z51.81 ENCOUNTER FOR MONITORING AROMATASE INHIBITOR THERAPY: ICD-10-CM

## 2025-07-25 DIAGNOSIS — C50.112 MALIGNANT NEOPLASM OF CENTRAL PORTION OF LEFT BREAST IN FEMALE, ESTROGEN RECEPTOR POSITIVE: ICD-10-CM

## 2025-07-25 DIAGNOSIS — E28.39 SUPPRESSION OF OVARIAN SECRETION: ICD-10-CM

## 2025-07-25 DIAGNOSIS — Z79.811 ENCOUNTER FOR MONITORING AROMATASE INHIBITOR THERAPY: ICD-10-CM

## 2025-07-25 DIAGNOSIS — D64.9 ANEMIA, UNSPECIFIED TYPE: ICD-10-CM

## 2025-07-25 DIAGNOSIS — Z51.81 ENCOUNTER FOR MONITORING BISPHOSPHONATE THERAPY: ICD-10-CM

## 2025-07-25 DIAGNOSIS — Z79.83 ENCOUNTER FOR MONITORING BISPHOSPHONATE THERAPY: ICD-10-CM

## 2025-07-25 DIAGNOSIS — Z17.0 MALIGNANT NEOPLASM OF CENTRAL PORTION OF LEFT BREAST IN FEMALE, ESTROGEN RECEPTOR POSITIVE: ICD-10-CM

## 2025-07-25 PROCEDURE — 71260 CT THORAX DX C+: CPT

## 2025-07-25 PROCEDURE — 2550000001 HC RX 255 CONTRASTS: Performed by: NURSE PRACTITIONER

## 2025-07-25 RX ADMIN — IOHEXOL 75 ML: 350 INJECTION, SOLUTION INTRAVENOUS at 09:31

## 2025-07-28 ENCOUNTER — OFFICE VISIT (OUTPATIENT)
Dept: HEMATOLOGY/ONCOLOGY | Facility: HOSPITAL | Age: 55
End: 2025-07-28
Payer: COMMERCIAL

## 2025-07-28 ENCOUNTER — LAB (OUTPATIENT)
Dept: HEMATOLOGY/ONCOLOGY | Facility: HOSPITAL | Age: 55
End: 2025-07-28
Payer: COMMERCIAL

## 2025-07-28 ENCOUNTER — SOCIAL WORK (OUTPATIENT)
Dept: CASE MANAGEMENT | Facility: HOSPITAL | Age: 55
End: 2025-07-28

## 2025-07-28 VITALS
WEIGHT: 174.38 LBS | HEIGHT: 63 IN | SYSTOLIC BLOOD PRESSURE: 151 MMHG | DIASTOLIC BLOOD PRESSURE: 93 MMHG | HEART RATE: 69 BPM | OXYGEN SATURATION: 98 % | TEMPERATURE: 98.6 F | RESPIRATION RATE: 18 BRPM | BODY MASS INDEX: 30.9 KG/M2

## 2025-07-28 DIAGNOSIS — C50.112 MALIGNANT NEOPLASM OF CENTRAL PORTION OF LEFT BREAST IN FEMALE, ESTROGEN RECEPTOR POSITIVE: ICD-10-CM

## 2025-07-28 DIAGNOSIS — Z17.0 MALIGNANT NEOPLASM OF CENTRAL PORTION OF LEFT BREAST IN FEMALE, ESTROGEN RECEPTOR POSITIVE: ICD-10-CM

## 2025-07-28 DIAGNOSIS — Z51.81 ENCOUNTER FOR MONITORING BISPHOSPHONATE THERAPY: ICD-10-CM

## 2025-07-28 DIAGNOSIS — Z79.811 ENCOUNTER FOR MONITORING AROMATASE INHIBITOR THERAPY: ICD-10-CM

## 2025-07-28 DIAGNOSIS — D64.9 ANEMIA, UNSPECIFIED TYPE: ICD-10-CM

## 2025-07-28 DIAGNOSIS — Z79.83 ENCOUNTER FOR MONITORING BISPHOSPHONATE THERAPY: ICD-10-CM

## 2025-07-28 DIAGNOSIS — Z51.81 ENCOUNTER FOR MONITORING AROMATASE INHIBITOR THERAPY: ICD-10-CM

## 2025-07-28 DIAGNOSIS — E28.39 SUPPRESSION OF OVARIAN SECRETION: ICD-10-CM

## 2025-07-28 LAB
ALBUMIN SERPL BCP-MCNC: 3.9 G/DL (ref 3.4–5)
ALP SERPL-CCNC: 44 U/L (ref 33–110)
ALT SERPL W P-5'-P-CCNC: 11 U/L (ref 7–45)
ANION GAP SERPL CALC-SCNC: 14 MMOL/L (ref 10–20)
AST SERPL W P-5'-P-CCNC: 18 U/L (ref 9–39)
BASOPHILS # BLD AUTO: 0.02 X10*3/UL (ref 0–0.1)
BASOPHILS NFR BLD AUTO: 0.6 %
BILIRUB SERPL-MCNC: 0.4 MG/DL (ref 0–1.2)
BUN SERPL-MCNC: 10 MG/DL (ref 6–23)
CALCIUM SERPL-MCNC: 9.2 MG/DL (ref 8.6–10.3)
CANCER AG27-29 SERPL-ACNC: 29.2 U/ML (ref 0–38.6)
CHLORIDE SERPL-SCNC: 106 MMOL/L (ref 98–107)
CO2 SERPL-SCNC: 26 MMOL/L (ref 21–32)
CREAT SERPL-MCNC: 1.17 MG/DL (ref 0.5–1.05)
EGFRCR SERPLBLD CKD-EPI 2021: 55 ML/MIN/1.73M*2
EOSINOPHIL # BLD AUTO: 0.04 X10*3/UL (ref 0–0.7)
EOSINOPHIL NFR BLD AUTO: 1.1 %
ERYTHROCYTE [DISTWIDTH] IN BLOOD BY AUTOMATED COUNT: 12.8 % (ref 11.5–14.5)
GLUCOSE SERPL-MCNC: 99 MG/DL (ref 74–99)
HCT VFR BLD AUTO: 34.5 % (ref 36–46)
HGB BLD-MCNC: 11.3 G/DL (ref 12–16)
IMM GRANULOCYTES # BLD AUTO: 0.01 X10*3/UL (ref 0–0.7)
IMM GRANULOCYTES NFR BLD AUTO: 0.3 % (ref 0–0.9)
LYMPHOCYTES # BLD AUTO: 0.7 X10*3/UL (ref 1.2–4.8)
LYMPHOCYTES NFR BLD AUTO: 19.6 %
MCH RBC QN AUTO: 28.8 PG (ref 26–34)
MCHC RBC AUTO-ENTMCNC: 32.8 G/DL (ref 32–36)
MCV RBC AUTO: 88 FL (ref 80–100)
MONOCYTES # BLD AUTO: 0.29 X10*3/UL (ref 0.1–1)
MONOCYTES NFR BLD AUTO: 8.1 %
NEUTROPHILS # BLD AUTO: 2.52 X10*3/UL (ref 1.2–7.7)
NEUTROPHILS NFR BLD AUTO: 70.3 %
NRBC BLD-RTO: 0 /100 WBCS (ref 0–0)
PLATELET # BLD AUTO: 182 X10*3/UL (ref 150–450)
POTASSIUM SERPL-SCNC: 3.7 MMOL/L (ref 3.5–5.3)
PROT SERPL-MCNC: 6.9 G/DL (ref 6.4–8.2)
RBC # BLD AUTO: 3.92 X10*6/UL (ref 4–5.2)
SODIUM SERPL-SCNC: 142 MMOL/L (ref 136–145)
WBC # BLD AUTO: 3.6 X10*3/UL (ref 4.4–11.3)

## 2025-07-28 PROCEDURE — 86300 IMMUNOASSAY TUMOR CA 15-3: CPT

## 2025-07-28 PROCEDURE — 4010F ACE/ARB THERAPY RXD/TAKEN: CPT | Performed by: INTERNAL MEDICINE

## 2025-07-28 PROCEDURE — 3077F SYST BP >= 140 MM HG: CPT | Performed by: INTERNAL MEDICINE

## 2025-07-28 PROCEDURE — 2500000004 HC RX 250 GENERAL PHARMACY W/ HCPCS (ALT 636 FOR OP/ED): Performed by: INTERNAL MEDICINE

## 2025-07-28 PROCEDURE — 84075 ASSAY ALKALINE PHOSPHATASE: CPT

## 2025-07-28 PROCEDURE — 3080F DIAST BP >= 90 MM HG: CPT | Performed by: INTERNAL MEDICINE

## 2025-07-28 PROCEDURE — 36591 DRAW BLOOD OFF VENOUS DEVICE: CPT

## 2025-07-28 PROCEDURE — 99215 OFFICE O/P EST HI 40 MIN: CPT | Performed by: INTERNAL MEDICINE

## 2025-07-28 PROCEDURE — 99215 OFFICE O/P EST HI 40 MIN: CPT | Mod: GC | Performed by: INTERNAL MEDICINE

## 2025-07-28 PROCEDURE — 85025 COMPLETE CBC W/AUTO DIFF WBC: CPT

## 2025-07-28 PROCEDURE — 3008F BODY MASS INDEX DOCD: CPT | Performed by: INTERNAL MEDICINE

## 2025-07-28 RX ORDER — HEPARIN SODIUM,PORCINE/PF 10 UNIT/ML
50 SYRINGE (ML) INTRAVENOUS AS NEEDED
OUTPATIENT
Start: 2025-07-28

## 2025-07-28 RX ORDER — HEPARIN 100 UNIT/ML
500 SYRINGE INTRAVENOUS AS NEEDED
OUTPATIENT
Start: 2025-07-28

## 2025-07-28 RX ORDER — HEPARIN 100 UNIT/ML
500 SYRINGE INTRAVENOUS AS NEEDED
Status: DISCONTINUED | OUTPATIENT
Start: 2025-07-28 | End: 2025-07-28 | Stop reason: HOSPADM

## 2025-07-28 RX ADMIN — HEPARIN 500 UNITS: 100 SYRINGE at 09:56

## 2025-07-28 ASSESSMENT — ENCOUNTER SYMPTOMS
SEIZURES: 0
NUMBNESS: 0
DYSURIA: 0
DIARRHEA: 0
EYE PROBLEMS: 0
COUGH: 0
HOT FLASHES: 1
SCLERAL ICTERUS: 0
CONSTIPATION: 0
ARTHRALGIAS: 1
HEMATURIA: 0
FREQUENCY: 0
CARDIOVASCULAR NEGATIVE: 1
CHILLS: 0
EXTREMITY WEAKNESS: 0
CONFUSION: 0
BRUISES/BLEEDS EASILY: 0
FEVER: 0
FATIGUE: 0
DIAPHORESIS: 0
SLEEP DISTURBANCE: 0
DEPRESSION: 0
ADENOPATHY: 0
ABDOMINAL PAIN: 0
RESPIRATORY NEGATIVE: 1
NAUSEA: 0
MYALGIAS: 0
PALPITATIONS: 0
CONSTITUTIONAL NEGATIVE: 1
BACK PAIN: 0
NERVOUS/ANXIOUS: 0
BLOOD IN STOOL: 0
HEMOPTYSIS: 0
SHORTNESS OF BREATH: 0
EYES NEGATIVE: 1
ABDOMINAL DISTENTION: 0
DIFFICULTY URINATING: 0
CHEST TIGHTNESS: 0
APPETITE CHANGE: 0
HEADACHES: 0
DIZZINESS: 0
LEG SWELLING: 0
WHEEZING: 0

## 2025-07-28 ASSESSMENT — PAIN SCALES - GENERAL: PAINLEVEL_OUTOF10: 4

## 2025-07-28 NOTE — PATIENT INSTRUCTIONS
Bloodwork in 6 wks  Follow-up in 3 months  Breast ultrasound 1-2 wks prior  Pet/CT 1-2 wks prior   Continue anastrozole and goserelin   Please call us at [phone] option 5 with general questions or option 6 if you are sick or have symptoms.

## 2025-07-28 NOTE — PROGRESS NOTES
Return to work document has been complted and faxed to [employer] @ [phone]. Emailed to patient and uploaded to her medical record.    BOY Mcleod

## 2025-08-18 ENCOUNTER — APPOINTMENT (OUTPATIENT)
Dept: NEPHROLOGY | Facility: CLINIC | Age: 55
End: 2025-08-18
Payer: COMMERCIAL

## 2025-08-18 ENCOUNTER — INFUSION (OUTPATIENT)
Dept: HEMATOLOGY/ONCOLOGY | Facility: HOSPITAL | Age: 55
End: 2025-08-18
Payer: COMMERCIAL

## 2025-08-18 VITALS
WEIGHT: 173.3 LBS | BODY MASS INDEX: 30.71 KG/M2 | HEART RATE: 77 BPM | OXYGEN SATURATION: 100 % | SYSTOLIC BLOOD PRESSURE: 141 MMHG | TEMPERATURE: 98.2 F | DIASTOLIC BLOOD PRESSURE: 88 MMHG

## 2025-08-18 DIAGNOSIS — D64.9 ANEMIA, UNSPECIFIED TYPE: ICD-10-CM

## 2025-08-18 DIAGNOSIS — Z79.811 ENCOUNTER FOR MONITORING AROMATASE INHIBITOR THERAPY: ICD-10-CM

## 2025-08-18 DIAGNOSIS — Z51.81 ENCOUNTER FOR MONITORING BISPHOSPHONATE THERAPY: ICD-10-CM

## 2025-08-18 DIAGNOSIS — C50.112 MALIGNANT NEOPLASM OF CENTRAL PORTION OF LEFT BREAST IN FEMALE, ESTROGEN RECEPTOR POSITIVE: ICD-10-CM

## 2025-08-18 DIAGNOSIS — E28.39 SUPPRESSION OF OVARIAN SECRETION: ICD-10-CM

## 2025-08-18 DIAGNOSIS — Z17.0 MALIGNANT NEOPLASM OF CENTRAL PORTION OF LEFT BREAST IN FEMALE, ESTROGEN RECEPTOR POSITIVE: ICD-10-CM

## 2025-08-18 DIAGNOSIS — C79.51 MALIGNANT NEOPLASM METASTATIC TO BONE (MULTI): ICD-10-CM

## 2025-08-18 DIAGNOSIS — Z51.81 ENCOUNTER FOR MONITORING AROMATASE INHIBITOR THERAPY: ICD-10-CM

## 2025-08-18 DIAGNOSIS — Z79.83 ENCOUNTER FOR MONITORING BISPHOSPHONATE THERAPY: ICD-10-CM

## 2025-08-18 LAB
ALBUMIN SERPL BCP-MCNC: 4.2 G/DL (ref 3.4–5)
ALP SERPL-CCNC: 44 U/L (ref 33–110)
ALT SERPL W P-5'-P-CCNC: 9 U/L (ref 7–45)
ANION GAP SERPL CALC-SCNC: 13 MMOL/L (ref 10–20)
AST SERPL W P-5'-P-CCNC: 19 U/L (ref 9–39)
BASOPHILS # BLD AUTO: 0.03 X10*3/UL (ref 0–0.1)
BASOPHILS NFR BLD AUTO: 0.5 %
BILIRUB SERPL-MCNC: 0.5 MG/DL (ref 0–1.2)
BUN SERPL-MCNC: 12 MG/DL (ref 6–23)
CALCIUM SERPL-MCNC: 9.6 MG/DL (ref 8.6–10.3)
CHLORIDE SERPL-SCNC: 106 MMOL/L (ref 98–107)
CO2 SERPL-SCNC: 26 MMOL/L (ref 21–32)
CREAT SERPL-MCNC: 1.09 MG/DL (ref 0.5–1.05)
EGFRCR SERPLBLD CKD-EPI 2021: 60 ML/MIN/1.73M*2
EOSINOPHIL # BLD AUTO: 0.1 X10*3/UL (ref 0–0.7)
EOSINOPHIL NFR BLD AUTO: 1.7 %
ERYTHROCYTE [DISTWIDTH] IN BLOOD BY AUTOMATED COUNT: 12.5 % (ref 11.5–14.5)
GLUCOSE SERPL-MCNC: 107 MG/DL (ref 74–99)
HCT VFR BLD AUTO: 33.7 % (ref 36–46)
HGB BLD-MCNC: 11.2 G/DL (ref 12–16)
IMM GRANULOCYTES # BLD AUTO: 0.02 X10*3/UL (ref 0–0.7)
IMM GRANULOCYTES NFR BLD AUTO: 0.3 % (ref 0–0.9)
LYMPHOCYTES # BLD AUTO: 1.15 X10*3/UL (ref 1.2–4.8)
LYMPHOCYTES NFR BLD AUTO: 20 %
MCH RBC QN AUTO: 28.9 PG (ref 26–34)
MCHC RBC AUTO-ENTMCNC: 33.2 G/DL (ref 32–36)
MCV RBC AUTO: 87 FL (ref 80–100)
MONOCYTES # BLD AUTO: 0.39 X10*3/UL (ref 0.1–1)
MONOCYTES NFR BLD AUTO: 6.8 %
NEUTROPHILS # BLD AUTO: 4.05 X10*3/UL (ref 1.2–7.7)
NEUTROPHILS NFR BLD AUTO: 70.7 %
NRBC BLD-RTO: 0 /100 WBCS (ref 0–0)
PLATELET # BLD AUTO: 190 X10*3/UL (ref 150–450)
POTASSIUM SERPL-SCNC: 3.8 MMOL/L (ref 3.5–5.3)
PROT SERPL-MCNC: 7.3 G/DL (ref 6.4–8.2)
RBC # BLD AUTO: 3.88 X10*6/UL (ref 4–5.2)
SODIUM SERPL-SCNC: 141 MMOL/L (ref 136–145)
WBC # BLD AUTO: 5.7 X10*3/UL (ref 4.4–11.3)

## 2025-08-18 PROCEDURE — 96372 THER/PROPH/DIAG INJ SC/IM: CPT

## 2025-08-18 PROCEDURE — 85025 COMPLETE CBC W/AUTO DIFF WBC: CPT

## 2025-08-18 PROCEDURE — 2500000004 HC RX 250 GENERAL PHARMACY W/ HCPCS (ALT 636 FOR OP/ED): Performed by: INTERNAL MEDICINE

## 2025-08-18 PROCEDURE — 2500000004 HC RX 250 GENERAL PHARMACY W/ HCPCS (ALT 636 FOR OP/ED): Mod: JZ | Performed by: NURSE PRACTITIONER

## 2025-08-18 PROCEDURE — 96523 IRRIG DRUG DELIVERY DEVICE: CPT

## 2025-08-18 PROCEDURE — 80053 COMPREHEN METABOLIC PANEL: CPT

## 2025-08-18 RX ORDER — ALBUTEROL SULFATE 0.83 MG/ML
3 SOLUTION RESPIRATORY (INHALATION) AS NEEDED
OUTPATIENT
Start: 2025-11-10

## 2025-08-18 RX ORDER — DIPHENHYDRAMINE HYDROCHLORIDE 50 MG/ML
50 INJECTION, SOLUTION INTRAMUSCULAR; INTRAVENOUS AS NEEDED
OUTPATIENT
Start: 2025-11-10

## 2025-08-18 RX ORDER — EPINEPHRINE 0.3 MG/.3ML
0.3 INJECTION SUBCUTANEOUS EVERY 5 MIN PRN
OUTPATIENT
Start: 2025-11-10

## 2025-08-18 RX ORDER — HEPARIN 100 UNIT/ML
500 SYRINGE INTRAVENOUS AS NEEDED
OUTPATIENT
Start: 2025-08-18

## 2025-08-18 RX ORDER — FAMOTIDINE 10 MG/ML
20 INJECTION, SOLUTION INTRAVENOUS ONCE AS NEEDED
OUTPATIENT
Start: 2025-11-10

## 2025-08-18 RX ORDER — HEPARIN 100 UNIT/ML
500 SYRINGE INTRAVENOUS AS NEEDED
Status: DISCONTINUED | OUTPATIENT
Start: 2025-08-18 | End: 2025-08-18 | Stop reason: HOSPADM

## 2025-08-18 RX ORDER — HEPARIN SODIUM,PORCINE/PF 10 UNIT/ML
50 SYRINGE (ML) INTRAVENOUS AS NEEDED
OUTPATIENT
Start: 2025-08-18

## 2025-08-18 RX ADMIN — HEPARIN 500 UNITS: 100 SYRINGE at 09:40

## 2025-08-18 RX ADMIN — DENOSUMAB 120 MG: 120 INJECTION SUBCUTANEOUS at 09:38

## 2025-08-25 ENCOUNTER — TELEMEDICINE (OUTPATIENT)
Dept: PRIMARY CARE | Facility: CLINIC | Age: 55
End: 2025-08-25
Payer: COMMERCIAL

## 2025-08-25 ENCOUNTER — APPOINTMENT (OUTPATIENT)
Dept: HEMATOLOGY/ONCOLOGY | Facility: HOSPITAL | Age: 55
End: 2025-08-25
Payer: COMMERCIAL

## 2025-08-25 DIAGNOSIS — Z53.21 PROCEDURE AND TREATMENT NOT CARRIED OUT DUE TO PATIENT LEAVING PRIOR TO BEING SEEN BY HEALTH CARE PROVIDER: Primary | ICD-10-CM

## 2025-08-25 PROCEDURE — 4010F ACE/ARB THERAPY RXD/TAKEN: CPT | Performed by: PHYSICIAN ASSISTANT

## 2025-08-28 DIAGNOSIS — E11.65 TYPE 2 DIABETES MELLITUS WITH HYPERGLYCEMIA, WITHOUT LONG-TERM CURRENT USE OF INSULIN: ICD-10-CM

## 2025-08-28 RX ORDER — METFORMIN HYDROCHLORIDE 500 MG/1
TABLET, EXTENDED RELEASE ORAL
Qty: 120 TABLET | Refills: 6 | Status: SHIPPED | OUTPATIENT
Start: 2025-08-28

## 2025-08-29 ENCOUNTER — OFFICE VISIT (OUTPATIENT)
Dept: URGENT CARE | Age: 55
End: 2025-08-29
Payer: COMMERCIAL

## 2025-08-29 VITALS
DIASTOLIC BLOOD PRESSURE: 79 MMHG | OXYGEN SATURATION: 98 % | RESPIRATION RATE: 20 BRPM | HEART RATE: 56 BPM | BODY MASS INDEX: 30.71 KG/M2 | HEIGHT: 63 IN | TEMPERATURE: 98 F | WEIGHT: 173.3 LBS | SYSTOLIC BLOOD PRESSURE: 127 MMHG

## 2025-08-29 DIAGNOSIS — M25.531 RIGHT WRIST PAIN: Primary | ICD-10-CM

## 2025-08-29 RX ORDER — COLCHICINE 0.6 MG/1
0.6 TABLET ORAL EVERY 8 HOURS PRN
Qty: 30 TABLET | Refills: 0 | Status: SHIPPED | OUTPATIENT
Start: 2025-08-29 | End: 2026-08-29

## 2025-10-20 ENCOUNTER — APPOINTMENT (OUTPATIENT)
Dept: NEPHROLOGY | Facility: CLINIC | Age: 55
End: 2025-10-20
Payer: COMMERCIAL

## 2025-12-16 ENCOUNTER — APPOINTMENT (OUTPATIENT)
Dept: PRIMARY CARE | Facility: CLINIC | Age: 55
End: 2025-12-16
Payer: COMMERCIAL